# Patient Record
Sex: MALE | Employment: FULL TIME | URBAN - METROPOLITAN AREA
[De-identification: names, ages, dates, MRNs, and addresses within clinical notes are randomized per-mention and may not be internally consistent; named-entity substitution may affect disease eponyms.]

---

## 2019-09-18 ENCOUNTER — APPOINTMENT (OUTPATIENT)
Dept: GENERAL RADIOLOGY | Age: 39
DRG: 907 | End: 2019-09-18
Attending: EMERGENCY MEDICINE
Payer: COMMERCIAL

## 2019-09-18 ENCOUNTER — HOSPITAL ENCOUNTER (EMERGENCY)
Age: 39
Discharge: HOME | DRG: 907 | End: 2019-09-18
Attending: EMERGENCY MEDICINE
Payer: COMMERCIAL

## 2019-09-18 ENCOUNTER — APPOINTMENT (OUTPATIENT)
Dept: ULTRASOUND IMAGING | Age: 39
DRG: 907 | End: 2019-09-18
Attending: EMERGENCY MEDICINE
Payer: COMMERCIAL

## 2019-09-18 VITALS
BODY MASS INDEX: 30.53 KG/M2 | TEMPERATURE: 98.8 F | DIASTOLIC BLOOD PRESSURE: 77 MMHG | SYSTOLIC BLOOD PRESSURE: 135 MMHG | WEIGHT: 190 LBS | OXYGEN SATURATION: 97 % | HEIGHT: 66 IN | RESPIRATION RATE: 14 BRPM | HEART RATE: 81 BPM

## 2019-09-18 DIAGNOSIS — R10.13 ABDOMINAL PAIN, EPIGASTRIC: Primary | ICD-10-CM

## 2019-09-18 LAB
ALBUMIN SERPL-MCNC: 4 G/DL (ref 3.5–5)
ALBUMIN/GLOB SERPL: 1.1 {RATIO} (ref 1.1–2.2)
ALP SERPL-CCNC: 80 U/L (ref 45–117)
ALT SERPL-CCNC: 103 U/L (ref 12–78)
ANION GAP SERPL CALC-SCNC: 5 MMOL/L (ref 5–15)
AST SERPL-CCNC: 41 U/L (ref 15–37)
BASOPHILS # BLD: 0 K/UL (ref 0–0.1)
BASOPHILS NFR BLD: 0 % (ref 0–1)
BILIRUB SERPL-MCNC: 0.4 MG/DL (ref 0.2–1)
BUN SERPL-MCNC: 17 MG/DL (ref 6–20)
BUN/CREAT SERPL: 17 (ref 12–20)
CALCIUM SERPL-MCNC: 9.1 MG/DL (ref 8.5–10.1)
CHLORIDE SERPL-SCNC: 106 MMOL/L (ref 97–108)
CO2 SERPL-SCNC: 27 MMOL/L (ref 21–32)
CREAT SERPL-MCNC: 1.03 MG/DL (ref 0.7–1.3)
DIFFERENTIAL METHOD BLD: ABNORMAL
EOSINOPHIL # BLD: 0 K/UL (ref 0–0.4)
EOSINOPHIL NFR BLD: 0 % (ref 0–7)
ERYTHROCYTE [DISTWIDTH] IN BLOOD BY AUTOMATED COUNT: 12.5 % (ref 11.5–14.5)
GLOBULIN SER CALC-MCNC: 3.8 G/DL (ref 2–4)
GLUCOSE SERPL-MCNC: 139 MG/DL (ref 65–100)
HCT VFR BLD AUTO: 41 % (ref 36.6–50.3)
HGB BLD-MCNC: 14.3 G/DL (ref 12.1–17)
IMM GRANULOCYTES # BLD AUTO: 0.1 K/UL (ref 0–0.04)
IMM GRANULOCYTES NFR BLD AUTO: 1 % (ref 0–0.5)
LYMPHOCYTES # BLD: 1.2 K/UL (ref 0.8–3.5)
LYMPHOCYTES NFR BLD: 11 % (ref 12–49)
MCH RBC QN AUTO: 32.2 PG (ref 26–34)
MCHC RBC AUTO-ENTMCNC: 34.9 G/DL (ref 30–36.5)
MCV RBC AUTO: 92.3 FL (ref 80–99)
MONOCYTES # BLD: 0.6 K/UL (ref 0–1)
MONOCYTES NFR BLD: 5 % (ref 5–13)
NEUTS SEG # BLD: 9.1 K/UL (ref 1.8–8)
NEUTS SEG NFR BLD: 83 % (ref 32–75)
NRBC # BLD: 0 K/UL (ref 0–0.01)
NRBC BLD-RTO: 0 PER 100 WBC
PLATELET # BLD AUTO: 227 K/UL (ref 150–400)
PMV BLD AUTO: 9.3 FL (ref 8.9–12.9)
POTASSIUM SERPL-SCNC: 3.6 MMOL/L (ref 3.5–5.1)
PROT SERPL-MCNC: 7.8 G/DL (ref 6.4–8.2)
RBC # BLD AUTO: 4.44 M/UL (ref 4.1–5.7)
SODIUM SERPL-SCNC: 138 MMOL/L (ref 136–145)
WBC # BLD AUTO: 10.9 K/UL (ref 4.1–11.1)

## 2019-09-18 PROCEDURE — 74011250636 HC RX REV CODE- 250/636

## 2019-09-18 PROCEDURE — 36415 COLL VENOUS BLD VENIPUNCTURE: CPT

## 2019-09-18 PROCEDURE — 71045 X-RAY EXAM CHEST 1 VIEW: CPT

## 2019-09-18 PROCEDURE — 76705 ECHO EXAM OF ABDOMEN: CPT

## 2019-09-18 PROCEDURE — 74011250637 HC RX REV CODE- 250/637: Performed by: EMERGENCY MEDICINE

## 2019-09-18 PROCEDURE — 85025 COMPLETE CBC W/AUTO DIFF WBC: CPT

## 2019-09-18 PROCEDURE — 74011250636 HC RX REV CODE- 250/636: Performed by: EMERGENCY MEDICINE

## 2019-09-18 PROCEDURE — 99283 EMERGENCY DEPT VISIT LOW MDM: CPT

## 2019-09-18 PROCEDURE — 80053 COMPREHEN METABOLIC PANEL: CPT

## 2019-09-18 RX ORDER — OXYCODONE AND ACETAMINOPHEN 5; 325 MG/1; MG/1
1 TABLET ORAL
Qty: 9 TAB | Refills: 0 | Status: SHIPPED | OUTPATIENT
Start: 2019-09-18 | End: 2019-09-26

## 2019-09-18 RX ORDER — ONDANSETRON 4 MG/1
8 TABLET, ORALLY DISINTEGRATING ORAL
Status: COMPLETED | OUTPATIENT
Start: 2019-09-18 | End: 2019-09-18

## 2019-09-18 RX ORDER — LOSARTAN POTASSIUM 50 MG/1
50 TABLET ORAL DAILY
COMMUNITY
End: 2019-09-26

## 2019-09-18 RX ORDER — ONDANSETRON 8 MG/1
8 TABLET, ORALLY DISINTEGRATING ORAL
Qty: 15 TAB | Refills: 0 | Status: SHIPPED | OUTPATIENT
Start: 2019-09-18 | End: 2019-09-26

## 2019-09-18 RX ORDER — ACETAMINOPHEN 500 MG
500 TABLET ORAL
COMMUNITY

## 2019-09-18 RX ORDER — OXYCODONE AND ACETAMINOPHEN 5; 325 MG/1; MG/1
1 TABLET ORAL ONCE
Status: COMPLETED | OUTPATIENT
Start: 2019-09-18 | End: 2019-09-18

## 2019-09-18 RX ADMIN — ONDANSETRON 8 MG: 4 TABLET, ORALLY DISINTEGRATING ORAL at 21:51

## 2019-09-18 RX ADMIN — OXYCODONE HYDROCHLORIDE AND ACETAMINOPHEN 1 TABLET: 5; 325 TABLET ORAL at 21:51

## 2019-09-18 RX ADMIN — SODIUM CHLORIDE 1000 ML: 900 INJECTION, SOLUTION INTRAVENOUS at 21:50

## 2019-09-18 NOTE — LETTER
[address] 
[facility] EMERGENCY DEPT 
[address] [facility] [place] [address] [phone] Work/School Note Date: 9/18/2019 To Whom It May concern: Fermin Vivas was seen and treated today in the emergency room by the following provider(s): 
Attending Provider: Amy Romero MD. Fermin Vivas may return to work on 9/20/19. Sincerely, Kelsy Pickens MD

## 2019-09-19 NOTE — ED PROVIDER NOTES
Pt presents with 8/10 pain after a liver biopsy by Dr. Brittaney Joseph this morning. Denies fevers, chest pain, shortness of breath. Reports nausea, chills, and lightheadedness - had a small amount of vomiting as well. Symptoms are worse when he stands and improved with rest.  Pain has been gradually worsening. The history is provided by the patient. No past medical history on file. No past surgical history on file. No family history on file. Social History     Socioeconomic History    Marital status: Not on file     Spouse name: Not on file    Number of children: Not on file    Years of education: Not on file    Highest education level: Not on file   Occupational History    Not on file   Social Needs    Financial resource strain: Not on file    Food insecurity:     Worry: Not on file     Inability: Not on file    Transportation needs:     Medical: Not on file     Non-medical: Not on file   Tobacco Use    Smoking status: Not on file   Substance and Sexual Activity    Alcohol use: Not on file    Drug use: Not on file    Sexual activity: Not on file   Lifestyle    Physical activity:     Days per week: Not on file     Minutes per session: Not on file    Stress: Not on file   Relationships    Social connections:     Talks on phone: Not on file     Gets together: Not on file     Attends Sabianist service: Not on file     Active member of club or organization: Not on file     Attends meetings of clubs or organizations: Not on file     Relationship status: Not on file    Intimate partner violence:     Fear of current or ex partner: Not on file     Emotionally abused: Not on file     Physically abused: Not on file     Forced sexual activity: Not on file   Other Topics Concern    Not on file   Social History Narrative    Not on file         ALLERGIES: Patient has no allergy information on record. Review of Systems   Constitutional: Positive for chills. Negative for fever.    Respiratory: Negative for shortness of breath. Cardiovascular: Negative for chest pain. Gastrointestinal: Positive for abdominal pain, nausea and vomiting. Neurological: Positive for dizziness. Vitals:    09/18/19 2044   BP: 135/77   Pulse: 81   Resp: 14   Temp: 98.8 °F (37.1 °C)   SpO2: 97%   Weight: 86.2 kg (190 lb)   Height: 5' 6\" (1.676 m)            Physical Exam   Constitutional: He appears well-developed and well-nourished. No distress. HENT:   Head: Normocephalic and atraumatic. Eyes: Pupils are equal, round, and reactive to light. Conjunctivae are normal.   Neck: Normal range of motion. Cardiovascular: Normal rate and regular rhythm. Pulmonary/Chest: Effort normal and breath sounds normal.   Abdominal: Soft. He exhibits no distension. There is tenderness in the epigastric area. There is no rigidity, no rebound and no guarding. Musculoskeletal: Normal range of motion. Neurological: He is alert. Skin: Skin is dry. Capillary refill takes less than 2 seconds. Nursing note and vitals reviewed. MDM  Number of Diagnoses or Management Options  Abdominal pain, epigastric:   Diagnosis management comments: The patient is resting comfortably and feels better, is alert and in no distress. The repeat examination is unremarkable and benign; in particular, there is no discomfort at McBurney's point. The history, exam, diagnostic testing, and current condition do not suggest acute appendicitis, bowel obstruction, incarcerated hernia, acute cholecystitis, bowel perforation, major gastrointestinal bleeding, severe diverticulitis, sepsis, or other significant pathology to warrant further testing, continued ED treatment, admission, or surgical evaluation at this point. The vital signs have been stable and are within normal limits at this time. The patient does not have uncontrollable pain, intractable vomiting, or other significant symptoms. The patient's condition is stable and appropriate for discharge. The patient will pursue further outpatient evaluation with the primary care physician or other designated or consulting physician as indicated in the discharge instructions.            Procedures

## 2019-09-19 NOTE — ED TRIAGE NOTES
Pt here for increasing pain 8/10 after liver biopsy this a.m Tylenol at 1500. Reports nausea, vomiting, and dizziness.   Pt advised by surgeon to come to ED for eval.

## 2019-09-19 NOTE — PROGRESS NOTES
[facility]: MED RECONCILIATION    Information obtained from: patient, no Rx query on file    Significant PMH/Disease States: No past medical history on file. Chief Complaint for this Admission:   Chief Complaint   Patient presents with    Post OP Complication     Allergies: Patient has no known allergies. Prior to Admission Medications:     Medication Documentation Review Audit       Reviewed by LILI PenalozaD (Pharmacist) on 09/18/19 at 2239      Medication Sig Documenting Provider Last Dose Status Taking?   acetaminophen (TYLENOL) 500 mg tablet Take 500 mg by mouth every six (6) hours as needed for Pain. Provider, Historical 9/18/2019 1500 Active Yes   losartan (COZAAR) 50 mg tablet Take 50 mg by mouth daily. Provider, Historical 9/18/2019 afternoon Active Yes                  Thank you for the consult,  Daryl Sher

## 2019-09-19 NOTE — DISCHARGE INSTRUCTIONS

## 2019-09-19 NOTE — ED NOTES
Patient given discharge instructions per provider, two RX given. Patient verbalized understanding. Ambulatory from ED to home with wife.

## 2019-09-20 ENCOUNTER — APPOINTMENT (OUTPATIENT)
Dept: CT IMAGING | Age: 39
DRG: 907 | End: 2019-09-20
Attending: PHYSICIAN ASSISTANT
Payer: COMMERCIAL

## 2019-09-20 ENCOUNTER — HOSPITAL ENCOUNTER (INPATIENT)
Age: 39
LOS: 6 days | Discharge: HOME | DRG: 907 | End: 2019-09-26
Attending: EMERGENCY MEDICINE | Admitting: SURGERY
Payer: COMMERCIAL

## 2019-09-20 ENCOUNTER — APPOINTMENT (OUTPATIENT)
Dept: INTERVENTIONAL RADIOLOGY/VASCULAR | Age: 39
DRG: 907 | End: 2019-09-20
Attending: PHYSICIAN ASSISTANT
Payer: COMMERCIAL

## 2019-09-20 DIAGNOSIS — K76.89 PERIHEPATIC HEMATOMA: ICD-10-CM

## 2019-09-20 DIAGNOSIS — R00.0 TACHYCARDIA: ICD-10-CM

## 2019-09-20 DIAGNOSIS — R79.89 ELEVATED LFTS: ICD-10-CM

## 2019-09-20 DIAGNOSIS — K66.1 HEMOPERITONEUM: Primary | ICD-10-CM

## 2019-09-20 PROBLEM — I10 HTN (HYPERTENSION): Status: ACTIVE | Noted: 2019-09-20

## 2019-09-20 PROBLEM — E83.119 HEMOCHROMATOSIS: Status: ACTIVE | Noted: 2019-09-20

## 2019-09-20 PROBLEM — K91.840 HEMORRHAGE FOLLOWING LIVER BIOPSY: Status: ACTIVE | Noted: 2019-09-20

## 2019-09-20 LAB
ALBUMIN SERPL-MCNC: 3.6 G/DL (ref 3.5–5)
ALBUMIN/GLOB SERPL: 1 {RATIO} (ref 1.1–2.2)
ALP SERPL-CCNC: 72 U/L (ref 45–117)
ALT SERPL-CCNC: 898 U/L (ref 12–78)
ANION GAP SERPL CALC-SCNC: 6 MMOL/L (ref 5–15)
AST SERPL-CCNC: 560 U/L (ref 15–37)
BASOPHILS # BLD: 0 K/UL (ref 0–0.1)
BASOPHILS NFR BLD: 0 % (ref 0–1)
BILIRUB SERPL-MCNC: 0.7 MG/DL (ref 0.2–1)
BUN SERPL-MCNC: 17 MG/DL (ref 6–20)
BUN/CREAT SERPL: 18 (ref 12–20)
CALCIUM SERPL-MCNC: 8.5 MG/DL (ref 8.5–10.1)
CHLORIDE SERPL-SCNC: 103 MMOL/L (ref 97–108)
CO2 SERPL-SCNC: 28 MMOL/L (ref 21–32)
COMMENT, HOLDF: NORMAL
CREAT SERPL-MCNC: 0.94 MG/DL (ref 0.7–1.3)
DIFFERENTIAL METHOD BLD: ABNORMAL
EOSINOPHIL # BLD: 0 K/UL (ref 0–0.4)
EOSINOPHIL NFR BLD: 0 % (ref 0–7)
ERYTHROCYTE [DISTWIDTH] IN BLOOD BY AUTOMATED COUNT: 12.6 % (ref 11.5–14.5)
GLOBULIN SER CALC-MCNC: 3.5 G/DL (ref 2–4)
GLUCOSE SERPL-MCNC: 131 MG/DL (ref 65–100)
HCT VFR BLD AUTO: 21.8 % (ref 36.6–50.3)
HCT VFR BLD AUTO: 28.5 % (ref 36.6–50.3)
HGB BLD-MCNC: 7.3 G/DL (ref 12.1–17)
HGB BLD-MCNC: 9.8 G/DL (ref 12.1–17)
IMM GRANULOCYTES # BLD AUTO: 0.1 K/UL (ref 0–0.04)
IMM GRANULOCYTES NFR BLD AUTO: 1 % (ref 0–0.5)
INR PPP: 1 (ref 0.9–1.1)
LACTATE BLD-SCNC: 1.54 MMOL/L (ref 0.4–2)
LACTATE BLD-SCNC: 2.02 MMOL/L (ref 0.4–2)
LIPASE SERPL-CCNC: 58 U/L (ref 73–393)
LYMPHOCYTES # BLD: 1.3 K/UL (ref 0.8–3.5)
LYMPHOCYTES NFR BLD: 14 % (ref 12–49)
MCH RBC QN AUTO: 32.2 PG (ref 26–34)
MCHC RBC AUTO-ENTMCNC: 34.4 G/DL (ref 30–36.5)
MCV RBC AUTO: 93.8 FL (ref 80–99)
MONOCYTES # BLD: 0.7 K/UL (ref 0–1)
MONOCYTES NFR BLD: 8 % (ref 5–13)
NEUTS SEG # BLD: 7.3 K/UL (ref 1.8–8)
NEUTS SEG NFR BLD: 78 % (ref 32–75)
NRBC # BLD: 0 K/UL (ref 0–0.01)
NRBC BLD-RTO: 0 PER 100 WBC
PLATELET # BLD AUTO: 179 K/UL (ref 150–400)
PMV BLD AUTO: 9.6 FL (ref 8.9–12.9)
POTASSIUM SERPL-SCNC: 3.7 MMOL/L (ref 3.5–5.1)
PROT SERPL-MCNC: 7.1 G/DL (ref 6.4–8.2)
PROTHROMBIN TIME: 10.3 SEC (ref 9–11.1)
RBC # BLD AUTO: 3.04 M/UL (ref 4.1–5.7)
SAMPLES BEING HELD,HOLD: NORMAL
SODIUM SERPL-SCNC: 137 MMOL/L (ref 136–145)
WBC # BLD AUTO: 9.4 K/UL (ref 4.1–11.1)

## 2019-09-20 PROCEDURE — 83605 ASSAY OF LACTIC ACID: CPT

## 2019-09-20 PROCEDURE — 77030019698 HC SYR ANGI MDLON MRTM -A

## 2019-09-20 PROCEDURE — 36415 COLL VENOUS BLD VENIPUNCTURE: CPT

## 2019-09-20 PROCEDURE — 77030029065 HC DRSG HEMO QCLOT ZMED -B

## 2019-09-20 PROCEDURE — 37244 VASC EMBOLIZE/OCCLUDE BLEED: CPT

## 2019-09-20 PROCEDURE — 04L33DZ OCCLUSION OF HEPATIC ARTERY WITH INTRALUMINAL DEVICE, PERCUTANEOUS APPROACH: ICD-10-PCS | Performed by: RADIOLOGY

## 2019-09-20 PROCEDURE — 74011636320 HC RX REV CODE- 636/320: Performed by: RADIOLOGY

## 2019-09-20 PROCEDURE — 86900 BLOOD TYPING SEROLOGIC ABO: CPT

## 2019-09-20 PROCEDURE — 83690 ASSAY OF LIPASE: CPT

## 2019-09-20 PROCEDURE — 96374 THER/PROPH/DIAG INJ IV PUSH: CPT

## 2019-09-20 PROCEDURE — 65610000006 HC RM INTENSIVE CARE

## 2019-09-20 PROCEDURE — 74177 CT ABD & PELVIS W/CONTRAST: CPT

## 2019-09-20 PROCEDURE — 80053 COMPREHEN METABOLIC PANEL: CPT

## 2019-09-20 PROCEDURE — 85025 COMPLETE CBC W/AUTO DIFF WBC: CPT

## 2019-09-20 PROCEDURE — C1769 GUIDE WIRE: HCPCS

## 2019-09-20 PROCEDURE — 77030004530 HC CATH ANGI DX IMGR BSC -A

## 2019-09-20 PROCEDURE — C1760 CLOSURE DEV, VASC: HCPCS

## 2019-09-20 PROCEDURE — 85610 PROTHROMBIN TIME: CPT

## 2019-09-20 PROCEDURE — 74011250636 HC RX REV CODE- 250/636

## 2019-09-20 PROCEDURE — 99285 EMERGENCY DEPT VISIT HI MDM: CPT

## 2019-09-20 PROCEDURE — 74011250636 HC RX REV CODE- 250/636: Performed by: PHYSICIAN ASSISTANT

## 2019-09-20 PROCEDURE — 74011250636 HC RX REV CODE- 250/636: Performed by: RADIOLOGY

## 2019-09-20 PROCEDURE — 74011000272 HC RX REV CODE- 272: Performed by: RADIOLOGY

## 2019-09-20 PROCEDURE — C1894 INTRO/SHEATH, NON-LASER: HCPCS

## 2019-09-20 PROCEDURE — 86923 COMPATIBILITY TEST ELECTRIC: CPT

## 2019-09-20 PROCEDURE — C1887 CATHETER, GUIDING: HCPCS

## 2019-09-20 PROCEDURE — 99152 MOD SED SAME PHYS/QHP 5/>YRS: CPT

## 2019-09-20 PROCEDURE — 99153 MOD SED SAME PHYS/QHP EA: CPT

## 2019-09-20 PROCEDURE — 85018 HEMOGLOBIN: CPT

## 2019-09-20 PROCEDURE — 74011250636 HC RX REV CODE- 250/636: Performed by: EMERGENCY MEDICINE

## 2019-09-20 PROCEDURE — 74011000250 HC RX REV CODE- 250: Performed by: RADIOLOGY

## 2019-09-20 PROCEDURE — 76937 US GUIDE VASCULAR ACCESS: CPT

## 2019-09-20 PROCEDURE — 96375 TX/PRO/DX INJ NEW DRUG ADDON: CPT

## 2019-09-20 PROCEDURE — 74011250637 HC RX REV CODE- 250/637: Performed by: PHYSICIAN ASSISTANT

## 2019-09-20 RX ORDER — ACETAMINOPHEN 325 MG/1
650 TABLET ORAL
Status: DISCONTINUED | OUTPATIENT
Start: 2019-09-20 | End: 2019-09-23

## 2019-09-20 RX ORDER — ONDANSETRON 2 MG/ML
4 INJECTION INTRAMUSCULAR; INTRAVENOUS
Status: DISCONTINUED | OUTPATIENT
Start: 2019-09-20 | End: 2019-09-26 | Stop reason: HOSPADM

## 2019-09-20 RX ORDER — CEFAZOLIN SODIUM/WATER 2 G/20 ML
2 SYRINGE (ML) INTRAVENOUS ONCE
Status: DISCONTINUED | OUTPATIENT
Start: 2019-09-20 | End: 2019-09-20

## 2019-09-20 RX ORDER — HYDROCODONE BITARTRATE AND ACETAMINOPHEN 10; 325 MG/1; MG/1
1 TABLET ORAL
Status: DISCONTINUED | OUTPATIENT
Start: 2019-09-20 | End: 2019-09-23

## 2019-09-20 RX ORDER — HYDROMORPHONE HYDROCHLORIDE 1 MG/ML
0.5 INJECTION, SOLUTION INTRAMUSCULAR; INTRAVENOUS; SUBCUTANEOUS ONCE
Status: COMPLETED | OUTPATIENT
Start: 2019-09-20 | End: 2019-09-20

## 2019-09-20 RX ORDER — HYDRALAZINE HYDROCHLORIDE 20 MG/ML
20 INJECTION INTRAMUSCULAR; INTRAVENOUS
Status: DISCONTINUED | OUTPATIENT
Start: 2019-09-20 | End: 2019-09-26 | Stop reason: HOSPADM

## 2019-09-20 RX ORDER — LIDOCAINE HYDROCHLORIDE 10 MG/ML
10-30 INJECTION INFILTRATION; PERINEURAL
Status: DISCONTINUED | OUTPATIENT
Start: 2019-09-20 | End: 2019-09-20 | Stop reason: HOSPADM

## 2019-09-20 RX ORDER — NALOXONE HYDROCHLORIDE 0.4 MG/ML
0.4 INJECTION, SOLUTION INTRAMUSCULAR; INTRAVENOUS; SUBCUTANEOUS AS NEEDED
Status: DISCONTINUED | OUTPATIENT
Start: 2019-09-20 | End: 2019-09-26 | Stop reason: HOSPADM

## 2019-09-20 RX ORDER — KETOROLAC TROMETHAMINE 30 MG/ML
30 INJECTION, SOLUTION INTRAMUSCULAR; INTRAVENOUS
Status: COMPLETED | OUTPATIENT
Start: 2019-09-20 | End: 2019-09-20

## 2019-09-20 RX ORDER — FENTANYL CITRATE 50 UG/ML
25-50 INJECTION, SOLUTION INTRAMUSCULAR; INTRAVENOUS
Status: DISCONTINUED | OUTPATIENT
Start: 2019-09-20 | End: 2019-09-20 | Stop reason: HOSPADM

## 2019-09-20 RX ORDER — MIDAZOLAM HYDROCHLORIDE 1 MG/ML
.5-1 INJECTION, SOLUTION INTRAMUSCULAR; INTRAVENOUS
Status: DISCONTINUED | OUTPATIENT
Start: 2019-09-20 | End: 2019-09-20 | Stop reason: HOSPADM

## 2019-09-20 RX ORDER — SODIUM CHLORIDE 9 MG/ML
125 INJECTION, SOLUTION INTRAVENOUS CONTINUOUS
Status: DISCONTINUED | OUTPATIENT
Start: 2019-09-20 | End: 2019-09-22

## 2019-09-20 RX ORDER — DOCUSATE SODIUM 100 MG/1
100 CAPSULE, LIQUID FILLED ORAL 2 TIMES DAILY
Status: DISCONTINUED | OUTPATIENT
Start: 2019-09-20 | End: 2019-09-22

## 2019-09-20 RX ORDER — HYDROCODONE BITARTRATE AND ACETAMINOPHEN 5; 325 MG/1; MG/1
1 TABLET ORAL
Status: DISCONTINUED | OUTPATIENT
Start: 2019-09-20 | End: 2019-09-23

## 2019-09-20 RX ORDER — SODIUM CHLORIDE 0.9 % (FLUSH) 0.9 %
5-40 SYRINGE (ML) INJECTION EVERY 8 HOURS
Status: DISCONTINUED | OUTPATIENT
Start: 2019-09-20 | End: 2019-09-26 | Stop reason: HOSPADM

## 2019-09-20 RX ORDER — MORPHINE SULFATE 4 MG/ML
4 INJECTION INTRAVENOUS ONCE
Status: COMPLETED | OUTPATIENT
Start: 2019-09-20 | End: 2019-09-20

## 2019-09-20 RX ORDER — DIPHENHYDRAMINE HYDROCHLORIDE 50 MG/ML
12.5 INJECTION, SOLUTION INTRAMUSCULAR; INTRAVENOUS
Status: DISCONTINUED | OUTPATIENT
Start: 2019-09-20 | End: 2019-09-26 | Stop reason: HOSPADM

## 2019-09-20 RX ORDER — CEFAZOLIN SODIUM 1 G/3ML
2 INJECTION, POWDER, FOR SOLUTION INTRAMUSCULAR; INTRAVENOUS ONCE
Status: DISCONTINUED | OUTPATIENT
Start: 2019-09-20 | End: 2019-09-20

## 2019-09-20 RX ORDER — HYDROMORPHONE HYDROCHLORIDE 2 MG/ML
0.5 INJECTION, SOLUTION INTRAMUSCULAR; INTRAVENOUS; SUBCUTANEOUS
Status: DISCONTINUED | OUTPATIENT
Start: 2019-09-20 | End: 2019-09-26 | Stop reason: HOSPADM

## 2019-09-20 RX ORDER — SODIUM CHLORIDE 0.9 % (FLUSH) 0.9 %
5-40 SYRINGE (ML) INJECTION AS NEEDED
Status: DISCONTINUED | OUTPATIENT
Start: 2019-09-20 | End: 2019-09-26 | Stop reason: HOSPADM

## 2019-09-20 RX ADMIN — IOPAMIDOL 100 ML: 755 INJECTION, SOLUTION INTRAVENOUS at 12:33

## 2019-09-20 RX ADMIN — LIDOCAINE HYDROCHLORIDE 15 ML: 10 INJECTION, SOLUTION INFILTRATION; PERINEURAL at 16:15

## 2019-09-20 RX ADMIN — FENTANYL CITRATE 25 MCG: 50 INJECTION, SOLUTION INTRAMUSCULAR; INTRAVENOUS at 17:12

## 2019-09-20 RX ADMIN — IOPAMIDOL 100 ML: 755 INJECTION, SOLUTION INTRAVENOUS at 17:20

## 2019-09-20 RX ADMIN — HYDROMORPHONE HYDROCHLORIDE 0.5 MG: 2 INJECTION INTRAMUSCULAR; INTRAVENOUS; SUBCUTANEOUS at 22:19

## 2019-09-20 RX ADMIN — MIDAZOLAM HYDROCHLORIDE 1 MG: 1 INJECTION, SOLUTION INTRAMUSCULAR; INTRAVENOUS at 16:13

## 2019-09-20 RX ADMIN — FENTANYL CITRATE 50 MCG: 50 INJECTION, SOLUTION INTRAMUSCULAR; INTRAVENOUS at 16:04

## 2019-09-20 RX ADMIN — FENTANYL CITRATE 25 MCG: 50 INJECTION, SOLUTION INTRAMUSCULAR; INTRAVENOUS at 16:52

## 2019-09-20 RX ADMIN — CEFAZOLIN 2 G: 1 INJECTION, POWDER, FOR SOLUTION INTRAMUSCULAR; INTRAVENOUS at 16:04

## 2019-09-20 RX ADMIN — Medication 10 ML: at 18:06

## 2019-09-20 RX ADMIN — FENTANYL CITRATE 25 MCG: 50 INJECTION, SOLUTION INTRAMUSCULAR; INTRAVENOUS at 17:01

## 2019-09-20 RX ADMIN — HYDROCODONE BITARTRATE AND ACETAMINOPHEN 1 TABLET: 10; 325 TABLET ORAL at 19:53

## 2019-09-20 RX ADMIN — KETOROLAC TROMETHAMINE 30 MG: 30 INJECTION, SOLUTION INTRAMUSCULAR at 17:12

## 2019-09-20 RX ADMIN — SODIUM CHLORIDE 125 ML/HR: 900 INJECTION, SOLUTION INTRAVENOUS at 18:10

## 2019-09-20 RX ADMIN — FENTANYL CITRATE 25 MCG: 50 INJECTION, SOLUTION INTRAMUSCULAR; INTRAVENOUS at 17:03

## 2019-09-20 RX ADMIN — GELATIN ABSORBABLE SPONGE SIZE 100 1 EACH: MISC at 16:49

## 2019-09-20 RX ADMIN — HYDROMORPHONE HYDROCHLORIDE 0.5 MG: 2 INJECTION INTRAMUSCULAR; INTRAVENOUS; SUBCUTANEOUS at 18:06

## 2019-09-20 RX ADMIN — SODIUM CHLORIDE 1000 ML: 900 INJECTION, SOLUTION INTRAVENOUS at 12:09

## 2019-09-20 RX ADMIN — FENTANYL CITRATE 25 MCG: 50 INJECTION, SOLUTION INTRAMUSCULAR; INTRAVENOUS at 17:16

## 2019-09-20 RX ADMIN — MORPHINE SULFATE 4 MG: 4 INJECTION, SOLUTION INTRAMUSCULAR; INTRAVENOUS at 10:56

## 2019-09-20 RX ADMIN — ONDANSETRON 4 MG: 2 INJECTION INTRAMUSCULAR; INTRAVENOUS at 18:06

## 2019-09-20 RX ADMIN — MIDAZOLAM HYDROCHLORIDE 1 MG: 1 INJECTION, SOLUTION INTRAMUSCULAR; INTRAVENOUS at 16:52

## 2019-09-20 RX ADMIN — HYDROMORPHONE HYDROCHLORIDE 0.5 MG: 1 INJECTION, SOLUTION INTRAMUSCULAR; INTRAVENOUS; SUBCUTANEOUS at 15:23

## 2019-09-20 NOTE — ED PROVIDER NOTES
54-year-old patient with history of hypertension presents to the emergency department with abdominal pain and abdominal bloatingsince liver biopsy on Wednesday which was done at [facility]. + N/V since talking percocet Wed night. He has not passed any gas or stool since Tuesday evening. Denies fever or chills. Reports that he was having liver biopsy for liver problem due to high iron. GI- Dr. Madi Christopher. PMH- HTN, hemochromatosis             Past Medical History:   Diagnosis Date    Hypertension        History reviewed. No pertinent surgical history. History reviewed. No pertinent family history.     Social History     Socioeconomic History    Marital status:      Spouse name: Not on file    Number of children: Not on file    Years of education: Not on file    Highest education level: Not on file   Occupational History    Not on file   Social Needs    Financial resource strain: Not on file    Food insecurity:     Worry: Not on file     Inability: Not on file    Transportation needs:     Medical: Not on file     Non-medical: Not on file   Tobacco Use    Smoking status: Never Smoker    Smokeless tobacco: Never Used   Substance and Sexual Activity    Alcohol use: Not Currently     Frequency: Never    Drug use: Not on file    Sexual activity: Not on file   Lifestyle    Physical activity:     Days per week: Not on file     Minutes per session: Not on file    Stress: Not on file   Relationships    Social connections:     Talks on phone: Not on file     Gets together: Not on file     Attends Cheondoism service: Not on file     Active member of club or organization: Not on file     Attends meetings of clubs or organizations: Not on file     Relationship status: Not on file    Intimate partner violence:     Fear of current or ex partner: Not on file     Emotionally abused: Not on file     Physically abused: Not on file     Forced sexual activity: Not on file   Other Topics Concern    Not on file Social History Narrative    Not on file         ALLERGIES: Patient has no known allergies. Review of Systems   Constitutional: Negative for appetite change, chills, fatigue and fever. HENT: Negative for ear pain and sinus pain. Eyes: Negative for pain. Respiratory: Negative for cough. Cardiovascular: Positive for chest pain. With vomiting   Gastrointestinal: Positive for abdominal distention, abdominal pain, constipation, nausea and vomiting. Negative for blood in stool and diarrhea. Genitourinary: Negative for flank pain. Skin: Negative for rash. Vitals:    09/20/19 1002 09/20/19 1130 09/20/19 1200   BP: (!) 132/92 139/90 (!) 144/97   Pulse: (!) 112 85 81   Resp: 18 (!) 0 25   Temp: 98 °F (36.7 °C)     SpO2: 99% 97% 96%   Weight: 86.2 kg (190 lb)     Height: 5' 6\" (1.676 m)              Physical Exam   Constitutional: He is oriented to person, place, and time. He appears well-developed and well-nourished. Appears uncomfortable   HENT:   Head: Normocephalic and atraumatic. Eyes: Conjunctivae and EOM are normal.   Cardiovascular: Normal rate, regular rhythm and normal heart sounds. Pulmonary/Chest: Effort normal and breath sounds normal.   Abdominal: Soft. He exhibits distension. He exhibits no mass. There is tenderness. There is guarding. There is no rebound. No hernia. Musculoskeletal: He exhibits no edema. Neurological: He is alert and oriented to person, place, and time. Skin: Skin is warm and dry. Psychiatric: He has a normal mood and affect. MDM     25-year-old male presents the emergency department with abdominal pain and bloating status post liver biopsy 2 days ago. Will order labs and CT scan. Differential diagnoses include; bowel obstruction, liver laceration, perforated bowel. 12:12 PM  Evaluated patient. Abdominal pain is a little better. Fluids running. Patient still waiting to go to CT. Discussed lab results. 2:12 PM Radiology called. CT scan shows large perihepatic hematoma with possible active extravasation. Discussed with Dr. Néstor Echevarria. Will consult general surgery. 2:25 PM    Talked with Dr. Mian Schneider (Gen Surg) recommends consulting IR for treatment. Discovered the liver bx was done at [address] [place] [address]. Will transfer to [address] [place] [address].       2:40 PM  Dr. Stan Correa talked with [facility] to facilitate transfer. 3:30 PM  Dr. Mian Schneider presented to ED and examined pt. Recommends pt be admitted here. Will admit to his service with IR consult as opposed to transfer for IR consult with the team that performed liver bx originally on Wednesday. Will cancel transfer to [address] [place] [address]. Procedures          LABS COMPLETED AND REVIEWED:  Recent Results (from the past 12 hour(s))   SAMPLES BEING HELD    Collection Time: 09/20/19 10:15 AM   Result Value Ref Range    SAMPLES BEING HELD 1PST,1SST,1LAV,1BL,1BLDCS     COMMENT        Add-on orders for these samples will be processed based on acceptable specimen integrity and analyte stability, which may vary by analyte. METABOLIC PANEL, COMPREHENSIVE    Collection Time: 09/20/19 10:15 AM   Result Value Ref Range    Sodium 137 136 - 145 mmol/L    Potassium 3.7 3.5 - 5.1 mmol/L    Chloride 103 97 - 108 mmol/L    CO2 28 21 - 32 mmol/L    Anion gap 6 5 - 15 mmol/L    Glucose 131 (H) 65 - 100 mg/dL    BUN 17 6 - 20 MG/DL    Creatinine 0.94 0.70 - 1.30 MG/DL    BUN/Creatinine ratio 18 12 - 20      GFR est AA >60 >60 ml/min/1.73m2    GFR est non-AA >60 >60 ml/min/1.73m2    Calcium 8.5 8.5 - 10.1 MG/DL    Bilirubin, total 0.7 0.2 - 1.0 MG/DL    ALT (SGPT) 898 (H) 12 - 78 U/L    AST (SGOT) 560 (H) 15 - 37 U/L    Alk.  phosphatase 72 45 - 117 U/L    Protein, total 7.1 6.4 - 8.2 g/dL    Albumin 3.6 3.5 - 5.0 g/dL    Globulin 3.5 2.0 - 4.0 g/dL    A-G Ratio 1.0 (L) 1.1 - 2.2     CBC WITH AUTOMATED DIFF    Collection Time: 09/20/19 10:15 AM   Result Value Ref Range    WBC 9.4 4.1 - 11.1 K/uL    RBC 3.04 (L) 4.10 - 5.70 M/uL HGB 9.8 (L) 12.1 - 17.0 g/dL    HCT 28.5 (L) 36.6 - 50.3 %    MCV 93.8 80.0 - 99.0 FL    MCH 32.2 26.0 - 34.0 PG    MCHC 34.4 30.0 - 36.5 g/dL    RDW 12.6 11.5 - 14.5 %    PLATELET 465 925 - 001 K/uL    MPV 9.6 8.9 - 12.9 FL    NRBC 0.0 0  WBC    ABSOLUTE NRBC 0.00 0.00 - 0.01 K/uL    NEUTROPHILS 78 (H) 32 - 75 %    LYMPHOCYTES 14 12 - 49 %    MONOCYTES 8 5 - 13 %    EOSINOPHILS 0 0 - 7 %    BASOPHILS 0 0 - 1 %    IMMATURE GRANULOCYTES 1 (H) 0.0 - 0.5 %    ABS. NEUTROPHILS 7.3 1.8 - 8.0 K/UL    ABS. LYMPHOCYTES 1.3 0.8 - 3.5 K/UL    ABS. MONOCYTES 0.7 0.0 - 1.0 K/UL    ABS. EOSINOPHILS 0.0 0.0 - 0.4 K/UL    ABS. BASOPHILS 0.0 0.0 - 0.1 K/UL    ABS. IMM. GRANS. 0.1 (H) 0.00 - 0.04 K/UL    DF AUTOMATED     LIPASE    Collection Time: 09/20/19 10:15 AM   Result Value Ref Range    Lipase 58 (L) 73 - 393 U/L   PROTHROMBIN TIME + INR    Collection Time: 09/20/19 10:15 AM   Result Value Ref Range    INR 1.0 0.9 - 1.1      Prothrombin time 10.3 9.0 - 11.1 sec   POC LACTIC ACID    Collection Time: 09/20/19 10:22 AM   Result Value Ref Range    Lactic Acid (POC) 2.02 (HH) 0.40 - 2.00 mmol/L   POC LACTIC ACID    Collection Time: 09/20/19  1:47 PM   Result Value Ref Range    Lactic Acid (POC) 1.54 0.40 - 2.00 mmol/L       IMAGING COMPLETED AND REVIEWED:  The following have been ordered and reviewed:    Ct Abd Pelv W Cont    Result Date: 9/20/2019  EXAM: CT ABD PELV W CONT INDICATION: Abd pain, fever, post-op, no recent CT status post liver biopsy COMPARISON: None CONTRAST: 100 mL of Isovue-370. TECHNIQUE: Following the uneventful intravenous administration of contrast, thin axial images were obtained through the abdomen and pelvis. Coronal and sagittal reconstructions were generated. Oral contrast was not administered. CT dose reduction was achieved through use of a standardized protocol tailored for this examination and automatic exposure control for dose modulation.  FINDINGS: LUNG BASES: Mild dependent atelectasis INCIDENTALLY IMAGED HEART AND MEDIASTINUM: Unremarkable. LIVER: There is a large perihepatic hematoma. The collection measures approximately 25 cm transverse and 8 cm AP. Multiple foci of possible active contrast extravasation are noted. The liver is displaced inferiorly. GALLBLADDER: Unremarkable. SPLEEN: No mass. PANCREAS: No mass or ductal dilatation. ADRENALS: Unremarkable. KIDNEYS: No mass, calculus, or hydronephrosis. Simple cyst in left kidney. STOMACH: Unremarkable. SMALL BOWEL: No dilatation or wall thickening. COLON: No dilatation or wall thickening. APPENDIX: Unremarkable. PERITONEUM: Hemoperitoneum extending into the pelvis. RETROPERITONEUM: No lymphadenopathy or aortic aneurysm. REPRODUCTIVE ORGANS: Grossly unremarkable URINARY BLADDER: Unremarkable BONES: No destructive bone lesion. ADDITIONAL COMMENTS: N/A     IMPRESSION: Large perihepatic hematoma with possible active extravasation of contrast material. Hemoperitoneum. Findings were discussed with the emergency department on 9/20/2019 at 2:12 PM      MEDICATIONS GIVEN:  Medications   0.9% sodium chloride infusion (has no administration in time range)   ceFAZolin (ANCEF) injection 2 g (has no administration in time range)   fentaNYL citrate (PF) injection 25-50 mcg (has no administration in time range)   lidocaine (XYLOCAINE) 10 mg/mL (1 %) injection 10-30 mL (has no administration in time range)   midazolam (VERSED) injection 0.5-10 mg (has no administration in time range)   iopamidol (ISOVUE-370) 76 % injection 100-200 mL (has no administration in time range)   morphine injection 4 mg (4 mg IntraVENous Given 9/20/19 1056)   sodium chloride 0.9 % bolus infusion 1,000 mL (1,000 mL IntraVENous New Bag 9/20/19 1209)   iopamidol (ISOVUE-370) 76 % injection 100 mL (100 mL IntraVENous Given 9/20/19 1233)   HYDROmorphone (DILAUDID) syringe 0.5 mg (0.5 mg IntraVENous Given 9/20/19 1523)         CLINICAL IMPRESSION:  1. Hemoperitoneum    2.  Elevated LFTs 3. Tachycardia

## 2019-09-20 NOTE — CONSULTS
Surgery Consult    Subjective:      Raghavendra King is a 35 y.o. male w/ history of HTN and hemochromatosis who presented to the ED on date of admission with c/o abdominal pain. He had undergone outpatient liver biopsy on 9/18 and developed abdominal pain following the procedure. He was seen in the ED that evening and had an US which was unremarkable. He continued to have abdominal pain that worsened today. In ED today, labs showed a decrease in hgb from 14 to 9 and CT shows large subcapsular hepatic hematoma with active extravasation. Patient is not on any blood thinners. He last are this morning, he has had some clear liquids today. He denies SOB or light-headedness. He does have CP with deep inspiration. Past Medical History:   Diagnosis Date    Hypertension      History reviewed. No pertinent surgical history. History reviewed. No pertinent family history.   Social History     Socioeconomic History    Marital status:      Spouse name: Not on file    Number of children: Not on file    Years of education: Not on file    Highest education level: Not on file   Tobacco Use    Smoking status: Never Smoker    Smokeless tobacco: Never Used   Substance and Sexual Activity    Alcohol use: Not Currently     Frequency: Never      Current Facility-Administered Medications   Medication Dose Route Frequency    0.9% sodium chloride infusion  150 mL/hr IntraVENous CONTINUOUS    ceFAZolin (ANCEF) injection 2 g  2 g IntraVENous ONCE    fentaNYL citrate (PF) injection 25-50 mcg  25-50 mcg IntraVENous Multiple    lidocaine (XYLOCAINE) 10 mg/mL (1 %) injection 10-30 mL  10-30 mL SubCUTAneous Multiple    midazolam (VERSED) injection 0.5-10 mg  0.5-10 mg IntraVENous Multiple    iopamidol (ISOVUE-370) 76 % injection 100-200 mL  100-200 mL Other RAD ONCE    hydrALAZINE (APRESOLINE) 20 mg/mL injection 20 mg  20 mg IntraVENous Q6H PRN     Current Outpatient Medications   Medication Sig    losartan (COZAAR) 50 mg tablet Take 50 mg by mouth daily.  acetaminophen (TYLENOL) 500 mg tablet Take 500 mg by mouth every six (6) hours as needed for Pain.  oxyCODONE-acetaminophen (PERCOCET) 5-325 mg per tablet Take 1 Tab by mouth every eight (8) hours as needed for Pain for up to 3 days. Max Daily Amount: 3 Tabs.  ondansetron (ZOFRAN ODT) 8 mg disintegrating tablet Take 1 Tab by mouth every eight (8) hours as needed for Nausea for up to 15 doses. No Known Allergies    Review of Systems:REVIEW OF SYSTEMS:     []     Unable to obtain  ROS due to  []    mental status change  []    sedated   []    intubated   []    Total of 12 systems reviewed as follows:    Constitutional: neg for fevers, chills, weight loss, malaise  Eyes: negative for blurry vision  Ears, nose, mouth, throat, and face: negative for sore throat  Respiratory: negative for SOB  Cardiovascular: negative for CP  Gastrointestinal: + abdominal pain negative for nausea, vomiting, diarrhea, constipation, melena, hematochezia  Genitourinary: negative for dysuria  Integument/breast: neg for skin rash  Hematologic/lymphatic: neg for bruising  Musculoskeletal: negative for muscle aches  Neurological: no dizziness or h/a    Objective:        Patient Vitals for the past 8 hrs:   BP Temp Pulse Resp SpO2 Height Weight   19 1200 (!) 144/97 -- 81 25 96 % -- --   19 1130 139/90 -- 85 (!) 0 97 % -- --   19 1002 (!) 132/92 98 °F (36.7 °C) (!) 112 18 99 % 5' 6\" (1.676 m) 86.2 kg (190 lb)       Temp (24hrs), Av °F (36.7 °C), Min:98 °F (36.7 °C), Max:98 °F (36.7 °C)      Physical Exam:  General:  Alert, cooperative, no distress, appears stated age. Eyes:  Conjunctivae/corneas clear. Nose: Nares normal. Septum midline   Mouth/Throat: Lips, mucosa, and tongue normal.    Neck: Supple, symmetrical, trachea midline   Lungs:   Clear to auscultation bilaterally.    Heart:  Tachycardic    Abdomen:   Soft, tender, distended, no rebound, no guarding, normal bowel sounds   Extremities: Extremities normal, atraumatic, no cyanosis or edema. Skin: Skin color, texture, turgor normal. No rashes or lesions   Neuro: Alert, oriented, speech clear     Labs:   Recent Labs     09/20/19  1015   WBC 9.4   HGB 9.8*   HCT 28.5*        Recent Labs     09/20/19  1015      K 3.7      CO2 28   *   BUN 17   CREA 0.94   CA 8.5   ALB 3.6   TBILI 0.7   SGOT 560*   *     Recent Labs     09/20/19  1015   INR 1.0         Assessment and Plan:     Liver hemorrhage following liver biopsy  Acute blood loss anemia    Patient hemodynamically stable. D/w IR and they will proceed with angio embolization. NPO, trend hgb, type and cross for 2 units to be available if needed. Hospitalist consult for medical management.          Signed By: TRACI Diaz     September 20, 2019

## 2019-09-20 NOTE — ED TRIAGE NOTES
Pt states he had a liver biopsy on Wednesday. Pt was seen here for not feeling well that night. Pt here today with bloating, vomiting, and unable to pass any gas or stool.

## 2019-09-20 NOTE — ED NOTES
TRANSFER - OUT REPORT:    Verbal report given to Osiris Thakkar(name) on Dionna Bowden  being transferred to [facility] ER (unit) for routine progression of care  Report consisted of patients Situation, Background, Assessment and   Recommendations(SBAR). Information from the following report(s) SBAR, Kardex, [facility] and Recent Results was reviewed with the receiving nurse. Lines:   Peripheral IV 09/20/19 Left Antecubital (Active)   Site Assessment Clean, dry, & intact 9/20/2019 10:12 AM   Phlebitis Assessment 0 9/20/2019 10:12 AM   Infiltration Assessment 0 9/20/2019 10:12 AM   Dressing Status Clean, dry, & intact 9/20/2019 10:12 AM   Dressing Type Tape;Transparent 9/20/2019 10:12 AM   Hub Color/Line Status Pink;Patent; Flushed 9/20/2019 10:12 AM   Action Taken Blood drawn 9/20/2019 10:12 AM        Opportunity for questions and clarification was provided.       Patient transported with:   Monitor

## 2019-09-20 NOTE — PROGRESS NOTES
TRANSFER - OUT REPORT:    Verbal report given to [facility], RN on [address] being transferred to ICU 2(unit) for routine progression of care       Report consisted of patient's Situation, Background, Assessment and   Recommendations(SBAR). Information from the following report(s) Procedure Summary was reviewed with the receiving nurse. Opportunity for questions and clarification was provided.       Patient transported with:   Registered Nurse  Tech

## 2019-09-20 NOTE — Clinical Note
Pressure held to arterial site per protocol. Dressed with quik clot and tegaderm. Site soft with no hematoma.

## 2019-09-20 NOTE — PROGRESS NOTES
65- Patient transferred to ICU after embolization of vessel in the liver. 1815- Patient was dry heaving, zofran given. Hydromorphone given afterwards for pain. Pt now resting on his side with right leg straight.

## 2019-09-20 NOTE — PROCEDURES
Interventional Radiology  Procedure Note        9/20/2019 5:29 PM    Patient: Luis Armando Mckinnon     Informed consent obtained    Diagnosis: Hepatic hematoma/bleeding post biopsy     Procedure(s): Hepatic arteriogram performed demonstrating no focal pseudoaneurysm. Several scattered areas of hypervascularity and questionable extravasation in the left hepatic lobe. Given the large size of the left hepatic hematoma, gelfoam slurry embolization of the left hepatic artery was performed. He will likely have significant post embolization syndrome. Pain control with Dilaudid IV or PCA and Toradol. Zofran prn for nausea. Continue IVF hydration. Cipro/Flagyl for 4 days recommended as well to reduce the risk of infection.       Specimens removed:  None     Complications: None    Primary Physician: Margaux Savage MD    Recomendations: N/A    Discharge Disposition: Floor     Full dictated report to follow    Signed By: Margaux Savage MD

## 2019-09-20 NOTE — H&P
Interventional and Vascular Radiology History and Physical    Patient: Carlos Mckinnon 35 y.o. male       Chief Complaint: Post OP Complication; Vomiting; and Gas      History of Present Illness: left hepatic hematoma     History:    Past Medical History:   Diagnosis Date    Hypertension      History reviewed. No pertinent family history.   Social History     Socioeconomic History    Marital status:      Spouse name: Not on file    Number of children: Not on file    Years of education: Not on file    Highest education level: Not on file   Occupational History    Not on file   Social Needs    Financial resource strain: Not on file    Food insecurity:     Worry: Not on file     Inability: Not on file    Transportation needs:     Medical: Not on file     Non-medical: Not on file   Tobacco Use    Smoking status: Never Smoker    Smokeless tobacco: Never Used   Substance and Sexual Activity    Alcohol use: Not Currently     Frequency: Never    Drug use: Not on file    Sexual activity: Not on file   Lifestyle    Physical activity:     Days per week: Not on file     Minutes per session: Not on file    Stress: Not on file   Relationships    Social connections:     Talks on phone: Not on file     Gets together: Not on file     Attends Sabianist service: Not on file     Active member of club or organization: Not on file     Attends meetings of clubs or organizations: Not on file     Relationship status: Not on file    Intimate partner violence:     Fear of current or ex partner: Not on file     Emotionally abused: Not on file     Physically abused: Not on file     Forced sexual activity: Not on file   Other Topics Concern    Not on file   Social History Narrative    Not on file       Allergies: No Known Allergies    Current Medications:  Current Facility-Administered Medications   Medication Dose Route Frequency    0.9% sodium chloride infusion  150 mL/hr IntraVENous CONTINUOUS    fentaNYL citrate (PF) injection 25-50 mcg  25-50 mcg IntraVENous Multiple    lidocaine (XYLOCAINE) 10 mg/mL (1 %) injection 10-30 mL  10-30 mL SubCUTAneous Multiple    midazolam (VERSED) injection 0.5-10 mg  0.5-10 mg IntraVENous Multiple    iopamidol (ISOVUE-370) 76 % injection 100-200 mL  100-200 mL Other RAD ONCE    hydrALAZINE (APRESOLINE) 20 mg/mL injection 20 mg  20 mg IntraVENous Q6H PRN     Current Outpatient Medications   Medication Sig    losartan (COZAAR) 50 mg tablet Take 50 mg by mouth daily.  acetaminophen (TYLENOL) 500 mg tablet Take 500 mg by mouth every six (6) hours as needed for Pain.  oxyCODONE-acetaminophen (PERCOCET) 5-325 mg per tablet Take 1 Tab by mouth every eight (8) hours as needed for Pain for up to 3 days. Max Daily Amount: 3 Tabs.  ondansetron (ZOFRAN ODT) 8 mg disintegrating tablet Take 1 Tab by mouth every eight (8) hours as needed for Nausea for up to 15 doses. Physical Exam:  Blood pressure (!) 144/97, pulse 81, temperature 98 °F (36.7 °C), resp. rate 25, height 5' 6\" (1.676 m), weight 86.2 kg (190 lb), SpO2 96 %. LUNG: clear to auscultation bilaterally, HEART: regular rate and rhythm, S1, S2 normal, no murmur, click, rub or gallop      Alerts:    Hospital Problems  Date Reviewed: 9/20/2019          Codes Class Noted POA    * (Principal) Perihepatic hematoma ICD-10-CM: K76.89  ICD-9-CM: 573.8  9/20/2019 Yes        HTN (hypertension) ICD-10-CM: I10  ICD-9-CM: 401.9  9/20/2019 Yes        Hemochromatosis ICD-10-CM: E83.119  ICD-9-CM: 275.03  9/20/2019 Yes              Laboratory:      Recent Labs     09/20/19  1015   HGB 9.8*   HCT 28.5*   WBC 9.4      INR 1.0   BUN 17   CREA 0.94   K 3.7         Plan of Care/Planned Procedure:  Risks, benefits, and alternatives reviewed with patient and he agrees to proceed with the procedure. Conscious sedation will be performed with IV fentanyl and versed.  Plan is for left hepatic lobe embolization       Tucker Bergman MD

## 2019-09-21 ENCOUNTER — APPOINTMENT (OUTPATIENT)
Dept: GENERAL RADIOLOGY | Age: 39
DRG: 907 | End: 2019-09-21
Attending: FAMILY MEDICINE
Payer: COMMERCIAL

## 2019-09-21 LAB
ALBUMIN SERPL-MCNC: 3.2 G/DL (ref 3.5–5)
ALBUMIN/GLOB SERPL: 1 {RATIO} (ref 1.1–2.2)
ALP SERPL-CCNC: 68 U/L (ref 45–117)
ALT SERPL-CCNC: 1450 U/L (ref 12–78)
ANION GAP SERPL CALC-SCNC: 8 MMOL/L (ref 5–15)
AST SERPL-CCNC: 826 U/L (ref 15–37)
BACTERIA SPEC CULT: NORMAL
BACTERIA SPEC CULT: NORMAL
BILIRUB DIRECT SERPL-MCNC: 0.3 MG/DL (ref 0–0.2)
BILIRUB SERPL-MCNC: 0.8 MG/DL (ref 0.2–1)
BUN SERPL-MCNC: 17 MG/DL (ref 6–20)
BUN/CREAT SERPL: 18 (ref 12–20)
CALCIUM SERPL-MCNC: 7.7 MG/DL (ref 8.5–10.1)
CHLORIDE SERPL-SCNC: 106 MMOL/L (ref 97–108)
CO2 SERPL-SCNC: 26 MMOL/L (ref 21–32)
CREAT SERPL-MCNC: 0.92 MG/DL (ref 0.7–1.3)
ERYTHROCYTE [DISTWIDTH] IN BLOOD BY AUTOMATED COUNT: 12.9 % (ref 11.5–14.5)
GLOBULIN SER CALC-MCNC: 3.1 G/DL (ref 2–4)
GLUCOSE SERPL-MCNC: 145 MG/DL (ref 65–100)
HCT VFR BLD AUTO: 21.2 % (ref 36.6–50.3)
HCT VFR BLD AUTO: 21.4 % (ref 36.6–50.3)
HCT VFR BLD AUTO: 22.6 % (ref 36.6–50.3)
HGB BLD-MCNC: 7.2 G/DL (ref 12.1–17)
HGB BLD-MCNC: 7.3 G/DL (ref 12.1–17)
HGB BLD-MCNC: 7.7 G/DL (ref 12.1–17)
MCH RBC QN AUTO: 32.3 PG (ref 26–34)
MCHC RBC AUTO-ENTMCNC: 34.1 G/DL (ref 30–36.5)
MCV RBC AUTO: 94.7 FL (ref 80–99)
NRBC # BLD: 0 K/UL (ref 0–0.01)
NRBC BLD-RTO: 0 PER 100 WBC
PLATELET # BLD AUTO: 144 K/UL (ref 150–400)
PMV BLD AUTO: 9.4 FL (ref 8.9–12.9)
POTASSIUM SERPL-SCNC: 3.8 MMOL/L (ref 3.5–5.1)
PROT SERPL-MCNC: 6.3 G/DL (ref 6.4–8.2)
RBC # BLD AUTO: 2.26 M/UL (ref 4.1–5.7)
SERVICE CMNT-IMP: NORMAL
SODIUM SERPL-SCNC: 140 MMOL/L (ref 136–145)
WBC # BLD AUTO: 11.7 K/UL (ref 4.1–11.1)

## 2019-09-21 PROCEDURE — 74011250637 HC RX REV CODE- 250/637: Performed by: PHYSICIAN ASSISTANT

## 2019-09-21 PROCEDURE — 80048 BASIC METABOLIC PNL TOTAL CA: CPT

## 2019-09-21 PROCEDURE — 85018 HEMOGLOBIN: CPT

## 2019-09-21 PROCEDURE — 85027 COMPLETE CBC AUTOMATED: CPT

## 2019-09-21 PROCEDURE — P9016 RBC LEUKOCYTES REDUCED: HCPCS

## 2019-09-21 PROCEDURE — 80076 HEPATIC FUNCTION PANEL: CPT

## 2019-09-21 PROCEDURE — 74011250636 HC RX REV CODE- 250/636: Performed by: SURGERY

## 2019-09-21 PROCEDURE — 74011250636 HC RX REV CODE- 250/636: Performed by: PHYSICIAN ASSISTANT

## 2019-09-21 PROCEDURE — 36430 TRANSFUSION BLD/BLD COMPNT: CPT

## 2019-09-21 PROCEDURE — 65610000006 HC RM INTENSIVE CARE

## 2019-09-21 PROCEDURE — 74018 RADEX ABDOMEN 1 VIEW: CPT

## 2019-09-21 PROCEDURE — 30233N1 TRANSFUSION OF NONAUTOLOGOUS RED BLOOD CELLS INTO PERIPHERAL VEIN, PERCUTANEOUS APPROACH: ICD-10-PCS | Performed by: SURGERY

## 2019-09-21 PROCEDURE — 74011250637 HC RX REV CODE- 250/637: Performed by: FAMILY MEDICINE

## 2019-09-21 PROCEDURE — 36415 COLL VENOUS BLD VENIPUNCTURE: CPT

## 2019-09-21 RX ORDER — SODIUM CHLORIDE 9 MG/ML
250 INJECTION, SOLUTION INTRAVENOUS AS NEEDED
Status: DISCONTINUED | OUTPATIENT
Start: 2019-09-21 | End: 2019-09-26 | Stop reason: HOSPADM

## 2019-09-21 RX ORDER — FACIAL-BODY WIPES
10 EACH TOPICAL ONCE
Status: COMPLETED | OUTPATIENT
Start: 2019-09-21 | End: 2019-09-21

## 2019-09-21 RX ORDER — LEVOFLOXACIN 5 MG/ML
500 INJECTION, SOLUTION INTRAVENOUS EVERY 24 HOURS
Status: COMPLETED | OUTPATIENT
Start: 2019-09-21 | End: 2019-09-24

## 2019-09-21 RX ORDER — METRONIDAZOLE 500 MG/100ML
500 INJECTION, SOLUTION INTRAVENOUS EVERY 8 HOURS
Status: COMPLETED | OUTPATIENT
Start: 2019-09-21 | End: 2019-09-24

## 2019-09-21 RX ADMIN — HYDROMORPHONE HYDROCHLORIDE 0.5 MG: 2 INJECTION INTRAMUSCULAR; INTRAVENOUS; SUBCUTANEOUS at 12:57

## 2019-09-21 RX ADMIN — HYDROCODONE BITARTRATE AND ACETAMINOPHEN 1 TABLET: 5; 325 TABLET ORAL at 05:20

## 2019-09-21 RX ADMIN — DOCUSATE SODIUM 100 MG: 100 CAPSULE, LIQUID FILLED ORAL at 17:35

## 2019-09-21 RX ADMIN — METRONIDAZOLE 500 MG: 500 INJECTION, SOLUTION INTRAVENOUS at 10:57

## 2019-09-21 RX ADMIN — LEVOFLOXACIN 500 MG: 5 INJECTION, SOLUTION INTRAVENOUS at 10:14

## 2019-09-21 RX ADMIN — Medication 10 ML: at 00:18

## 2019-09-21 RX ADMIN — ONDANSETRON 4 MG: 2 INJECTION INTRAMUSCULAR; INTRAVENOUS at 10:11

## 2019-09-21 RX ADMIN — HYDROMORPHONE HYDROCHLORIDE 0.5 MG: 2 INJECTION INTRAMUSCULAR; INTRAVENOUS; SUBCUTANEOUS at 07:54

## 2019-09-21 RX ADMIN — Medication 10 ML: at 12:57

## 2019-09-21 RX ADMIN — Medication 10 ML: at 05:21

## 2019-09-21 RX ADMIN — SODIUM CHLORIDE 125 ML/HR: 900 INJECTION, SOLUTION INTRAVENOUS at 05:21

## 2019-09-21 RX ADMIN — BISACODYL 10 MG: 10 SUPPOSITORY RECTAL at 12:56

## 2019-09-21 RX ADMIN — DOCUSATE SODIUM 100 MG: 100 CAPSULE, LIQUID FILLED ORAL at 08:51

## 2019-09-21 RX ADMIN — HYDROCODONE BITARTRATE AND ACETAMINOPHEN 1 TABLET: 10; 325 TABLET ORAL at 10:11

## 2019-09-21 RX ADMIN — HYDROMORPHONE HYDROCHLORIDE 0.5 MG: 2 INJECTION INTRAMUSCULAR; INTRAVENOUS; SUBCUTANEOUS at 03:12

## 2019-09-21 RX ADMIN — HYDROCODONE BITARTRATE AND ACETAMINOPHEN 1 TABLET: 10; 325 TABLET ORAL at 00:18

## 2019-09-21 RX ADMIN — HYDROCODONE BITARTRATE AND ACETAMINOPHEN 1 TABLET: 5; 325 TABLET ORAL at 20:12

## 2019-09-21 NOTE — PROGRESS NOTES
Problem: Falls - Risk of  Goal: *Absence of Falls  Description  Document Timmy Coleman Fall Risk and appropriate interventions in the flowsheet. Outcome: Progressing Towards Goal  Note:   Fall Risk Interventions:            Medication Interventions: Bed/chair exit alarm, Evaluate medications/consider consulting pharmacy                   Problem: Patient Education: Go to Patient Education Activity  Goal: Patient/Family Education  Outcome: Progressing Towards Goal     Problem: Pressure Injury - Risk of  Goal: *Prevention of pressure injury  Description  Document Maxi Scale and appropriate interventions in the flowsheet. Outcome: Progressing Towards Goal  Note:   Pressure Injury Interventions:             Activity Interventions: Pressure redistribution bed/mattress(bed type), Increase time out of bed         Nutrition Interventions: Document food/fluid/supplement intake, Discuss nutritional consult with provider, Offer support with meals,snacks and hydration    Friction and Shear Interventions: HOB 30 degrees or less                Problem: Patient Education: Go to Patient Education Activity  Goal: Patient/Family Education  Outcome: Progressing Towards Goal

## 2019-09-21 NOTE — PROGRESS NOTES
[facility]: MED RECONCILIATION    Comments/Recommendations:   Patient alert and oriented at time of interview. Patient confirmed name and date of birth. Preferred pharmacy is [facility] at [address] [place] [address]. Patient said that he did not take any of his medications today. Information obtained from: patient    Prior to Admission Medications   Prescriptions Last Dose Informant Patient Reported? Taking?   acetaminophen (TYLENOL) 500 mg tablet  Self Yes Yes   Sig: Take 500 mg by mouth every six (6) hours as needed for Pain.   losartan (COZAAR) 50 mg tablet 9/19/2019 at Unknown time Self Yes Yes   Sig: Take 50 mg by mouth daily. ondansetron (ZOFRAN ODT) 8 mg disintegrating tablet  Self No Yes   Sig: Take 1 Tab by mouth every eight (8) hours as needed for Nausea for up to 15 doses. oxyCODONE-acetaminophen (PERCOCET) 5-325 mg per tablet  Self No Yes   Sig: Take 1 Tab by mouth every eight (8) hours as needed for Pain for up to 3 days. Max Daily Amount: 3 Tabs.         Otoniel Sung, Pharmacist

## 2019-09-21 NOTE — PROGRESS NOTES
General Surgery Progress Note      S: Underwent embolization of left hepatic artery yesterday. H&H trending down. Patient with tachycardia and lightheadedness when upright. Having abdominal pain. Patient Vitals for the past 24 hrs:   Temp Pulse Resp BP SpO2   09/21/19 0830 97.7 °F (36.5 °C) (!) 133 28 -- 96 %   09/21/19 0715 -- (!) 105 18 133/85 96 %   09/21/19 0702 -- (!) 106 -- -- --   09/21/19 0700 -- (!) 108 17 131/80 95 %   09/21/19 0600 -- (!) 122 25 (!) 148/99 94 %   09/21/19 0500 -- (!) 107 17 (!) 151/93 93 %   09/21/19 0400 99.1 °F (37.3 °C) (!) 110 18 149/87 92 %   09/21/19 0300 -- (!) 118 21 136/63 95 %   09/21/19 0200 -- 92 19 144/76 94 %   09/21/19 0100 -- 95 24 130/66 95 %   09/21/19 0015 98.9 °F (37.2 °C) 96 19 147/80 95 %   09/20/19 2346 -- 99 -- -- --   09/20/19 2300 -- (!) 108 19 133/73 95 %   09/20/19 2200 -- (!) 111 23 (!) 135/99 94 %   09/20/19 2100 -- 88 19 146/76 95 %   09/20/19 2000 99.3 °F (37.4 °C) (!) 104 20 103/51 97 %   09/20/19 1900 -- 93 17 (!) 150/94 95 %   09/20/19 1830 -- 91 16 129/77 95 %   09/20/19 1815 -- 88 17 130/76 93 %   09/20/19 1800 -- 95 (!) 31 (!) 141/96 95 %   09/20/19 1745 -- 80 23 131/73 96 %   09/20/19 1736 98.8 °F (37.1 °C) 81 24 125/89 96 %   09/20/19 1200 -- 81 25 (!) 144/97 96 %   09/20/19 1130 -- 85 (!) 0 139/90 97 %   09/20/19 1002 98 °F (36.7 °C) (!) 112 18 (!) 132/92 99 %         Date 09/20/19 0700 - 09/21/19 0659 09/21/19 0700 - 09/22/19 0659   Shift 3555-3080 9743-4283 24 Hour Total 8077-9025 3877-9794 24 Hour Total   INTAKE   I.V.(mL/kg/hr) 145(0.1) 1479.2(1.4) 1624.2(0.8)        Volume (sodium chloride 0.9 % bolus infusion 1,000 mL) 125  125        Volume (0.9% sodium chloride infusion)  1479.2 1479.2        Volume (ceFAZolin (ANCEF) 2 g/20 mL in sterile water IV syringe) 0  0        Volume (ceFAZolin (ANCEF) 2 g in sterile water (preservative free) 20 mL IV syringe) 20  20      Shift Total(mL/kg) 145(1.7) 1479.2(17.2) 1624. 2(18.8)      OUTPUT Urine(mL/kg/hr)  1000(1) 1000(0.5) 150  150     Urine Voided  1000 1000 150  150   Shift Total(mL/kg)  1000(11.6) 1000(11.6) 150(1.7)  150(1.7)    479.2 624.2 -150  -150   Weight (kg) 86.2 86.2 86.2 86.2 86.2 86.2           Physical Exam:      General: A&Ox3, uncomfortable  Resp:CTAB  CV: Tachycardic regular rhythm  Abdomen: soft, upper abdominal tenderness, moderate distention, no peritonitis  Extremity: warm, no edema    Lab Results   Component Value Date/Time    WBC 11.7 (H) 09/21/2019 01:30 AM    HGB 7.2 (L) 09/21/2019 05:55 AM    HCT 21.2 (L) 09/21/2019 05:55 AM    PLATELET 851 (L) 91/41/2924 01:30 AM    MCV 94.7 09/21/2019 01:30 AM       Lab Results   Component Value Date/Time    Sodium 140 09/21/2019 01:30 AM    Potassium 3.8 09/21/2019 01:30 AM    Chloride 106 09/21/2019 01:30 AM    CO2 26 09/21/2019 01:30 AM    Anion gap 8 09/21/2019 01:30 AM    Glucose 145 (H) 09/21/2019 01:30 AM    BUN 17 09/21/2019 01:30 AM    Creatinine 0.92 09/21/2019 01:30 AM    BUN/Creatinine ratio 18 09/21/2019 01:30 AM    GFR est AA >60 09/21/2019 01:30 AM    GFR est non-AA >60 09/21/2019 01:30 AM    Calcium 7.7 (L) 09/21/2019 01:30 AM        Lab Results   Component Value Date/Time    INR 1.0 09/20/2019 10:15 AM    Prothrombin time 10.3 09/20/2019 10:15 AM           A/P:  Liver hematoma with hemorrhage after percutaneous liver biopsy s/p IR embolization of the left hepatic artery 9/20/19. Norco and Dilaudid PRN pain. No Toradol   NPO with ice chips and sips  Prophylactic Levaquin and Flagyl per IR x 4 days ordered  Acute blood loss anemia. Transfuse 2 units PRBC. Trend H&H q6h  If he has evidence of persistent bleeding will obtain CTA abdomen  SCDs. Hold DVT chemoprophylaxis   Continue to monitor in ICU.      Alfredo Ferguson MD

## 2019-09-21 NOTE — PROGRESS NOTES
Spiritual Care Assessment/Progress Note  [address] [place] [address] Dr      NAME: Fermin Vivas      MRN: [id number]  AGE: 35 y.o. SEX: male  Christian Affiliation: No preference   Language: English     9/21/2019     Total Time (in minutes): 5     Spiritual Assessment begun in [facility] through conversation with:         []Patient        [] Family    [] Friend(s)        Reason for Consult: Initial/Spiritual assessment, patient floor     Spiritual beliefs: (Please include comment if needed)     [] Identifies with a claudia tradition:         [] Supported by a claudia community:            [] Claims no spiritual orientation:           [] Seeking spiritual identity:                [] Adheres to an individual form of spirituality:           [x] Not able to assess:                           Identified resources for coping:      [] Prayer                               [] Music                  [] Guided Imagery     [] Family/friends                 [] Pet visits     [] Devotional reading                         [x] Unknown     [] Other:                                              Interventions offered during this visit: (See comments for more details)                Plan of Care:     [] Support spiritual and/or cultural needs    [] Support AMD and/or advance care planning process      [] Support grieving process   [] Coordinate Rites and/or Rituals    [] Coordination with community clergy   [] No spiritual needs identified at this time   [] Detailed Plan of Care below (See Comments)  [] Make referral to Music Therapy  [] Make referral to Pet Therapy     [] Make referral to Addiction services  [] Make referral to [facility]  [] Make referral to Spiritual Care Partner  [] No future visits requested        [x] Follow up visits as needed     Comments:  visit for initial spiritual assessment. Staff with patient providing care. Will continue to follow up as needed and upon request as able.     Visited by Kaylee Chua MDiv, [facility], [facility] paging service: [phone])

## 2019-09-21 NOTE — PROGRESS NOTES
Bedside shift change report given to JESSIKA Dhaliwal RN (oncoming nurse) by Cl Rucker RN (offgoing nurse). Report included the following information SBAR, Kardex, Procedure Summary, MAR, Accordion, Recent Results, Med Rec Status and Cardiac Rhythm ST.   1953 Patient resting in bed c/o abdomen and back pain 10/10 on pain scale. See [facility]  2219 Patient c/o pain 10/10 on pain scale abdomen and back. 0018 Patient c/o pain 8/10 on pain scale back and abdomen. See [facility]  8036 Patient c/o abdomen and back pain 8/10 on pain scale. See [facility]  4097 Patient c/o pain back and abdomen 4/10 on pain scale. See MAR.   3521 Patient up to the chair with min asst.  0600 patient back to bed. Labs sent to the lab and family is at the bedside. Call bell is within reach. 0720 Bedside shift change report given to Jess Barroso RN (oncoming nurse) by Maryellen Queevdo RN (offgoing nurse).  Report included the following information SBAR, Kardex, Intake/Output, MAR, Accordion, Recent Results, Med Rec Status and Cardiac Rhythm ST.

## 2019-09-21 NOTE — PROGRESS NOTES
Bedside and Verbal shift change report given to [facility] RN (oncoming nurse) by Zachary De La Garza (offgoing nurse). Report included the following information SBAR, Kardex, Recent Results, Med Rec Status, Cardiac Rhythm ST and Alarm Parameters . Primary Nurse Jacqueline Don RN and Valeria Luna RN performed a dual skin assessment on this patient No impairment noted  Maxi score is 20    0730- Patient asked for pain medication. 46- Patient ambulated, with one assist. Pt very short of breath. Pt complaining of abdomen pain. Pt stated that he can't stool or pass gas. Belly is very distended. Bowel sounds present. MD surgery called r/t ST that was not resolved with rest and increased work of breathing. MD ordered 2 units of PRBC's.    0830- 1 unit of PRBC's given. 5945- KUB ordered. 1015- Pain medication given. 1300- Pain medication given. Second unit of blood given. Suppository given. 1539-MD surgery called related to patient having difficulty passing gas. Pt has walked the unit three times today and has not been able to have a bowel movement or pass gas. Today marks the fourth day of not being able to have a BM. MD stated that sometimes \"an ileus can develop and that it would take time to pass gas. \"

## 2019-09-21 NOTE — PROGRESS NOTES
Family Practice Consult and Daily Progress Note: 9/21/2019  Dory Cassette Pratibha Mercado DO    Assessment/Plan:   Liver hemorrhage following liver biopsy, perihepatic hematoma - s/p gelfoam slurry embolization of the left hepatic artery 9/20/19 by IR    Acute blood loss anemia    HTN - hold BP meds for now    Hemochromatosis - monitor       Problem List:  Problem List as of 9/21/2019 Date Reviewed: 9/20/2019          Codes Class Noted - Resolved    * (Principal) Perihepatic hematoma ICD-10-CM: K76.89  ICD-9-CM: 573.8  9/20/2019 - Present        HTN (hypertension) ICD-10-CM: I10  ICD-9-CM: 401.9  9/20/2019 - Present        Hemochromatosis ICD-10-CM: E83.119  ICD-9-CM: 275.03  9/20/2019 - Present        Hemorrhage following liver biopsy ICD-10-CM: K91.840  ICD-9-CM: 998.11  9/20/2019 - Present            Subjective: We are asked to see this 35 y.o. male for his hypertension. He is a pt of Dr Devika Whitman in our offices,  w/ history of HTN and hemochromatosis who presented to the ER on date of admission with c/o abdominal pain. He had undergone outpatient liver biopsy on 9/18 and developed abdominal pain following the procedure, was seen in the ER with negative findings on US 9/18 and released. On the day of admission labs revealed a decrease in hgb from 14 to 9 and CT shows large subcapsular hepatic hematoma with active extravasation. Currently he reports he reports \"just feel bad all over\" with no specific complaint. He is tachy and Hb is 7.2. For now will obviously hold BP meds. Also given his Hb has gone from 14ish to 7ish over 2 days, would consider transfusion of PRC. Will cont to follow. Past Medical History:   Diagnosis Date    Hypertension      Past Surgical History:   Procedure Laterality Date    IR [facility]order[facility]  9/20/2019     History reviewed. No pertinent family history.   Social History     Socioeconomic History    Marital status:      Spouse name: Not on file    Number of children: Not on file    Years of education: Not on file    Highest education level: Not on file   Occupational History    Not on file   Social Needs    Financial resource strain: Not on file    Food insecurity:     Worry: Not on file     Inability: Not on file    Transportation needs:     Medical: Not on file     Non-medical: Not on file   Tobacco Use    Smoking status: Never Smoker    Smokeless tobacco: Never Used   Substance and Sexual Activity    Alcohol use: Not Currently     Frequency: Never    Drug use: Not on file    Sexual activity: Not on file   Lifestyle    Physical activity:     Days per week: Not on file     Minutes per session: Not on file    Stress: Not on file   Relationships    Social connections:     Talks on phone: Not on file     Gets together: Not on file     Attends Zoroastrianism service: Not on file     Active member of club or organization: Not on file     Attends meetings of clubs or organizations: Not on file     Relationship status: Not on file    Intimate partner violence:     Fear of current or ex partner: Not on file     Emotionally abused: Not on file     Physically abused: Not on file     Forced sexual activity: Not on file   Other Topics Concern    Not on file   Social History Narrative    Not on file     No Known Allergies    Review of Systems:   A comprehensive review of systems was negative except for that written in the HPI. Objective:   Physical Exam:     Visit Vitals  /85   Pulse (!) 105   Temp 99.1 °F (37.3 °C)   Resp 18   Ht 5' 6\" (1.676 m)   Wt 86.2 kg (190 lb)   SpO2 96%   BMI 30.67 kg/m²      O2 Device: Room air  Temp (24hrs), Av.8 °F (37.1 °C), Min:98 °F (36.7 °C), Max:99.3 °F (37.4 °C)    701 - 1900  In: -   Out: 150 [Urine:150]   [birth year]01 -  07  In: 1624.2 [I.V.:1624.2]  Out: 1000 [Urine:1000]  General:  Alert, cooperative, no distress, appears stated age.    Head:  Normocephalic, without obvious abnormality, atraumatic. Eyes:  Conjunctivae/corneas clear. PERRL, EOMs intact. Nose: Nares normal. Septum midline. Mucosa normal. No drainage or sinus tenderness. Throat: Lips, mucosa, and tongue moist..   Neck: Supple, symmetrical, trachea midline, no adenopathy, thyroid: no enlargement/tenderness/nodules, no carotid bruit and no JVD. Back:   Symmetric, no curvature. ROM normal. No CVA tenderness. Lungs:   Clear to auscultation bilaterally. Chest wall:  No tenderness or deformity. Heart:  Rapid, regular rate and rhythm, no murmur, click, rub or gallop. Abdomen:   generalized tenderness and mild rebound. Few BS at this moment. Extremities: no cyanosis or edema. No calf tenderness or cords. Pulses: 2+ and symmetric all extremities. Skin: turgor normal. No rashes   Neurologic: CNII-XII intact. Alert and oriented X 3. Fine motor of hands and fingers normal.   equal.  No cogwheeling or rigidity. Gait not tested at this time. Sensation grossly normal to touch. Gross motor of extremities normal.       Data Review:       Recent Days:  Recent Labs     09/21/19  0555 09/21/19  0130 09/20/19  1820 09/20/19  1015 09/18/19 2054   WBC  --  11.7*  --  9.4 10.9   HGB 7.2* 7.3* 7.3* 9.8* 14.3   HCT 21.2* 21.4* 21.8* 28.5* 41.0   PLT  --  144*  --  179 227     Recent Labs     09/21/19  0130 09/20/19  1015 09/18/19 2054    137 138   K 3.8 3.7 3.6    103 106   CO2 26 28 27   * 131* 139*   BUN 17 17 17   CREA 0.92 0.94 1.03   CA 7.7* 8.5 9.1   ALB  --  3.6 4.0   TBILI  --  0.7 0.4   SGOT  --  560* 41*   ALT  --  898* 103*   INR  --  1.0  --      No results for input(s): PH, PCO2, PO2, HCO3, FIO2 in the last 72 hours.     24 Hour Results:  Recent Results (from the past 24 hour(s))   SAMPLES BEING HELD    Collection Time: 09/20/19 10:15 AM   Result Value Ref Range    SAMPLES BEING HELD 1PST,1SST,1LAV,1BL,1BLDCS     COMMENT        Add-on orders for these samples will be processed based on acceptable specimen integrity and analyte stability, which may vary by analyte. METABOLIC PANEL, COMPREHENSIVE    Collection Time: 09/20/19 10:15 AM   Result Value Ref Range    Sodium 137 136 - 145 mmol/L    Potassium 3.7 3.5 - 5.1 mmol/L    Chloride 103 97 - 108 mmol/L    CO2 28 21 - 32 mmol/L    Anion gap 6 5 - 15 mmol/L    Glucose 131 (H) 65 - 100 mg/dL    BUN 17 6 - 20 MG/DL    Creatinine 0.94 0.70 - 1.30 MG/DL    BUN/Creatinine ratio 18 12 - 20      GFR est AA >60 >60 ml/min/1.73m2    GFR est non-AA >60 >60 ml/min/1.73m2    Calcium 8.5 8.5 - 10.1 MG/DL    Bilirubin, total 0.7 0.2 - 1.0 MG/DL    ALT (SGPT) 898 (H) 12 - 78 U/L    AST (SGOT) 560 (H) 15 - 37 U/L    Alk. phosphatase 72 45 - 117 U/L    Protein, total 7.1 6.4 - 8.2 g/dL    Albumin 3.6 3.5 - 5.0 g/dL    Globulin 3.5 2.0 - 4.0 g/dL    A-G Ratio 1.0 (L) 1.1 - 2.2     CBC WITH AUTOMATED DIFF    Collection Time: 09/20/19 10:15 AM   Result Value Ref Range    WBC 9.4 4.1 - 11.1 K/uL    RBC 3.04 (L) 4.10 - 5.70 M/uL    HGB 9.8 (L) 12.1 - 17.0 g/dL    HCT 28.5 (L) 36.6 - 50.3 %    MCV 93.8 80.0 - 99.0 FL    MCH 32.2 26.0 - 34.0 PG    MCHC 34.4 30.0 - 36.5 g/dL    RDW 12.6 11.5 - 14.5 %    PLATELET 263 343 - 880 K/uL    MPV 9.6 8.9 - 12.9 FL    NRBC 0.0 0  WBC    ABSOLUTE NRBC 0.00 0.00 - 0.01 K/uL    NEUTROPHILS 78 (H) 32 - 75 %    LYMPHOCYTES 14 12 - 49 %    MONOCYTES 8 5 - 13 %    EOSINOPHILS 0 0 - 7 %    BASOPHILS 0 0 - 1 %    IMMATURE GRANULOCYTES 1 (H) 0.0 - 0.5 %    ABS. NEUTROPHILS 7.3 1.8 - 8.0 K/UL    ABS. LYMPHOCYTES 1.3 0.8 - 3.5 K/UL    ABS. MONOCYTES 0.7 0.0 - 1.0 K/UL    ABS. EOSINOPHILS 0.0 0.0 - 0.4 K/UL    ABS. BASOPHILS 0.0 0.0 - 0.1 K/UL    ABS. IMM.  GRANS. 0.1 (H) 0.00 - 0.04 K/UL    DF AUTOMATED     LIPASE    Collection Time: 09/20/19 10:15 AM   Result Value Ref Range    Lipase 58 (L) 73 - 393 U/L   PROTHROMBIN TIME + INR    Collection Time: 09/20/19 10:15 AM   Result Value Ref Range    INR 1.0 0.9 - 1.1      Prothrombin time 10.3 9.0 - 11.1 sec   POC LACTIC ACID    Collection Time: 09/20/19 10:22 AM   Result Value Ref Range    Lactic Acid (POC) 2.02 (HH) 0.40 - 2.00 mmol/L   POC LACTIC ACID    Collection Time: 09/20/19  1:47 PM   Result Value Ref Range    Lactic Acid (POC) 1.54 0.40 - 2.00 mmol/L   TYPE + CROSSMATCH    Collection Time: 09/20/19  6:20 PM   Result Value Ref Range    Crossmatch Expiration 09/23/2019     ABO/Rh(D) O POSITIVE     Antibody screen NEG     Unit number [id number]     Blood component type [facility]     Unit division 00     Status of unit ALLOCATED     Crossmatch result Compatible     Unit number [id number]     Blood component type [facility]     Unit division 00     Status of unit ISSUED     Crossmatch result Compatible    HGB & HCT    Collection Time: 09/20/19  6:20 PM   Result Value Ref Range    HGB 7.3 (L) 12.1 - 17.0 g/dL    HCT 21.8 (L) 36.6 - 50.3 %   CBC W/O DIFF    Collection Time: 09/21/19  1:30 AM   Result Value Ref Range    WBC 11.7 (H) 4.1 - 11.1 K/uL    RBC 2.26 (L) 4.10 - 5.70 M/uL    HGB 7.3 (L) 12.1 - 17.0 g/dL    HCT 21.4 (L) 36.6 - 50.3 %    MCV 94.7 80.0 - 99.0 FL    MCH 32.3 26.0 - 34.0 PG    MCHC 34.1 30.0 - 36.5 g/dL    RDW 12.9 11.5 - 14.5 %    PLATELET 237 (L) 151 - 400 K/uL    MPV 9.4 8.9 - 12.9 FL    NRBC 0.0 0  WBC    ABSOLUTE NRBC 0.00 0.00 - 2.79 K/uL   METABOLIC PANEL, BASIC    Collection Time: 09/21/19  1:30 AM   Result Value Ref Range    Sodium 140 136 - 145 mmol/L    Potassium 3.8 3.5 - 5.1 mmol/L    Chloride 106 97 - 108 mmol/L    CO2 26 21 - 32 mmol/L    Anion gap 8 5 - 15 mmol/L    Glucose 145 (H) 65 - 100 mg/dL    BUN 17 6 - 20 MG/DL    Creatinine 0.92 0.70 - 1.30 MG/DL    BUN/Creatinine ratio 18 12 - 20      GFR est AA >60 >60 ml/min/1.73m2    GFR est non-AA >60 >60 ml/min/1.73m2    Calcium 7.7 (L) 8.5 - 10.1 MG/DL   HGB & HCT    Collection Time: 09/21/19  5:55 AM   Result Value Ref Range    HGB 7.2 (L) 12.1 - 17.0 g/dL    HCT 21.2 (L) 36.6 - 50.3 %       Medications reviewed  Current Facility-Administered Medications   Medication Dose Route Frequency    0.9% sodium chloride infusion 250 mL  250 mL IntraVENous PRN    0.9% sodium chloride infusion  125 mL/hr IntraVENous CONTINUOUS    hydrALAZINE (APRESOLINE) 20 mg/mL injection 20 mg  20 mg IntraVENous Q6H PRN    sodium chloride (NS) flush 5-40 mL  5-40 mL IntraVENous Q8H    sodium chloride (NS) flush 5-40 mL  5-40 mL IntraVENous PRN    acetaminophen (TYLENOL) tablet 650 mg  650 mg Oral Q6H PRN    HYDROmorphone (PF) (DILAUDID) injection 0.5 mg  0.5 mg IntraVENous Q4H PRN    naloxone (NARCAN) injection 0.4 mg  0.4 mg IntraVENous PRN    ondansetron (ZOFRAN) injection 4 mg  4 mg IntraVENous Q4H PRN    HYDROcodone-acetaminophen (NORCO) 5-325 mg per tablet 1 Tab  1 Tab Oral Q4H PRN    HYDROcodone-acetaminophen (NORCO)  mg tablet 1 Tab  1 Tab Oral Q4H PRN    diphenhydrAMINE (BENADRYL) injection 12.5 mg  12.5 mg IntraVENous Q6H PRN    docusate sodium (COLACE) capsule 100 mg  100 mg Oral BID     Care Plan discussed with: Patient and Nurse  Total time spent with patient: 30 minutes.   Rufino Kaufman MD

## 2019-09-22 LAB
ALBUMIN SERPL-MCNC: 3 G/DL (ref 3.5–5)
ALBUMIN SERPL-MCNC: 3.1 G/DL (ref 3.5–5)
ALBUMIN/GLOB SERPL: 1 {RATIO} (ref 1.1–2.2)
ALBUMIN/GLOB SERPL: 1 {RATIO} (ref 1.1–2.2)
ALP SERPL-CCNC: 218 U/L (ref 45–117)
ALP SERPL-CCNC: 224 U/L (ref 45–117)
ALT SERPL-CCNC: >3500 U/L (ref 12–78)
ALT SERPL-CCNC: >3500 U/L (ref 12–78)
ANION GAP SERPL CALC-SCNC: 5 MMOL/L (ref 5–15)
APTT PPP: 24.1 SEC (ref 22.1–32)
AST SERPL-CCNC: >2000 U/L (ref 15–37)
AST SERPL-CCNC: >2000 U/L (ref 15–37)
BILIRUB DIRECT SERPL-MCNC: 0.7 MG/DL (ref 0–0.2)
BILIRUB SERPL-MCNC: 1.6 MG/DL (ref 0.2–1)
BILIRUB SERPL-MCNC: 1.6 MG/DL (ref 0.2–1)
BUN SERPL-MCNC: 13 MG/DL (ref 6–20)
BUN/CREAT SERPL: 15 (ref 12–20)
CALCIUM SERPL-MCNC: 7.6 MG/DL (ref 8.5–10.1)
CHLORIDE SERPL-SCNC: 104 MMOL/L (ref 97–108)
CO2 SERPL-SCNC: 29 MMOL/L (ref 21–32)
COMMENT, HOLDF: NORMAL
CREAT SERPL-MCNC: 0.88 MG/DL (ref 0.7–1.3)
ERYTHROCYTE [DISTWIDTH] IN BLOOD BY AUTOMATED COUNT: 15.8 % (ref 11.5–14.5)
FIBRINOGEN PPP-MCNC: 333 MG/DL (ref 200–475)
GLOBULIN SER CALC-MCNC: 3.1 G/DL (ref 2–4)
GLOBULIN SER CALC-MCNC: 3.2 G/DL (ref 2–4)
GLUCOSE SERPL-MCNC: 121 MG/DL (ref 65–100)
HCT VFR BLD AUTO: 20.3 % (ref 36.6–50.3)
HCT VFR BLD AUTO: 21.5 % (ref 36.6–50.3)
HCT VFR BLD AUTO: 23 % (ref 36.6–50.3)
HGB BLD-MCNC: 7 G/DL (ref 12.1–17)
HGB BLD-MCNC: 7.3 G/DL (ref 12.1–17)
HGB BLD-MCNC: 7.8 G/DL (ref 12.1–17)
INR PPP: 1.2 (ref 0.9–1.1)
MAGNESIUM SERPL-MCNC: 2 MG/DL (ref 1.6–2.4)
MCH RBC QN AUTO: 31 PG (ref 26–34)
MCHC RBC AUTO-ENTMCNC: 33.9 G/DL (ref 30–36.5)
MCV RBC AUTO: 91.3 FL (ref 80–99)
NRBC # BLD: 0.04 K/UL (ref 0–0.01)
NRBC BLD-RTO: 0.5 PER 100 WBC
PLATELET # BLD AUTO: 109 K/UL (ref 150–400)
PMV BLD AUTO: 9.5 FL (ref 8.9–12.9)
POTASSIUM SERPL-SCNC: 3.6 MMOL/L (ref 3.5–5.1)
PROT SERPL-MCNC: 6.1 G/DL (ref 6.4–8.2)
PROT SERPL-MCNC: 6.3 G/DL (ref 6.4–8.2)
PROTHROMBIN TIME: 11.8 SEC (ref 9–11.1)
RBC # BLD AUTO: 2.52 M/UL (ref 4.1–5.7)
SAMPLES BEING HELD,HOLD: NORMAL
SODIUM SERPL-SCNC: 138 MMOL/L (ref 136–145)
THERAPEUTIC RANGE,PTTT: ABNORMAL SECS (ref 58–77)
WBC # BLD AUTO: 8.4 K/UL (ref 4.1–11.1)

## 2019-09-22 PROCEDURE — 65610000006 HC RM INTENSIVE CARE

## 2019-09-22 PROCEDURE — 74011250637 HC RX REV CODE- 250/637: Performed by: PHYSICIAN ASSISTANT

## 2019-09-22 PROCEDURE — 36430 TRANSFUSION BLD/BLD COMPNT: CPT

## 2019-09-22 PROCEDURE — 77030019940 HC BLNKT HYPOTHRM STRY -B

## 2019-09-22 PROCEDURE — 85018 HEMOGLOBIN: CPT

## 2019-09-22 PROCEDURE — 74011250637 HC RX REV CODE- 250/637: Performed by: SURGERY

## 2019-09-22 PROCEDURE — P9016 RBC LEUKOCYTES REDUCED: HCPCS

## 2019-09-22 PROCEDURE — 80076 HEPATIC FUNCTION PANEL: CPT

## 2019-09-22 PROCEDURE — P9035 PLATELET PHERES LEUKOREDUCED: HCPCS

## 2019-09-22 PROCEDURE — 80053 COMPREHEN METABOLIC PANEL: CPT

## 2019-09-22 PROCEDURE — 36415 COLL VENOUS BLD VENIPUNCTURE: CPT

## 2019-09-22 PROCEDURE — 74011250636 HC RX REV CODE- 250/636: Performed by: PHYSICIAN ASSISTANT

## 2019-09-22 PROCEDURE — 85610 PROTHROMBIN TIME: CPT

## 2019-09-22 PROCEDURE — 85027 COMPLETE CBC AUTOMATED: CPT

## 2019-09-22 PROCEDURE — 74011250636 HC RX REV CODE- 250/636: Performed by: SURGERY

## 2019-09-22 PROCEDURE — 83735 ASSAY OF MAGNESIUM: CPT

## 2019-09-22 RX ORDER — SODIUM CHLORIDE 9 MG/ML
250 INJECTION, SOLUTION INTRAVENOUS AS NEEDED
Status: DISCONTINUED | OUTPATIENT
Start: 2019-09-22 | End: 2019-09-26 | Stop reason: HOSPADM

## 2019-09-22 RX ORDER — FACIAL-BODY WIPES
10 EACH TOPICAL DAILY PRN
Status: DISCONTINUED | OUTPATIENT
Start: 2019-09-22 | End: 2019-09-26 | Stop reason: HOSPADM

## 2019-09-22 RX ORDER — POLYETHYLENE GLYCOL 3350 17 G/17G
17 POWDER, FOR SOLUTION ORAL ONCE
Status: COMPLETED | OUTPATIENT
Start: 2019-09-22 | End: 2019-09-22

## 2019-09-22 RX ORDER — DEXTROSE, SODIUM CHLORIDE, AND POTASSIUM CHLORIDE 5; .9; .15 G/100ML; G/100ML; G/100ML
75 INJECTION INTRAVENOUS CONTINUOUS
Status: DISCONTINUED | OUTPATIENT
Start: 2019-09-22 | End: 2019-09-24

## 2019-09-22 RX ORDER — AMOXICILLIN 250 MG
1 CAPSULE ORAL DAILY
Status: DISCONTINUED | OUTPATIENT
Start: 2019-09-22 | End: 2019-09-26 | Stop reason: HOSPADM

## 2019-09-22 RX ADMIN — HYDROCODONE BITARTRATE AND ACETAMINOPHEN 1 TABLET: 5; 325 TABLET ORAL at 00:29

## 2019-09-22 RX ADMIN — Medication 10 ML: at 06:01

## 2019-09-22 RX ADMIN — DOCUSATE SODIUM 100 MG: 100 CAPSULE, LIQUID FILLED ORAL at 08:33

## 2019-09-22 RX ADMIN — ACETAMINOPHEN 650 MG: 325 TABLET ORAL at 21:01

## 2019-09-22 RX ADMIN — SODIUM CHLORIDE 125 ML/HR: 900 INJECTION, SOLUTION INTRAVENOUS at 07:35

## 2019-09-22 RX ADMIN — ACETAMINOPHEN 650 MG: 325 TABLET ORAL at 15:01

## 2019-09-22 RX ADMIN — Medication 10 ML: at 00:01

## 2019-09-22 RX ADMIN — LEVOFLOXACIN 500 MG: 5 INJECTION, SOLUTION INTRAVENOUS at 08:34

## 2019-09-22 RX ADMIN — METRONIDAZOLE 500 MG: 500 INJECTION, SOLUTION INTRAVENOUS at 00:00

## 2019-09-22 RX ADMIN — SENNOSIDES,DOCUSATE SODIUM 1 TABLET: 8.6; 5 TABLET, FILM COATED ORAL at 11:22

## 2019-09-22 RX ADMIN — METRONIDAZOLE 500 MG: 500 INJECTION, SOLUTION INTRAVENOUS at 23:56

## 2019-09-22 RX ADMIN — POLYETHYLENE GLYCOL 3350 17 G: 17 POWDER, FOR SOLUTION ORAL at 02:55

## 2019-09-22 RX ADMIN — DEXTROSE MONOHYDRATE, SODIUM CHLORIDE, AND POTASSIUM CHLORIDE 125 ML/HR: 50; 9; 1.49 INJECTION, SOLUTION INTRAVENOUS at 13:40

## 2019-09-22 RX ADMIN — Medication 10 ML: at 23:51

## 2019-09-22 RX ADMIN — METRONIDAZOLE 500 MG: 500 INJECTION, SOLUTION INTRAVENOUS at 06:01

## 2019-09-22 RX ADMIN — METRONIDAZOLE 500 MG: 500 INJECTION, SOLUTION INTRAVENOUS at 15:39

## 2019-09-22 NOTE — PROGRESS NOTES
General Surgery Progress Note      S: Was transfused 2 units PRBC. Coagulation screen OK. Platelet count trending down. H&H did not augment as expected, is stable. Patient with tachycardia but lightheadedness has resolved. Had some calf burning earlier, not currently.       Patient Vitals for the past 24 hrs:   Temp Pulse Resp BP SpO2   09/22/19 0702 -- (!) 108 -- -- --   09/22/19 0700 99.8 °F (37.7 °C) -- -- -- --   09/22/19 0603 -- (!) 118 19 124/83 95 %   09/22/19 0500 99.3 °F (37.4 °C) (!) 108 22 132/86 97 %   09/22/19 0400 98.8 °F (37.1 °C) (!) 108 20 116/75 98 %   09/22/19 0300 -- (!) 121 28 (!) 133/93 97 %   09/22/19 0200 -- (!) 125 25 114/77 93 %   09/22/19 0112 99.1 °F (37.3 °C) (!) 113 24 116/73 92 %   09/21/19 2220 -- 84 -- -- --   09/21/19 2000 98.9 °F (37.2 °C) (!) 122 (!) 34 139/89 95 %   09/21/19 1900 -- (!) 113 (!) 31 128/68 97 %   09/21/19 1800 -- (!) 111 28 146/85 95 %   09/21/19 1700 -- (!) 111 19 156/89 91 %   09/21/19 1624 -- (!) 126 20 (!) 150/91 93 %   09/21/19 1515 -- (!) 104 17 143/86 96 %   09/21/19 1506 -- (!) 102 -- -- --   09/21/19 1500 -- (!) 104 18 144/90 97 %   09/21/19 1445 -- (!) 107 22 (!) 147/96 98 %   09/21/19 1430 99.3 °F (37.4 °C) (!) 108 20 (!) 140/94 98 %   09/21/19 1415 -- -- -- (!) 136/92 --   09/21/19 1400 -- (!) 106 18 134/85 97 %   09/21/19 1346 -- (!) 124 20 (!) 129/91 97 %   09/21/19 1330 99.1 °F (37.3 °C) (!) 113 17 112/82 98 %   09/21/19 1315 -- (!) 111 16 131/83 97 %   09/21/19 1300 98.9 °F (37.2 °C) (!) 125 23 127/85 95 %   09/21/19 1245 98.9 °F (37.2 °C) (!) 109 19 143/83 93 %   09/21/19 1230 98.8 °F (37.1 °C) (!) 113 20 127/82 94 %   09/21/19 1130 99 °F (37.2 °C) (!) 105 20 131/69 95 %   09/21/19 1115 -- (!) 106 21 139/70 95 %   09/21/19 1100 99.4 °F (37.4 °C) (!) 109 26 129/71 95 %   09/21/19 1045 -- (!) 105 21 137/78 95 %   09/21/19 1030 99 °F (37.2 °C) (!) 101 20 (!) 163/92 94 %   09/21/19 1015 -- (!) 115 20 146/81 95 %         Date 09/21/19 0700 - 09/22/19 0774 09/22/19 0700 - 09/23/19 0659   Shift 0700-1859 1900-0659 24 Hour Total 0159-2675 0504-2512 24 Hour Total   INTAKE   I.V.(mL/kg/hr) 500(0.5) 1225(1.2) 1725(0.8)        Volume (0.9% sodium chloride infusion) 500 1125 1625        Volume (levoFLOXacin (LEVAQUIN) 500 mg in D5W IVPB) 0  0        Volume (metroNIDAZOLE (FLAGYL) IVPB premix 500 mg) 0 100 100      Blood 656.3  656. 3        Volume (TRANSFUSE PACKED RBC'S) 300  300        Volume (TRANSFUSE PACKED RBC'S) 356. 3  356. 3      Shift Total(mL/kg) 1156. 3(13.4) 6168(79.2) 2381. 3(27.6)      OUTPUT   Urine(mL/kg/hr) 925(0.9) 225(0.2) 1150(0.6) 200  200     Urine Voided  200  200   Shift Total(mL/kg) 925(10.7) 225(2.6) 1150(13.3) 200(2.3)  200(2.3)   .3 1000 1231.3 -200  -200   Weight (kg) 86.2 86.2 86.2 86.2 86.2 86.2           Physical Exam:      General: A&Ox3, NAD  Resp:CTAB  CV: Tachycardic regular rhythm  Abdomen: soft, upper abdominal tenderness, moderate distention, no peritonitis  Extremity: warm, no edema    Lab Results   Component Value Date/Time    WBC 8.4 09/22/2019 12:04 AM    HGB 7.3 (L) 09/22/2019 06:00 AM    HCT 21.5 (L) 09/22/2019 06:00 AM    PLATELET 343 (L) 87/56/6104 12:04 AM    MCV 91.3 09/22/2019 12:04 AM       Lab Results   Component Value Date/Time    Sodium 138 09/22/2019 12:04 AM    Potassium 3.6 09/22/2019 12:04 AM    Chloride 104 09/22/2019 12:04 AM    CO2 29 09/22/2019 12:04 AM    Anion gap 5 09/22/2019 12:04 AM    Glucose 121 (H) 09/22/2019 12:04 AM    BUN 13 09/22/2019 12:04 AM    Creatinine 0.88 09/22/2019 12:04 AM    BUN/Creatinine ratio 15 09/22/2019 12:04 AM    GFR est AA >60 09/22/2019 12:04 AM    GFR est non-AA >60 09/22/2019 12:04 AM    Calcium 7.6 (L) 09/22/2019 12:04 AM        Lab Results   Component Value Date/Time    INR 1.2 (H) 09/22/2019 12:04 AM    INR 1.0 09/20/2019 10:15 AM    Prothrombin time 11.8 (H) 09/22/2019 12:04 AM    Prothrombin time 10.3 09/20/2019 10:15 AM           A/P:  Liver hematoma with hemorrhage after percutaneous liver biopsy s/p IR embolization of the left hepatic artery 9/20/19. Norco and Dilaudid PRN pain. No Toradol   Clear liquid diet. Bowel regimen ordered. Prophylactic Levaquin and Flagyl per IR x 4 days ordered  Transaminases elevated after embolization, Continue to trend. Acute blood loss anemia. Was transfused 2 units PRBC. Trend H&H q6h, Coagulation screen ok, Trend coags every other day. Thrombocytopenia, will transfuse 1 unit platelets, and continue to monitor. If he has evidence of persistent bleeding will obtain CTA abdomen  SCDs. Hold DVT chemoprophylaxis   Continue to monitor in ICU.      Akira Muniz MD

## 2019-09-22 NOTE — PROGRESS NOTES
Family Practice Consult and Daily Progress Note: 9/22/2019  Shahnaz Anderson, DO    Assessment/Plan:   Liver hemorrhage following liver biopsy, perihepatic hematoma - s/p gelfoam slurry embolization of the left hepatic artery 9/20/19 by IR    Acute blood loss anemia    HTN - hold BP meds for now    Hemochromatosis - monitor       Problem List:  Problem List as of 9/22/2019 Date Reviewed: 9/20/2019          Codes Class Noted - Resolved    * (Principal) Perihepatic hematoma ICD-10-CM: K76.89  ICD-9-CM: 573.8  9/20/2019 - Present        HTN (hypertension) ICD-10-CM: I10  ICD-9-CM: 401.9  9/20/2019 - Present        Hemochromatosis ICD-10-CM: E83.119  ICD-9-CM: 275.03  9/20/2019 - Present        Hemorrhage following liver biopsy ICD-10-CM: K91.840  ICD-9-CM: 998.11  9/20/2019 - Present            Subjective: We are asked to see this 35 y.o. male for his hypertension. He is a pt of Dr Denisa Ferris in our offices,  w/ history of HTN and hemochromatosis who presented to the ER on date of admission with c/o abdominal pain. He had undergone outpatient liver biopsy on 9/18 and developed abdominal pain following the procedure, was seen in the ER with negative findings on US 9/18 and released. On the day of admission labs revealed a decrease in hgb from 14 to 9 and CT shows large subcapsular hepatic hematoma with active extravasation. Currently he reports he reports \"just feel bad all over\" with no specific complaint. He is tachy and Hb is 7.2. For now will obviously hold BP meds. Also given his Hb has gone from 14ish to 7ish over 2 days, would consider transfusion of PRC. Will cont to follow. 9/22:  Last night had worsening ab pain but this morning feels better with much less ab pain. Passed gas this AM.  Had 1 unit PRC yesterday and Hb was up to 7.8 afterwards. Still tachy - BP holding up ok though. Hb down to 7.3 this AM - may need more blood. LFTs markedly elevated - watch closely.   GI consulted. Past Medical History:   Diagnosis Date    Hypertension      Past Surgical History:   Procedure Laterality Date    IR [facility] W SI  9/20/2019     History reviewed. No pertinent family history. Social History     Socioeconomic History    Marital status:      Spouse name: Not on file    Number of children: Not on file    Years of education: Not on file    Highest education level: Not on file   Occupational History    Not on file   Social Needs    Financial resource strain: Not on file    Food insecurity:     Worry: Not on file     Inability: Not on file    Transportation needs:     Medical: Not on file     Non-medical: Not on file   Tobacco Use    Smoking status: Never Smoker    Smokeless tobacco: Never Used   Substance and Sexual Activity    Alcohol use: Not Currently     Frequency: Never    Drug use: Not on file    Sexual activity: Not on file   Lifestyle    Physical activity:     Days per week: Not on file     Minutes per session: Not on file    Stress: Not on file   Relationships    Social connections:     Talks on phone: Not on file     Gets together: Not on file     Attends Yarsanism service: Not on file     Active member of club or organization: Not on file     Attends meetings of clubs or organizations: Not on file     Relationship status: Not on file    Intimate partner violence:     Fear of current or ex partner: Not on file     Emotionally abused: Not on file     Physically abused: Not on file     Forced sexual activity: Not on file   Other Topics Concern    Not on file   Social History Narrative    Not on file     No Known Allergies    Review of Systems:   A comprehensive review of systems was negative except for that written in the HPI.     Objective:   Physical Exam:     Visit Vitals  /83   Pulse (!) 108   Temp 99.3 °F (37.4 °C)   Resp 19   Ht 5' 6\" (1.676 m)   Wt 86.2 kg (190 lb)   SpO2 95%   BMI 30.67 kg/m²      O2 Device: Room air  Temp (24hrs), Av.9 °F (37.2 °C), Min:97.7 °F (36.5 °C), Max:99.4 °F (37.4 °C)    No intake/output data recorded.  1901 -  0700  In: 3756.3 [I.V.:3100]  Out: 2150 [Urine:2150]  General:  Alert, cooperative, no distress, appears stated age. Head:  Normocephalic, without obvious abnormality, atraumatic. Eyes:  Conjunctivae/corneas clear. PERRL, EOMs intact. Nose: Nares normal. Septum midline. Mucosa normal. No drainage or sinus tenderness. Throat: Lips, mucosa, and tongue moist..   Neck: Supple, symmetrical, trachea midline, no adenopathy, thyroid: no enlargement/tenderness/nodules, no carotid bruit and no JVD. Back:   Symmetric, no curvature. ROM normal. No CVA tenderness. Lungs:   Clear to auscultation bilaterally. Chest wall:  No tenderness or deformity. Heart:  Rapid, regular rate and rhythm, no murmur, click, rub or gallop. Abdomen:   less tenderness this AM without rebound/guarding. BS present    Extremities: no cyanosis or edema. No calf tenderness or cords. Pulses: 2+ and symmetric all extremities. Skin: turgor normal. No rashes   Neurologic: CNII-XII intact. Alert and oriented X 3. Fine motor of hands and fingers normal.   equal.  No cogwheeling or rigidity. Gait not tested at this time. Sensation grossly normal to touch. Gross motor of extremities normal.       Data Review:       Recent Days:  Recent Labs     19  0600 19  0004 19  1735  19  0130  19  1015   WBC  --  8.4  --   --  11.7*  --  9.4   HGB 7.3* 7.8* 7.7*   < > 7.3*   < > 9.8*   HCT 21.5* 23.0* 22.6*   < > 21.4*   < > 28.5*   PLT  --  109*  --   --  144*  --  179    < > = values in this interval not displayed.      Recent Labs     19  0004 19  0130 19  1015    140 137   K 3.6 3.8 3.7    106 103   CO2 29 26 28   * 145* 131*   BUN 13 17 17   CREA 0.88 0.92 0.94   CA 7.6* 7.7* 8.5   MG 2.0  --   --    ALB 3.0* 3.2* 3.6   TBILI 1.6* 0.8 0.7 SGOT >2,000* 826* 560*   ALT >3,500* 1,450* 898*   INR 1.2*  --  1.0     No results for input(s): PH, PCO2, PO2, HCO3, FIO2 in the last 72 hours. 24 Hour Results:  Recent Results (from the past 24 hour(s))   HGB & HCT    Collection Time: 09/21/19  5:35 PM   Result Value Ref Range    HGB 7.7 (L) 12.1 - 17.0 g/dL    HCT 22.6 (L) 36.6 - 50.3 %   COAGULATION SCREEN    Collection Time: 09/22/19 12:04 AM   Result Value Ref Range    INR 1.2 (H) 0.9 - 1.1      Prothrombin time 11.8 (H) 9.0 - 11.1 sec    aPTT 24.1 22.1 - 32.0 sec    aPTT, therapeutic range     58.0 - 77.0 SECS    Fibrinogen 333 200 - 475 mg/dL   CBC W/O DIFF    Collection Time: 09/22/19 12:04 AM   Result Value Ref Range    WBC 8.4 4.1 - 11.1 K/uL    RBC 2.52 (L) 4.10 - 5.70 M/uL    HGB 7.8 (L) 12.1 - 17.0 g/dL    HCT 23.0 (L) 36.6 - 50.3 %    MCV 91.3 80.0 - 99.0 FL    MCH 31.0 26.0 - 34.0 PG    MCHC 33.9 30.0 - 36.5 g/dL    RDW 15.8 (H) 11.5 - 14.5 %    PLATELET 689 (L) 030 - 400 K/uL    MPV 9.5 8.9 - 12.9 FL    NRBC 0.5 (H) 0  WBC    ABSOLUTE NRBC 0.04 (H) 0.00 - 0.01 K/uL   MAGNESIUM    Collection Time: 09/22/19 12:04 AM   Result Value Ref Range    Magnesium 2.0 1.6 - 2.4 mg/dL   METABOLIC PANEL, COMPREHENSIVE    Collection Time: 09/22/19 12:04 AM   Result Value Ref Range    Sodium 138 136 - 145 mmol/L    Potassium 3.6 3.5 - 5.1 mmol/L    Chloride 104 97 - 108 mmol/L    CO2 29 21 - 32 mmol/L    Anion gap 5 5 - 15 mmol/L    Glucose 121 (H) 65 - 100 mg/dL    BUN 13 6 - 20 MG/DL    Creatinine 0.88 0.70 - 1.30 MG/DL    BUN/Creatinine ratio 15 12 - 20      GFR est AA >60 >60 ml/min/1.73m2    GFR est non-AA >60 >60 ml/min/1.73m2    Calcium 7.6 (L) 8.5 - 10.1 MG/DL    Bilirubin, total 1.6 (H) 0.2 - 1.0 MG/DL    ALT (SGPT) >3,500 (H) 12 - 78 U/L    AST (SGOT) >2,000 (H) 15 - 37 U/L    Alk.  phosphatase 224 (H) 45 - 117 U/L    Protein, total 6.1 (L) 6.4 - 8.2 g/dL    Albumin 3.0 (L) 3.5 - 5.0 g/dL    Globulin 3.1 2.0 - 4.0 g/dL    A-G Ratio 1.0 (L) 1.1 - 2.2     SAMPLES BEING HELD    Collection Time: 09/22/19 12:04 AM   Result Value Ref Range    SAMPLES BEING HELD 1LAV     COMMENT        Add-on orders for these samples will be processed based on acceptable specimen integrity and analyte stability, which may vary by analyte. HGB & HCT    Collection Time: 09/22/19  6:00 AM   Result Value Ref Range    HGB 7.3 (L) 12.1 - 17.0 g/dL    HCT 21.5 (L) 36.6 - 50.3 %       Medications reviewed  Current Facility-Administered Medications   Medication Dose Route Frequency    0.9% sodium chloride infusion 250 mL  250 mL IntraVENous PRN    levoFLOXacin (LEVAQUIN) 500 mg in D5W IVPB  500 mg IntraVENous Q24H    metroNIDAZOLE (FLAGYL) IVPB premix 500 mg  500 mg IntraVENous Q8H    0.9% sodium chloride infusion  125 mL/hr IntraVENous CONTINUOUS    hydrALAZINE (APRESOLINE) 20 mg/mL injection 20 mg  20 mg IntraVENous Q6H PRN    sodium chloride (NS) flush 5-40 mL  5-40 mL IntraVENous Q8H    sodium chloride (NS) flush 5-40 mL  5-40 mL IntraVENous PRN    acetaminophen (TYLENOL) tablet 650 mg  650 mg Oral Q6H PRN    HYDROmorphone (PF) (DILAUDID) injection 0.5 mg  0.5 mg IntraVENous Q4H PRN    naloxone (NARCAN) injection 0.4 mg  0.4 mg IntraVENous PRN    ondansetron (ZOFRAN) injection 4 mg  4 mg IntraVENous Q4H PRN    HYDROcodone-acetaminophen (NORCO) 5-325 mg per tablet 1 Tab  1 Tab Oral Q4H PRN    HYDROcodone-acetaminophen (NORCO)  mg tablet 1 Tab  1 Tab Oral Q4H PRN    diphenhydrAMINE (BENADRYL) injection 12.5 mg  12.5 mg IntraVENous Q6H PRN    docusate sodium (COLACE) capsule 100 mg  100 mg Oral BID     Care Plan discussed with: Patient and Nurse  Total time spent with patient: 30 minutes.   Donal Schwarz MD

## 2019-09-22 NOTE — PROGRESS NOTES
BEDSIDE REPORT - ASSUME CARE    0700: Bedside shift change report received by Jordan Ledezma RN. Report given with SBAR, Kardex, Intake/Output and Recent Results. Opportunity for questions and clarification was provided. Assumed care of patient. Safety instructions given and acknowledged by pt. Primary Nurse Analy Fan RN and , RN performed a dual skin assessment on this patient No impairment noted  [id number]: Called gastro consult. Lab will add on hepatic function panel per Para Duty, Lab.  1100: SS enema 1000 cc. Produced gas. Ambulating in hallways. 1215: Labs to dept. Ice packs for fever. 1330: Plts. Given as ordered. Dr. Jaime Crain paged re: lab results and pt. Fever. 1430: Cooling blanket applied. 1500: Orders rec'd for blood transfusion. Advised that tylenol may be given once after liver enzymes results discussed for fever. Found pt. Out of bed.  1653: Dr. Jaime Capron called re: fever of 101.6 with cooling blanket and tylenol. Okay to give PRBC with fever. Pt. Instructed to stay on cooling blanket and to keep BP cuff in place for fever reduction and transfusion vital signs. 1730: Transfusion started. Family instructed to leave pt. In bed, stop applying blankets. BEDSIDE REPORT TO ONCOMING RN    1900: Bedside shift change report given to RN (oncoming nurse) by Jordan Ledezma RN (offgoing nurse). Report given with SBAR, Kardex, Intake/Output and Recent Results. Opportunity for questions and clarification was provided.

## 2019-09-22 NOTE — PROGRESS NOTES
1930 Bedside shift change report given to JESSIKA Larkin RN (oncoming nurse) by Uriel Villanueva RN (offgoing nurse). Report included the following information SBAR, Kardex, Procedure Summary, Intake/Output, MAR, Accordion, Recent Results, Med Rec Status and Cardiac Rhythm ST.      2012 Patient c/o abdomen pain 4/10 on the pain scale. See [facility]  2030 Patient ambulating in the katz. 7156 Patient c/o abdomen pain . See MAR  0200 Patient up ambulating in the katz with family  0500 MD call, Patient c/o abd pain and bili lower extremities feel warm with burring sensation. 0600 Patent up ambulating I the katz. 0715 Bedside shift change report given to JEFFREY Rizzo RN (oncoming nurse) by Nancy Larkin RN (offgoing nurse). Report included the following information SBAR, Kardex, ED Summary, Intake/Output, Accordion, Recent Results and Med Rec Status.

## 2019-09-22 NOTE — CONSULTS
Tresa Moore. Brynn Martin  [phone] office   Gastroenterology Consultation Note      Admit Date: 9/20/2019  Consult Date: 9/22/2019   I greatly appreciate your asking me to see Carlos Caro, thank you very much for the opportunity to participate in his care. Narrative Assessment and Plan   34 y/o  male with significant elevation of transaminases after developing liver hematoma with hemorrhage after percutaneous liver biopsy s/p IR embolization of the left hepatic artery 9/20/19. Elevation of transaminases is expected after the left hepatic artery is embolized. There is no specific treatment for this other than supportive care and time. Would continue to trend daily LFT's. Larger issue is obtaining cessation of bleeding. Would continue to trend H&H and transfuse prn to maintain appropriate HGB, general surgery is following. CTA abdomen planned if evidence of bleeding persists. Will have Dr. Felix Regan or Dr. Navarro Meredith follow-up tomorrow. Subjective:     Chief Complaint: Elevated LFT's    History of Present Illness: Mr. Phi Childs is a 34 y/o HM who was being evaluated by Dr. Ancelmo Hung of [facility] for abnormal LFT's (ALT 79). He underwent a percutaneous liver biopsy Wednesday at [address] [place] [address] and was subsequently discharged to home. He was feeling terrible with abdominal pain, nausea, and therefore presented to [facility] where he was found to have a subcapsular hematoma with active extravasation. IR was consulted and he underwent embolization of the L hepatic artery on 9/20. LFT's now markedly elevated and GI asked to see. Patient complains of abdominal discomfort and distention. His nausea is better and he is tolerating clears. No BM's, urinated x2 today which is dark.       PCP:  Myranda Graff DO    Past Medical History:   Diagnosis Date    Hypertension         Past Surgical History:   Procedure Laterality Date    IR [facility] W SI  9/20/2019       Social History     Tobacco Use  Smoking status: Never Smoker    Smokeless tobacco: Never Used   Substance Use Topics    Alcohol use: Not Currently     Frequency: Never        History reviewed. No pertinent family history. No Known Allergies         Home Medications:  Prior to Admission Medications   Prescriptions Last Dose Informant Patient Reported? Taking?   acetaminophen (TYLENOL) 500 mg tablet  Self Yes Yes   Sig: Take 500 mg by mouth every six (6) hours as needed for Pain.   losartan (COZAAR) 50 mg tablet 9/19/2019 at Unknown time Self Yes Yes   Sig: Take 50 mg by mouth daily. ondansetron (ZOFRAN ODT) 8 mg disintegrating tablet  Self No Yes   Sig: Take 1 Tab by mouth every eight (8) hours as needed for Nausea for up to 15 doses. oxyCODONE-acetaminophen (PERCOCET) 5-325 mg per tablet  Self No Yes   Sig: Take 1 Tab by mouth every eight (8) hours as needed for Pain for up to 3 days. Max Daily Amount: 3 Tabs.       Facility-Administered Medications: None       Hospital Medications:  Current Facility-Administered Medications   Medication Dose Route Frequency    senna-docusate (PERICOLACE) 8.6-50 mg per tablet 1 Tab  1 Tab Oral DAILY    bisacodyl (DULCOLAX) suppository 10 mg  10 mg Rectal DAILY PRN    dextrose 5% - 0.9% NaCl with KCl 20 mEq/L infusion  125 mL/hr IntraVENous CONTINUOUS    0.9% sodium chloride infusion 250 mL  250 mL IntraVENous PRN    0.9% sodium chloride infusion 250 mL  250 mL IntraVENous PRN    levoFLOXacin (LEVAQUIN) 500 mg in D5W IVPB  500 mg IntraVENous Q24H    metroNIDAZOLE (FLAGYL) IVPB premix 500 mg  500 mg IntraVENous Q8H    hydrALAZINE (APRESOLINE) 20 mg/mL injection 20 mg  20 mg IntraVENous Q6H PRN    sodium chloride (NS) flush 5-40 mL  5-40 mL IntraVENous Q8H    sodium chloride (NS) flush 5-40 mL  5-40 mL IntraVENous PRN    acetaminophen (TYLENOL) tablet 650 mg  650 mg Oral Q6H PRN    HYDROmorphone (PF) (DILAUDID) injection 0.5 mg  0.5 mg IntraVENous Q4H PRN    naloxone (NARCAN) injection 0.4 mg  0.4 mg IntraVENous PRN    ondansetron (ZOFRAN) injection 4 mg  4 mg IntraVENous Q4H PRN    HYDROcodone-acetaminophen (NORCO) 5-325 mg per tablet 1 Tab  1 Tab Oral Q4H PRN    HYDROcodone-acetaminophen (NORCO)  mg tablet 1 Tab  1 Tab Oral Q4H PRN    diphenhydrAMINE (BENADRYL) injection 12.5 mg  12.5 mg IntraVENous Q6H PRN       Review of Systems: Admission ROS by Irma Mccabe MD from 9/20/2019 were reviewed with the patient and changes (other than per HPI) include: none      Objective:     Physical Exam:  Visit Vitals  /81   Pulse (!) 119   Temp (!) 101.4 °F (38.6 °C)   Resp 30   Ht 5' 6\" (1.676 m)   Wt 86.2 kg (190 lb)   SpO2 95%   BMI 30.67 kg/m²     SpO2 Readings from Last 6 Encounters:   09/22/19 95%   09/18/19 97%            Intake/Output Summary (Last 24 hours) at 9/22/2019 1426  Last data filed at 9/22/2019 0800  Gross per 24 hour   Intake 1581.3 ml   Output 550 ml   Net 1031.3 ml      General: no distress, comfortable  Cardiovascular: tachy, well perfused, no edema  Respiratory:  No abnormal audible breath sounds, normal respiratory effort, no throacic deformity  GI:  Abdomen distended, non-tender  Neurological:  Motor and sensory function intact in upper extremeties  Psychiatric:  Normal affect, memory intact, appears to have insight into current illness    Laboratory:    Recent Results (from the past 24 hour(s))   HGB & HCT    Collection Time: 09/21/19  5:35 PM   Result Value Ref Range    HGB 7.7 (L) 12.1 - 17.0 g/dL    HCT 22.6 (L) 36.6 - 50.3 %   COAGULATION SCREEN    Collection Time: 09/22/19 12:04 AM   Result Value Ref Range    INR 1.2 (H) 0.9 - 1.1      Prothrombin time 11.8 (H) 9.0 - 11.1 sec    aPTT 24.1 22.1 - 32.0 sec    aPTT, therapeutic range     58.0 - 77.0 SECS    Fibrinogen 333 200 - 475 mg/dL   CBC W/O DIFF    Collection Time: 09/22/19 12:04 AM   Result Value Ref Range    WBC 8.4 4.1 - 11.1 K/uL    RBC 2.52 (L) 4.10 - 5.70 M/uL    HGB 7.8 (L) 12.1 - 17.0 g/dL    HCT 23.0 (L) 36.6 - 50.3 %    MCV 91.3 80.0 - 99.0 FL    MCH 31.0 26.0 - 34.0 PG    MCHC 33.9 30.0 - 36.5 g/dL    RDW 15.8 (H) 11.5 - 14.5 %    PLATELET 608 (L) 493 - 400 K/uL    MPV 9.5 8.9 - 12.9 FL    NRBC 0.5 (H) 0  WBC    ABSOLUTE NRBC 0.04 (H) 0.00 - 0.01 K/uL   MAGNESIUM    Collection Time: 09/22/19 12:04 AM   Result Value Ref Range    Magnesium 2.0 1.6 - 2.4 mg/dL   METABOLIC PANEL, COMPREHENSIVE    Collection Time: 09/22/19 12:04 AM   Result Value Ref Range    Sodium 138 136 - 145 mmol/L    Potassium 3.6 3.5 - 5.1 mmol/L    Chloride 104 97 - 108 mmol/L    CO2 29 21 - 32 mmol/L    Anion gap 5 5 - 15 mmol/L    Glucose 121 (H) 65 - 100 mg/dL    BUN 13 6 - 20 MG/DL    Creatinine 0.88 0.70 - 1.30 MG/DL    BUN/Creatinine ratio 15 12 - 20      GFR est AA >60 >60 ml/min/1.73m2    GFR est non-AA >60 >60 ml/min/1.73m2    Calcium 7.6 (L) 8.5 - 10.1 MG/DL    Bilirubin, total 1.6 (H) 0.2 - 1.0 MG/DL    ALT (SGPT) >3,500 (H) 12 - 78 U/L    AST (SGOT) >2,000 (H) 15 - 37 U/L    Alk. phosphatase 224 (H) 45 - 117 U/L    Protein, total 6.1 (L) 6.4 - 8.2 g/dL    Albumin 3.0 (L) 3.5 - 5.0 g/dL    Globulin 3.1 2.0 - 4.0 g/dL    A-G Ratio 1.0 (L) 1.1 - 2.2     SAMPLES BEING HELD    Collection Time: 09/22/19 12:04 AM   Result Value Ref Range    SAMPLES BEING HELD 1LAV     COMMENT        Add-on orders for these samples will be processed based on acceptable specimen integrity and analyte stability, which may vary by analyte. HEPATIC FUNCTION PANEL    Collection Time: 09/22/19 12:04 AM   Result Value Ref Range    Protein, total 6.3 (L) 6.4 - 8.2 g/dL    Albumin 3.1 (L) 3.5 - 5.0 g/dL    Globulin 3.2 2.0 - 4.0 g/dL    A-G Ratio 1.0 (L) 1.1 - 2.2      Bilirubin, total 1.6 (H) 0.2 - 1.0 MG/DL    Bilirubin, direct 0.7 (H) 0.0 - 0.2 MG/DL    Alk.  phosphatase 218 (H) 45 - 117 U/L    AST (SGOT) >2,000 (H) 15 - 37 U/L    ALT (SGPT) >3,500 (H) 12 - 78 U/L   HGB & HCT    Collection Time: 09/22/19  6:00 AM   Result Value Ref Range    HGB 7.3 (L) 12.1 - 17.0 g/dL    HCT 21.5 (L) 36.6 - 50.3 %   PLATELETS, ALLOCATE    Collection Time: 09/22/19 10:45 AM   Result Value Ref Range    Unit number [id number]     Blood component type PLTPH LR 3     Unit division 00     Status of unit ISSUED    HGB & HCT    Collection Time: 09/22/19 12:18 PM   Result Value Ref Range    HGB 7.0 (L) 12.1 - 17.0 g/dL    HCT 20.3 (L) 36.6 - 50.3 %         Assessment/Plan:     Principal Problem:    Perihepatic hematoma (9/20/2019)    Active Problems:    HTN (hypertension) (9/20/2019)      Hemochromatosis (9/20/2019)      Hemorrhage following liver biopsy (9/20/2019)         See above narrative for full detail.

## 2019-09-22 NOTE — PROGRESS NOTES
1900 Bedside shift change report given to JOAQUINA Manning RN (oncoming nurse) by Wilmar Manning RN (offgoing nurse). Report included the following information SBAR, Kardex, Procedure Summary, Intake/Output, MAR, Accordion, Recent Results, Med Rec Status and Cardiac Rhythm ST. Primary Nurse Antonette Ferrara, AMANDEEP and Dell Ann RN performed a dual skin assessment on this patient No impairment noted  Maxi score is 20  2105 Patient's temp 102 oral after start of 2nd unit of blood. MD Henriquez called and notified. Orders to give tylenol and continue infusion. Cooling blanket also turned on.  0730 Bedside shift change report given to Jessica Escamilla RN (oncoming nurse) by Wilmar Manning RN (offgoing nurse).  Report included the following information SBAR, Kardex, Procedure Summary, MAR, Accordion, Recent Results, Med Rec Status and Cardiac Rhythm NSR,ST.

## 2019-09-23 PROBLEM — K72.00 ACUTE LIVER FAILURE WITHOUT HEPATIC COMA: Status: ACTIVE | Noted: 2019-09-23

## 2019-09-23 LAB
ALBUMIN SERPL-MCNC: 2.3 G/DL (ref 3.5–5)
ALBUMIN/GLOB SERPL: 0.9 {RATIO} (ref 1.1–2.2)
ALP SERPL-CCNC: 200 U/L (ref 45–117)
ALT SERPL-CCNC: 2568 U/L (ref 12–78)
ANION GAP SERPL CALC-SCNC: 5 MMOL/L (ref 5–15)
ANION GAP SERPL CALC-SCNC: 7 MMOL/L (ref 5–15)
APTT PPP: 22.2 SEC (ref 22.1–32)
AST SERPL-CCNC: 1208 U/L (ref 15–37)
BASOPHILS # BLD: 0 K/UL (ref 0–0.1)
BASOPHILS NFR BLD: 0 % (ref 0–1)
BILIRUB SERPL-MCNC: 1.4 MG/DL (ref 0.2–1)
BLD PROD TYP BPU: NORMAL
BPU ID: NORMAL
BUN SERPL-MCNC: 9 MG/DL (ref 6–20)
BUN SERPL-MCNC: 9 MG/DL (ref 6–20)
BUN/CREAT SERPL: 13 (ref 12–20)
BUN/CREAT SERPL: 16 (ref 12–20)
CALCIUM SERPL-MCNC: 6.2 MG/DL (ref 8.5–10.1)
CALCIUM SERPL-MCNC: 7.7 MG/DL (ref 8.5–10.1)
CHLORIDE SERPL-SCNC: 106 MMOL/L (ref 97–108)
CHLORIDE SERPL-SCNC: 111 MMOL/L (ref 97–108)
CO2 SERPL-SCNC: 23 MMOL/L (ref 21–32)
CO2 SERPL-SCNC: 26 MMOL/L (ref 21–32)
CREAT SERPL-MCNC: 0.55 MG/DL (ref 0.7–1.3)
CREAT SERPL-MCNC: 0.67 MG/DL (ref 0.7–1.3)
DIFFERENTIAL METHOD BLD: ABNORMAL
EOSINOPHIL # BLD: 0 K/UL (ref 0–0.4)
EOSINOPHIL NFR BLD: 0 % (ref 0–7)
ERYTHROCYTE [DISTWIDTH] IN BLOOD BY AUTOMATED COUNT: 15 % (ref 11.5–14.5)
FIBRINOGEN PPP-MCNC: 374 MG/DL (ref 200–475)
GLOBULIN SER CALC-MCNC: 2.5 G/DL (ref 2–4)
GLUCOSE SERPL-MCNC: 115 MG/DL (ref 65–100)
GLUCOSE SERPL-MCNC: 98 MG/DL (ref 65–100)
HCT VFR BLD AUTO: 18.7 % (ref 36.6–50.3)
HCT VFR BLD AUTO: 23.6 % (ref 36.6–50.3)
HCT VFR BLD AUTO: 24.2 % (ref 36.6–50.3)
HGB BLD-MCNC: 6.5 G/DL (ref 12.1–17)
HGB BLD-MCNC: 8.2 G/DL (ref 12.1–17)
HGB BLD-MCNC: 8.5 G/DL (ref 12.1–17)
HGB BLD-MCNC: 8.5 G/DL (ref 12.1–17)
IMM GRANULOCYTES # BLD AUTO: 0 K/UL
IMM GRANULOCYTES NFR BLD AUTO: 0 %
INR PPP: 1.2 (ref 0.9–1.1)
LYMPHOCYTES # BLD: 1.8 K/UL (ref 0.8–3.5)
LYMPHOCYTES NFR BLD: 21 % (ref 12–49)
MAGNESIUM SERPL-MCNC: 1.9 MG/DL (ref 1.6–2.4)
MCH RBC QN AUTO: 31.1 PG (ref 26–34)
MCHC RBC AUTO-ENTMCNC: 34.8 G/DL (ref 30–36.5)
MCV RBC AUTO: 89.5 FL (ref 80–99)
MONOCYTES # BLD: 0.8 K/UL (ref 0–1)
MONOCYTES NFR BLD: 9 % (ref 5–13)
NEUTS BAND NFR BLD MANUAL: 1 % (ref 0–6)
NEUTS SEG # BLD: 5.8 K/UL (ref 1.8–8)
NEUTS SEG NFR BLD: 69 % (ref 32–75)
NRBC # BLD: 0.07 K/UL (ref 0–0.01)
NRBC BLD-RTO: 0.8 PER 100 WBC
PHOSPHATE SERPL-MCNC: 0.8 MG/DL (ref 2.6–4.7)
PLATELET # BLD AUTO: 113 K/UL (ref 150–400)
PMV BLD AUTO: 9.3 FL (ref 8.9–12.9)
POTASSIUM SERPL-SCNC: 2.8 MMOL/L (ref 3.5–5.1)
POTASSIUM SERPL-SCNC: 3.6 MMOL/L (ref 3.5–5.1)
PROT SERPL-MCNC: 4.8 G/DL (ref 6.4–8.2)
PROTHROMBIN TIME: 12.4 SEC (ref 9–11.1)
RBC # BLD AUTO: 2.09 M/UL (ref 4.1–5.7)
RBC MORPH BLD: ABNORMAL
SODIUM SERPL-SCNC: 137 MMOL/L (ref 136–145)
SODIUM SERPL-SCNC: 141 MMOL/L (ref 136–145)
STATUS OF UNIT,%ST: NORMAL
THERAPEUTIC RANGE,PTTT: ABNORMAL SECS (ref 58–77)
UNIT DIVISION, %UDIV: 0
WBC # BLD AUTO: 8.4 K/UL (ref 4.1–11.1)

## 2019-09-23 PROCEDURE — 74011250636 HC RX REV CODE- 250/636: Performed by: FAMILY MEDICINE

## 2019-09-23 PROCEDURE — 74011250636 HC RX REV CODE- 250/636: Performed by: INTERNAL MEDICINE

## 2019-09-23 PROCEDURE — 83735 ASSAY OF MAGNESIUM: CPT

## 2019-09-23 PROCEDURE — 36415 COLL VENOUS BLD VENIPUNCTURE: CPT

## 2019-09-23 PROCEDURE — 65610000006 HC RM INTENSIVE CARE

## 2019-09-23 PROCEDURE — 85025 COMPLETE CBC W/AUTO DIFF WBC: CPT

## 2019-09-23 PROCEDURE — 85610 PROTHROMBIN TIME: CPT

## 2019-09-23 PROCEDURE — 85018 HEMOGLOBIN: CPT

## 2019-09-23 PROCEDURE — 74011000258 HC RX REV CODE- 258: Performed by: INTERNAL MEDICINE

## 2019-09-23 PROCEDURE — 74011250636 HC RX REV CODE- 250/636: Performed by: PHYSICIAN ASSISTANT

## 2019-09-23 PROCEDURE — 80053 COMPREHEN METABOLIC PANEL: CPT

## 2019-09-23 PROCEDURE — 84100 ASSAY OF PHOSPHORUS: CPT

## 2019-09-23 PROCEDURE — 74011250637 HC RX REV CODE- 250/637: Performed by: SURGERY

## 2019-09-23 PROCEDURE — 74011250636 HC RX REV CODE- 250/636: Performed by: SURGERY

## 2019-09-23 PROCEDURE — 74011000250 HC RX REV CODE- 250: Performed by: INTERNAL MEDICINE

## 2019-09-23 PROCEDURE — 74011250637 HC RX REV CODE- 250/637: Performed by: INTERNAL MEDICINE

## 2019-09-23 RX ORDER — OXYCODONE HYDROCHLORIDE 5 MG/1
5 TABLET ORAL
Status: DISCONTINUED | OUTPATIENT
Start: 2019-09-23 | End: 2019-09-26 | Stop reason: HOSPADM

## 2019-09-23 RX ORDER — POTASSIUM CHLORIDE 7.45 MG/ML
10 INJECTION INTRAVENOUS
Status: COMPLETED | OUTPATIENT
Start: 2019-09-23 | End: 2019-09-23

## 2019-09-23 RX ORDER — POTASSIUM CHLORIDE 750 MG/1
40 TABLET, FILM COATED, EXTENDED RELEASE ORAL
Status: COMPLETED | OUTPATIENT
Start: 2019-09-23 | End: 2019-09-23

## 2019-09-23 RX ADMIN — CALCIUM GLUCONATE 1 G: 94 INJECTION, SOLUTION INTRAVENOUS at 12:38

## 2019-09-23 RX ADMIN — POTASSIUM CHLORIDE 10 MEQ: 7.46 INJECTION, SOLUTION INTRAVENOUS at 09:22

## 2019-09-23 RX ADMIN — METRONIDAZOLE 500 MG: 500 INJECTION, SOLUTION INTRAVENOUS at 06:28

## 2019-09-23 RX ADMIN — POTASSIUM CHLORIDE 40 MEQ: 750 TABLET, EXTENDED RELEASE ORAL at 12:38

## 2019-09-23 RX ADMIN — METRONIDAZOLE 500 MG: 500 INJECTION, SOLUTION INTRAVENOUS at 22:41

## 2019-09-23 RX ADMIN — POTASSIUM CHLORIDE 10 MEQ: 7.46 INJECTION, SOLUTION INTRAVENOUS at 08:06

## 2019-09-23 RX ADMIN — SENNOSIDES,DOCUSATE SODIUM 1 TABLET: 8.6; 5 TABLET, FILM COATED ORAL at 09:24

## 2019-09-23 RX ADMIN — LEVOFLOXACIN 500 MG: 5 INJECTION, SOLUTION INTRAVENOUS at 09:21

## 2019-09-23 RX ADMIN — Medication 10 ML: at 05:44

## 2019-09-23 RX ADMIN — Medication 10 ML: at 14:53

## 2019-09-23 RX ADMIN — POTASSIUM CHLORIDE 10 MEQ: 7.46 INJECTION, SOLUTION INTRAVENOUS at 06:28

## 2019-09-23 RX ADMIN — SODIUM CHLORIDE: 900 INJECTION, SOLUTION INTRAVENOUS at 17:47

## 2019-09-23 RX ADMIN — METRONIDAZOLE 500 MG: 500 INJECTION, SOLUTION INTRAVENOUS at 16:41

## 2019-09-23 RX ADMIN — Medication 10 ML: at 22:41

## 2019-09-23 RX ADMIN — DEXTROSE MONOHYDRATE, SODIUM CHLORIDE, AND POTASSIUM CHLORIDE 75 ML/HR: 50; 9; 1.49 INJECTION, SOLUTION INTRAVENOUS at 16:10

## 2019-09-23 RX ADMIN — POTASSIUM CHLORIDE 10 MEQ: 7.46 INJECTION, SOLUTION INTRAVENOUS at 10:24

## 2019-09-23 NOTE — PROGRESS NOTES
9/23/2019  1:00 PM  Reason for Admission:   hemorrhage following Liver Biopsy    Pt is a 35 y.o. male w/ history of HTN and hemochromatosis who presented to the ED on date of admission with c/o abdominal pain. He had undergone outpatient liver biopsy on 9/18 and developed abdominal pain following the procedure. PMH significant for HTN                 RRAT Score:       6  Low Risk/Green              Plan for utilizing home health:    No history                      Current Advanced Directive/Advance Care Plan:   Pt does not have ACP, wife Carlos Monique (C) [phone]                          Transition of Care Plan:          1. Hospital admission for medical  management,        2. CM to follow  3. ACP planning, pt declined to complete on this admission      4. D/C when stable to home w/ family assistance and PCP f/u  5  Wife will transport    Care Management Interventions  PCP Verified by CM: Yes(Romaine Trujillo DO, no NN; last visit \"about a month ago\")  Palliative Care Criteria Met (RRAT>21 & CHF Dx)?: No  Mode of Transport at Discharge: Self(wife)  Physical Therapy Consult: No  Occupational Therapy Consult: No  Speech Therapy Consult: No  Current Support Network: Lives with Spouse, Own Home, Family Lives Nearby(pt lives w/ wife and Dtr in pvt residence, reports to be ambulatory, independent ADLs and drives)  Confirm Follow Up Transport: Self  Discharge Location  Discharge Placement: Home with outpatient services    CM met w/ pt and his wife Neo Bose to begin d/c planning, charted demographics verified, pt lives w/ wife and 18 mo Dtr in 2 story home w/  12 entry steps and 13 interior steps to bed/bath.   PTA pt reports to be ambulatory, independent ADLs and drives, wife also drives and is able to assist pt at home  PCP: Melquiades Aguilera Do, no NN  DME;none  Rx: pt has coverage through [employer] fills at [facility] in [address] [place] [address]

## 2019-09-23 NOTE — PROGRESS NOTES
Bedside and Verbal shift change report given to Zhang Caldwell RN (oncoming nurse) by Kylee Macdonald RN (offgoing nurse). Report included the following information SBAR, Kardex, Procedure Summary, Intake/Output, MAR, Accordion, Recent Results, Med Rec Status and Cardiac Rhythm sinus tachy. Primary Nurse Boy Munoz, AMANDEEP and Beatrice Gregory RN performed a dual skin assessment on this patient No impairment noted  Maxi score is 21    8:00 AM - Patient assessed, able to get up and toilet. Dr. Jagruti Knight to bedside. We discussed calcium being bound to albumin and corrected calcium. His calcium does not need to be repleted this morning. 9:00 AM - Hemoglobin rechecked, it is 8.5. Able to eat some breakfast.    10:00 AM - Receiving last run of IV K+, refusing GI lite breakfast.    11:00 AM - Patient ambulating unit with brother after performing satisfactory egress test.    12:00 PM  - Patient assessed, vital signs stable, insulin coverage given. 2:00 PM - Patient ambulating unit with wife. 4:00 PM - Patient assessed, temp of 100.5, hgb 8.2, per Dr. Lyssa Dominguez we can discontinue every 6 hour checks and do daily. Bedside and Verbal shift change report given to Toño Vasquez RN (oncoming nurse) by Zhang Caldwell RN (offgoing nurse). Report included the following information SBAR, Kardex, Procedure Summary, Intake/Output, MAR, Accordion, Recent Results, Med Rec Status and Cardiac Rhythm sinus tachy.

## 2019-09-23 NOTE — PROGRESS NOTES
General Surgery Daily Progress Note    Patient: Neftali Haile MRN: [id number]  SSN: [id number]    YOB: 1980  Age: 35 y.o. Sex: male      Admit Date: 9/20/2019    Subjective:   Pain better, tolerating liquids. + BM. Denies nausea.  Hgb drop again yesterday s/p PRBC x 2  Current Facility-Administered Medications   Medication Dose Route Frequency    potassium chloride 10 mEq in 100 ml IVPB  10 mEq IntraVENous Q1H    senna-docusate (PERICOLACE) 8.6-50 mg per tablet 1 Tab  1 Tab Oral DAILY    bisacodyl (DULCOLAX) suppository 10 mg  10 mg Rectal DAILY PRN    dextrose 5% - 0.9% NaCl with KCl 20 mEq/L infusion  75 mL/hr IntraVENous CONTINUOUS    0.9% sodium chloride infusion 250 mL  250 mL IntraVENous PRN    0.9% sodium chloride infusion 250 mL  250 mL IntraVENous PRN    0.9% sodium chloride infusion 250 mL  250 mL IntraVENous PRN    levoFLOXacin (LEVAQUIN) 500 mg in D5W IVPB  500 mg IntraVENous Q24H    metroNIDAZOLE (FLAGYL) IVPB premix 500 mg  500 mg IntraVENous Q8H    hydrALAZINE (APRESOLINE) 20 mg/mL injection 20 mg  20 mg IntraVENous Q6H PRN    sodium chloride (NS) flush 5-40 mL  5-40 mL IntraVENous Q8H    sodium chloride (NS) flush 5-40 mL  5-40 mL IntraVENous PRN    acetaminophen (TYLENOL) tablet 650 mg  650 mg Oral Q6H PRN    HYDROmorphone (PF) (DILAUDID) injection 0.5 mg  0.5 mg IntraVENous Q4H PRN    naloxone (NARCAN) injection 0.4 mg  0.4 mg IntraVENous PRN    ondansetron (ZOFRAN) injection 4 mg  4 mg IntraVENous Q4H PRN    HYDROcodone-acetaminophen (NORCO) 5-325 mg per tablet 1 Tab  1 Tab Oral Q4H PRN    HYDROcodone-acetaminophen (NORCO)  mg tablet 1 Tab  1 Tab Oral Q4H PRN    diphenhydrAMINE (BENADRYL) injection 12.5 mg  12.5 mg IntraVENous Q6H PRN        Objective:   09/23 0701 - 09/23 1900  In: 750 [I.V.:750]  Out: -   09/21 1901 - 09/23 0700  In: 5347.1 [I.V.:4727.1]  Out: 3554 [Urine:1225]  Patient Vitals for the past 8 hrs:   BP Temp Pulse Resp SpO2   09/23/19 0900 121/83 -- (!) 107 21 100 %   09/23/19 0800 120/85 99.4 °F (37.4 °C) (!) 107 22 95 %   09/23/19 0703 -- -- 99 -- --   09/23/19 0700 98/65 -- 98 21 95 %   09/23/19 0400 111/63 98.2 °F (36.8 °C) (!) 102 23 94 %   09/23/19 0330 123/76 99.3 °F (37.4 °C) 94 20 94 %   09/23/19 0215 (!) 139/95 -- (!) 105 25 98 %   09/23/19 0200 122/77 -- 97 27 97 %       Physical Exam:  General: Alert, cooperative, NAD  Lungs: Unlabored  Heart:  Tachycardic  Abdomen: Soft, mild upper abdominal tenderness, mildly distended. Extremities: Warm, moves all, no edema  Skin:  Warm and dry, no rash    Labs:   Recent Labs     09/23/19  0255 09/23/19  0154   WBC  --  8.4   HGB 8.5* 6.5*   HCT  --  18.7*   PLT  --  113*     Recent Labs     09/23/19  0154      K 2.8*   *   CO2 23   GLU 98   BUN 9   CREA 0.55*   CA 6.2*   MG 1.9   ALB 2.3*   TBILI 1.4*   SGOT 1,208*   ALT 2,568*       Assessment / Plan:   · Left hepatic hematoma following liver biopsy  · S/p left hepatic artery embolization 9/20  · Required transfusion overnight. Appropriate hgb response. Continue serial hgb. If this drops again will get CTA  · Transaminase elevation expected after hepatic embolization. Now trending down. · Pain improved  · Advance diet  · Replete K+. Corrected Ca ok per FP  · SCDs, no DVT chemoprophylaxis given question of ongoing bleeding.

## 2019-09-23 NOTE — PROGRESS NOTES
Danley Cabot, M.D.  [phone]           GI PROGRESS NOTE        NAME: Joel Mckinnon   :  1980   MRN:  [id number]       Subjective:   Abdominal is better controlled now  Got transfused 2 units overnight with appropriate response       Objective:   NAD      VITALS:   Last 24hrs VS reviewed since prior progress note. Most recent are:  Visit Vitals  /70   Pulse (!) 103   Temp 99.4 °F (37.4 °C)   Resp 25   Ht 5' 6\" (1.676 m)   Wt 86.2 kg (190 lb)   SpO2 95%   BMI 30.67 kg/m²       Intake/Output Summary (Last 24 hours) at 2019 1237  Last data filed at 2019 1100  Gross per 24 hour   Intake 5341.26 ml   Output 800 ml   Net 4541.26 ml       PHYSICAL EXAM:  General: Alert, in no acute distress    HEENT: Anicteric sclerae. Lungs:            CTA Bilaterally. Heart:  Regular  rhythm,    Abdomen: Soft, Non distended, RUQ pain -- mild.  (+)Bowel sounds, no HSM  Extremities: No c/c/e  Neurologic:  CN 2-12 gi, Alert and oriented X 3. No acute neurological distress   Psych:   Good insight. Not anxious nor agitated. Lab Data Reviewed:   Recent Labs     1920 19  0255 19  0004   WBC  --   --  8.4  --  8.4   HGB 8.5* 8.5* 6.5*   < > 7.8*   HCT 24.2*  --  18.7*   < > 23.0*   PLT  --   --  113*  --  109*    < > = values in this interval not displayed.      Recent Labs     19  01519  0004    138   K 2.8* 3.6   * 104   CO2 23 29   BUN 9 13   CREA 0.55* 0.88   GLU 98 121*   CA 6.2* 7.6*     Recent Labs     19  01519  0004   SGOT 1,208* >2,000*  >2,000*   * 218*  224*   TP 4.8* 6.3*  6.1*   ALB 2.3* 3.1*  3.0*   GLOB 2.5 3.2  3.1       ________________________________________________________________________  Patient Active Problem List   Diagnosis Code    Perihepatic hematoma K76.89    HTN (hypertension) I10    Hemochromatosis E83.119    Hemorrhage following liver biopsy K91.840    Acute liver failure without hepatic coma K72.00         Assessment and Plan:  · LFT's are improving slowly as anticipated - likely ischemic injury from hepatic artery embolization  · Pain is better - continue supportive care as planned  · Monitor labs closely and transfuse to keep Hgb > 7gms/dl  · He is a heterozygote for H63D mutation and does not have hemochromatosis.      Will continue to follow with you     Signed By: Chandler Mullins MD     9/23/2019  12:36 PM

## 2019-09-23 NOTE — PROGRESS NOTES
1900 - Bedside and Verbal shift change report given to Gaetano Durán (oncoming nurse) by Zhang Caldwell RN (offgoing nurse). Report included the following information SBAR, Kardex, ED Summary, Intake/Output, MAR, Recent Results and Cardiac Rhythm NSR. Primary Nurse Sri Hinojosa and Zhang Caldwell RN performed a dual skin assessment on this patient Impairment noted- see wound doc flow sheet. Old scars. Maxi score is 21.  2000 - Shift assessment completed. See flow sheet. Patient alert and oriented x4, on room air. D5NSw/20K infusing at 75 ml/hr. Patient resting quietly in no acute distress or discomfort at this time. 2206 - Patient walking in the hallway with wife. 2300 - Patient sitting in chair with wife at bedside. 0000 - Reassessment completed. Changes documented on flow sheet. Patient feeling nauseous. PRN Zofran administered. No other complaints at this time. Resting in bed with wife at bedside. 0200 - Patient sleeping with wife at bedside. 0400 - Reassessment completed. No changes to previous assessment. Patient alert and oriented x4, on room air. D5NSw/20K infusing at 75 ml/hr. Patient resting quietly in no acute distress or discomfort at this time. 0515 - AM labs drawn. 0700 - Bedside and Verbal shift change report given to Camilo Walters RN (oncoming nurse) by Gaetano Durán (offgoing nurse). Report included the following information SBAR, Kardex, ED Summary, Procedure Summary, Intake/Output, MAR, Recent Results and Cardiac Rhythm NSR.

## 2019-09-23 NOTE — PROGRESS NOTES
Family Practice Consult and Daily Progress Note: 9/23/2019  Radha Beltran,     Assessment/Plan:   Liver hemorrhage following liver biopsy, perihepatic hematoma - s/p gelfoam slurry embolization of the left hepatic artery 9/20/19 by IR    Acute blood loss anemia - transfuse as needed    HTN - hold BP meds for now    Hemochromatosis - monitor       Problem List:  Problem List as of 9/23/2019 Date Reviewed: 9/20/2019          Codes Class Noted - Resolved    * (Principal) Perihepatic hematoma ICD-10-CM: K76.89  ICD-9-CM: 573.8  9/20/2019 - Present        HTN (hypertension) ICD-10-CM: I10  ICD-9-CM: 401.9  9/20/2019 - Present        Hemochromatosis ICD-10-CM: E83.119  ICD-9-CM: 275.03  9/20/2019 - Present        Hemorrhage following liver biopsy ICD-10-CM: K91.840  ICD-9-CM: 998.11  9/20/2019 - Present            Subjective: We are asked to see this 35 y.o. male for his hypertension. He is a pt of Dr Conor Martinez in our offices,  w/ history of HTN and hemochromatosis who presented to the ER on date of admission with c/o abdominal pain. He had undergone outpatient liver biopsy on 9/18 and developed abdominal pain following the procedure, was seen in the ER with negative findings on US 9/18 and released. On the day of admission labs revealed a decrease in hgb from 14 to 9 and CT shows large subcapsular hepatic hematoma with active extravasation. Currently he reports he reports \"just feel bad all over\" with no specific complaint. He is tachy and Hb is 7.2. For now will obviously hold BP meds. Also given his Hb has gone from 14ish to 7ish over 2 days, would consider transfusion of PRC. Will cont to follow. 9/22:  Last night had worsening ab pain but this morning feels better with much less ab pain. Passed gas this AM.  Had 1 unit PRC yesterday and Hb was up to 7.8 afterwards. Still tachy - BP holding up ok though. Hb down to 7.3 this AM - may need more blood.   LFTs markedly elevated - watch closely. GI consulted. 9/23:  He is feeling better this AM.  Tolerating liquids po. LFTs improving. Hb 8.5 this AM - was 6.5 at 0100 and received 2 units PRCs. K+ low - replacing. Ca+ is ok when corrected for low albumin - no replacement needed but cont to monitor. Tmax last night 102. Tnow 98.2. Watching temp curve. Poss CTA of ab planned. Transfusing as needed. Past Medical History:   Diagnosis Date    Hypertension      Past Surgical History:   Procedure Laterality Date    IR Alexisivan Francia [facility] W SI  9/20/2019     History reviewed. No pertinent family history.   Social History     Socioeconomic History    Marital status:      Spouse name: Not on file    Number of children: Not on file    Years of education: Not on file    Highest education level: Not on file   Occupational History    Not on file   Social Needs    Financial resource strain: Not on file    Food insecurity:     Worry: Not on file     Inability: Not on file    Transportation needs:     Medical: Not on file     Non-medical: Not on file   Tobacco Use    Smoking status: Never Smoker    Smokeless tobacco: Never Used   Substance and Sexual Activity    Alcohol use: Not Currently     Frequency: Never    Drug use: Not on file    Sexual activity: Not on file   Lifestyle    Physical activity:     Days per week: Not on file     Minutes per session: Not on file    Stress: Not on file   Relationships    Social connections:     Talks on phone: Not on file     Gets together: Not on file     Attends Anabaptist service: Not on file     Active member of club or organization: Not on file     Attends meetings of clubs or organizations: Not on file     Relationship status: Not on file    Intimate partner violence:     Fear of current or ex partner: Not on file     Emotionally abused: Not on file     Physically abused: Not on file     Forced sexual activity: Not on file   Other Topics Concern    Not on file   Social History Narrative    Not on file     No Known Allergies    Review of Systems:   A comprehensive review of systems was negative except for that written in the HPI. Objective:   Physical Exam:     Visit Vitals  /63 (BP 1 Location: Left arm, BP Patient Position: At rest)   Pulse 99   Temp 98.2 °F (36.8 °C)   Resp 23   Ht 5' 6\" (1.676 m)   Wt 86.2 kg (190 lb)   SpO2 94%   BMI 30.67 kg/m²      O2 Device: Room air  Temp (24hrs), Av.7 °F (38.2 °C), Min:98.2 °F (36.8 °C), Max:102.7 °F (39.3 °C)    No intake/output data recorded.  1901 -  0700  In: 3472.1 [I.V.:2852.1]  Out: 5103 [Urine:1225]  General:  Alert, cooperative, no distress, appears stated age. Head:  Normocephalic, without obvious abnormality, atraumatic. Eyes:  Conjunctivae/corneas clear. PERRL, EOMs intact. Throat: Lips, mucosa, and tongue moist..   Neck: Supple, symmetrical, trachea midline, no adenopathy, thyroid: no enlargement/tenderness/nodules, no carotid bruit and no JVD. Lungs:   Clear to auscultation bilaterally. Chest wall:  No tenderness or deformity. Heart:  Rapid, regular rate and rhythm, no murmur, click, rub or gallop. Abdomen:   little tenderness this AM without rebound/guarding. BS present    Extremities: no cyanosis or edema. No calf tenderness or cords. Pulses: 2+ and symmetric all extremities. Skin: turgor normal. No rashes   Neurologic: CNII-XII intact. Alert and oriented X 3. Fine motor of hands and fingers normal.   equal.  No cogwheeling or rigidity. Gait not tested at this time. Sensation grossly normal to touch.   Gross motor of extremities normal.       Data Review:       Recent Days:  Recent Labs     19  0255 19  0154 19  1218 19  0600 19  0004  19  0130   WBC  --  8.4  --   --  8.4  --  11.7*   HGB 8.5* 6.5* 7.0* 7.3* 7.8*   < > 7.3*   HCT  --  18.7* 20.3* 21.5* 23.0*   < > 21.4*   PLT  --  113*  --   --  109*  --  144*    < > = values in this interval not displayed. Recent Labs     09/23/19  0154 09/22/19  0004 09/21/19  0130 09/20/19  1015    138 140 137   K 2.8* 3.6 3.8 3.7   * 104 106 103   CO2 23 29 26 28   GLU 98 121* 145* 131*   BUN 9 13 17 17   CREA 0.55* 0.88 0.92 0.94   CA 6.2* 7.6* 7.7* 8.5   MG 1.9 2.0  --   --    ALB 2.3* 3.1*  3.0* 3.2* 3.6   TBILI 1.4* 1.6*  1.6* 0.8 0.7   SGOT 1,208* >2,000*  >2,000* 826* 560*   ALT 2,568* >3,500*  >3,500* 1,450* 898*   INR 1.2* 1.2*  --  1.0     No results for input(s): PH, PCO2, PO2, HCO3, FIO2 in the last 72 hours. 24 Hour Results:  Recent Results (from the past 24 hour(s))   PLATELETS, ALLOCATE    Collection Time: 09/22/19 10:45 AM   Result Value Ref Range    Unit number [id number]     Blood component type PLTPH LR 3     Unit division 00     Status of unit ISSUED    HGB & HCT    Collection Time: 09/22/19 12:18 PM   Result Value Ref Range    HGB 7.0 (L) 12.1 - 17.0 g/dL    HCT 20.3 (L) 36.6 - 39.0 %   METABOLIC PANEL, COMPREHENSIVE    Collection Time: 09/23/19  1:54 AM   Result Value Ref Range    Sodium 141 136 - 145 mmol/L    Potassium 2.8 (L) 3.5 - 5.1 mmol/L    Chloride 111 (H) 97 - 108 mmol/L    CO2 23 21 - 32 mmol/L    Anion gap 7 5 - 15 mmol/L    Glucose 98 65 - 100 mg/dL    BUN 9 6 - 20 MG/DL    Creatinine 0.55 (L) 0.70 - 1.30 MG/DL    BUN/Creatinine ratio 16 12 - 20      GFR est AA >60 >60 ml/min/1.73m2    GFR est non-AA >60 >60 ml/min/1.73m2    Calcium 6.2 (LL) 8.5 - 10.1 MG/DL    Bilirubin, total 1.4 (H) 0.2 - 1.0 MG/DL    ALT (SGPT) 2,568 (H) 12 - 78 U/L    AST (SGOT) 1,208 (H) 15 - 37 U/L    Alk.  phosphatase 200 (H) 45 - 117 U/L    Protein, total 4.8 (L) 6.4 - 8.2 g/dL    Albumin 2.3 (L) 3.5 - 5.0 g/dL    Globulin 2.5 2.0 - 4.0 g/dL    A-G Ratio 0.9 (L) 1.1 - 2.2     COAGULATION SCREEN    Collection Time: 09/23/19  1:54 AM   Result Value Ref Range    INR 1.2 (H) 0.9 - 1.1      Prothrombin time 12.4 (H) 9.0 - 11.1 sec    aPTT 22.2 22.1 - 32.0 sec    aPTT, therapeutic range     58.0 - 77.0 SECS    Fibrinogen 374 200 - 475 mg/dL   CBC WITH AUTOMATED DIFF    Collection Time: 09/23/19  1:54 AM   Result Value Ref Range    WBC 8.4 4.1 - 11.1 K/uL    RBC 2.09 (L) 4.10 - 5.70 M/uL    HGB 6.5 (L) 12.1 - 17.0 g/dL    HCT 18.7 (L) 36.6 - 50.3 %    MCV 89.5 80.0 - 99.0 FL    MCH 31.1 26.0 - 34.0 PG    MCHC 34.8 30.0 - 36.5 g/dL    RDW 15.0 (H) 11.5 - 14.5 %    PLATELET 441 (L) 690 - 400 K/uL    MPV 9.3 8.9 - 12.9 FL    NRBC 0.8 (H) 0  WBC    ABSOLUTE NRBC 0.07 (H) 0.00 - 0.01 K/uL    NEUTROPHILS 69 32 - 75 %    BAND NEUTROPHILS 1 0 - 6 %    LYMPHOCYTES 21 12 - 49 %    MONOCYTES 9 5 - 13 %    EOSINOPHILS 0 0 - 7 %    BASOPHILS 0 0 - 1 %    IMMATURE GRANULOCYTES 0 %    ABS. NEUTROPHILS 5.8 1.8 - 8.0 K/UL    ABS. LYMPHOCYTES 1.8 0.8 - 3.5 K/UL    ABS. MONOCYTES 0.8 0.0 - 1.0 K/UL    ABS. EOSINOPHILS 0.0 0.0 - 0.4 K/UL    ABS. BASOPHILS 0.0 0.0 - 0.1 K/UL    ABS. IMM.  GRANS. 0.0 K/UL    DF MANUAL      RBC COMMENTS POLYCHROMASIA  1+       MAGNESIUM    Collection Time: 09/23/19  1:54 AM   Result Value Ref Range    Magnesium 1.9 1.6 - 2.4 mg/dL   HEMOGLOBIN    Collection Time: 09/23/19  2:55 AM   Result Value Ref Range    HGB 8.5 (L) 12.1 - 17.0 g/dL       Medications reviewed  Current Facility-Administered Medications   Medication Dose Route Frequency    potassium chloride 10 mEq in 100 ml IVPB  10 mEq IntraVENous Q1H    senna-docusate (PERICOLACE) 8.6-50 mg per tablet 1 Tab  1 Tab Oral DAILY    bisacodyl (DULCOLAX) suppository 10 mg  10 mg Rectal DAILY PRN    dextrose 5% - 0.9% NaCl with KCl 20 mEq/L infusion  125 mL/hr IntraVENous CONTINUOUS    0.9% sodium chloride infusion 250 mL  250 mL IntraVENous PRN    0.9% sodium chloride infusion 250 mL  250 mL IntraVENous PRN    0.9% sodium chloride infusion 250 mL  250 mL IntraVENous PRN    levoFLOXacin (LEVAQUIN) 500 mg in D5W IVPB  500 mg IntraVENous Q24H    metroNIDAZOLE (FLAGYL) IVPB premix 500 mg  500 mg IntraVENous Q8H    hydrALAZINE (APRESOLINE) 20 mg/mL injection 20 mg  20 mg IntraVENous Q6H PRN    sodium chloride (NS) flush 5-40 mL  5-40 mL IntraVENous Q8H    sodium chloride (NS) flush 5-40 mL  5-40 mL IntraVENous PRN    acetaminophen (TYLENOL) tablet 650 mg  650 mg Oral Q6H PRN    HYDROmorphone (PF) (DILAUDID) injection 0.5 mg  0.5 mg IntraVENous Q4H PRN    naloxone (NARCAN) injection 0.4 mg  0.4 mg IntraVENous PRN    ondansetron (ZOFRAN) injection 4 mg  4 mg IntraVENous Q4H PRN    HYDROcodone-acetaminophen (NORCO) 5-325 mg per tablet 1 Tab  1 Tab Oral Q4H PRN    HYDROcodone-acetaminophen (NORCO)  mg tablet 1 Tab  1 Tab Oral Q4H PRN    diphenhydrAMINE (BENADRYL) injection 12.5 mg  12.5 mg IntraVENous Q6H PRN     Care Plan discussed with: Patient and Nurse  Total time spent with patient: 30 minutes.   Marylen Ro, MD

## 2019-09-24 LAB
ABO + RH BLD: NORMAL
ALBUMIN SERPL-MCNC: 2.6 G/DL (ref 3.5–5)
ALBUMIN/GLOB SERPL: 0.9 {RATIO} (ref 1.1–2.2)
ALP SERPL-CCNC: 213 U/L (ref 45–117)
ALT SERPL-CCNC: 1833 U/L (ref 12–78)
ANION GAP SERPL CALC-SCNC: 6 MMOL/L (ref 5–15)
AST SERPL-CCNC: 433 U/L (ref 15–37)
BILIRUB SERPL-MCNC: 1.3 MG/DL (ref 0.2–1)
BLD PROD TYP BPU: NORMAL
BLOOD GROUP ANTIBODIES SERPL: NORMAL
BPU ID: NORMAL
BUN SERPL-MCNC: 9 MG/DL (ref 6–20)
BUN/CREAT SERPL: 15 (ref 12–20)
CALCIUM SERPL-MCNC: 7.5 MG/DL (ref 8.5–10.1)
CHLORIDE SERPL-SCNC: 106 MMOL/L (ref 97–108)
CO2 SERPL-SCNC: 25 MMOL/L (ref 21–32)
CREAT SERPL-MCNC: 0.62 MG/DL (ref 0.7–1.3)
CROSSMATCH RESULT,%XM: NORMAL
ERYTHROCYTE [DISTWIDTH] IN BLOOD BY AUTOMATED COUNT: 15.3 % (ref 11.5–14.5)
GLOBULIN SER CALC-MCNC: 2.9 G/DL (ref 2–4)
GLUCOSE SERPL-MCNC: 109 MG/DL (ref 65–100)
HCT VFR BLD AUTO: 23.8 % (ref 36.6–50.3)
HGB BLD-MCNC: 8.2 G/DL (ref 12.1–17)
MCH RBC QN AUTO: 31.1 PG (ref 26–34)
MCHC RBC AUTO-ENTMCNC: 34.5 G/DL (ref 30–36.5)
MCV RBC AUTO: 90.2 FL (ref 80–99)
NRBC # BLD: 0.06 K/UL (ref 0–0.01)
NRBC BLD-RTO: 0.5 PER 100 WBC
PLATELET # BLD AUTO: 164 K/UL (ref 150–400)
PMV BLD AUTO: 8.8 FL (ref 8.9–12.9)
POTASSIUM SERPL-SCNC: 3.4 MMOL/L (ref 3.5–5.1)
PROT SERPL-MCNC: 5.5 G/DL (ref 6.4–8.2)
RBC # BLD AUTO: 2.64 M/UL (ref 4.1–5.7)
SODIUM SERPL-SCNC: 137 MMOL/L (ref 136–145)
SPECIMEN EXP DATE BLD: NORMAL
STATUS OF UNIT,%ST: NORMAL
UNIT DIVISION, %UDIV: 0
WBC # BLD AUTO: 11.3 K/UL (ref 4.1–11.1)

## 2019-09-24 PROCEDURE — 74011250636 HC RX REV CODE- 250/636: Performed by: SURGERY

## 2019-09-24 PROCEDURE — 74011250636 HC RX REV CODE- 250/636: Performed by: PHYSICIAN ASSISTANT

## 2019-09-24 PROCEDURE — 77030027138 HC INCENT SPIROMETER -A

## 2019-09-24 PROCEDURE — 74011250637 HC RX REV CODE- 250/637: Performed by: SURGERY

## 2019-09-24 PROCEDURE — 85027 COMPLETE CBC AUTOMATED: CPT

## 2019-09-24 PROCEDURE — 74011250637 HC RX REV CODE- 250/637: Performed by: INTERNAL MEDICINE

## 2019-09-24 PROCEDURE — 80053 COMPREHEN METABOLIC PANEL: CPT

## 2019-09-24 PROCEDURE — 65660000000 HC RM CCU STEPDOWN

## 2019-09-24 PROCEDURE — 36415 COLL VENOUS BLD VENIPUNCTURE: CPT

## 2019-09-24 RX ADMIN — Medication 10 ML: at 21:45

## 2019-09-24 RX ADMIN — METRONIDAZOLE 500 MG: 500 INJECTION, SOLUTION INTRAVENOUS at 22:01

## 2019-09-24 RX ADMIN — METRONIDAZOLE 500 MG: 500 INJECTION, SOLUTION INTRAVENOUS at 16:11

## 2019-09-24 RX ADMIN — LEVOFLOXACIN 500 MG: 5 INJECTION, SOLUTION INTRAVENOUS at 08:06

## 2019-09-24 RX ADMIN — METRONIDAZOLE 500 MG: 500 INJECTION, SOLUTION INTRAVENOUS at 06:32

## 2019-09-24 RX ADMIN — Medication 10 ML: at 14:00

## 2019-09-24 RX ADMIN — OXYCODONE HYDROCHLORIDE 5 MG: 5 TABLET ORAL at 22:01

## 2019-09-24 RX ADMIN — SENNOSIDES,DOCUSATE SODIUM 1 TABLET: 8.6; 5 TABLET, FILM COATED ORAL at 08:05

## 2019-09-24 RX ADMIN — DEXTROSE MONOHYDRATE, SODIUM CHLORIDE, AND POTASSIUM CHLORIDE 75 ML/HR: 50; 9; 1.49 INJECTION, SOLUTION INTRAVENOUS at 06:32

## 2019-09-24 RX ADMIN — Medication 10 ML: at 06:33

## 2019-09-24 RX ADMIN — ONDANSETRON 4 MG: 2 INJECTION INTRAMUSCULAR; INTRAVENOUS at 00:08

## 2019-09-24 NOTE — PROGRESS NOTES
Family Practice Consult and Daily Progress Note: 9/24/2019  Renny Pierreivonne Neal Naz, DO    Assessment/Plan:   Liver hemorrhage following liver biopsy, perihepatic hematoma - s/p gelfoam slurry embolization of the left hepatic artery 9/20/19 by IR    Acute blood loss anemia - transfuse as needed. Hb 8.2 this am    HTN - holding BP meds for now    Hemochromatosis - monitor       Problem List:  Problem List as of 9/24/2019 Date Reviewed: 9/20/2019          Codes Class Noted - Resolved    Acute liver failure without hepatic coma ICD-10-CM: K72.00  ICD-9-CM: 570  9/23/2019 - Present        * (Principal) Perihepatic hematoma ICD-10-CM: K76.89  ICD-9-CM: 573.8  9/20/2019 - Present        HTN (hypertension) ICD-10-CM: I10  ICD-9-CM: 401.9  9/20/2019 - Present        Hemochromatosis ICD-10-CM: E83.119  ICD-9-CM: 275.03  9/20/2019 - Present        Hemorrhage following liver biopsy ICD-10-CM: K91.840  ICD-9-CM: 998.11  9/20/2019 - Present            Subjective: We are asked to see this 35 y.o. male for his hypertension. He is a pt of Dr Isela Serrano in our offices,  w/ history of HTN and hemochromatosis who presented to the ER on date of admission with c/o abdominal pain. He had undergone outpatient liver biopsy on 9/18 and developed abdominal pain following the procedure, was seen in the ER with negative findings on US 9/18 and released. On the day of admission labs revealed a decrease in hgb from 14 to 9 and CT shows large subcapsular hepatic hematoma with active extravasation. Currently he reports he reports \"just feel bad all over\" with no specific complaint. He is tachy and Hb is 7.2. For now will obviously hold BP meds. Also given his Hb has gone from 14ish to 7ish over 2 days, would consider transfusion of PRC. Will cont to follow. 9/22:  Last night had worsening ab pain but this morning feels better with much less ab pain.   Passed gas this AM.  Had 1 unit PRC yesterday and Hb was up to 7.8 afterwards. Still tachy - BP holding up ok though. Hb down to 7.3 this AM - may need more blood. LFTs markedly elevated - watch closely. GI consulted. 9/23:  He is feeling better this AM.  Tolerating liquids po. LFTs improving. Hb 8.5 this AM - was 6.5 at 0100 and received 2 units PRCs. K+ low - replacing. Ca+ is ok when corrected for low albumin - no replacement needed but cont to monitor. Tmax last night 102. Tnow 98.2. Watching temp curve. Poss CTA of ab planned. Transfusing as needed. 9/24: Feeling better. Able to tolerate liquids. Hb stable today at 8.2. LFTs trending down. Tmax 100.5. Less abd pain. Past Medical History:   Diagnosis Date    Hypertension      Past Surgical History:   Procedure Laterality Date    [facility]  [facility]     History reviewed. No pertinent family history.   Social History     Socioeconomic History    Marital status:      Spouse name: Not on file    Number of children: Not on file    Years of education: Not on file    Highest education level: Not on file   Occupational History    Not on file   Social Needs    Financial resource strain: Not on file    Food insecurity:     Worry: Not on file     Inability: Not on file    Transportation needs:     Medical: Not on file     Non-medical: Not on file   Tobacco Use    Smoking status: Never Smoker    Smokeless tobacco: Never Used   Substance and Sexual Activity    Alcohol use: Not Currently     Frequency: Never    Drug use: Not on file    Sexual activity: Not on file   Lifestyle    Physical activity:     Days per week: Not on file     Minutes per session: Not on file    Stress: Not on file   Relationships    Social connections:     Talks on phone: Not on file     Gets together: Not on file     Attends Christian service: Not on file     Active member of club or organization: Not on file     Attends meetings of clubs or organizations: Not on file     Relationship status: Not on file    Intimate partner violence:     Fear of current or ex partner: Not on file     Emotionally abused: Not on file     Physically abused: Not on file     Forced sexual activity: Not on file   Other Topics Concern    Not on file   Social History Narrative    Not on file     No Known Allergies    Review of Systems:   A comprehensive review of systems was negative except for that written in the HPI. Objective:   Physical Exam:     Visit Vitals  BP 97/56   Pulse 84   Temp 98.9 °F (37.2 °C)   Resp 19   Ht 5' 6\" (1.676 m)   Wt 190 lb (86.2 kg)   SpO2 96%   BMI 30.67 kg/m²      O2 Device: Room air  Temp (24hrs), Av.5 °F (37.5 °C), Min:98.1 °F (36.7 °C), Max:100.5 °F (38.1 °C)    No intake/output data recorded.  1901 -  0700  In: 5758.3 [I.V.:5138.3]  Out: -   General:  Alert, cooperative, no distress, appears stated age. Head:  Normocephalic, without obvious abnormality, atraumatic. Eyes:  Conjunctivae/corneas clear. PERRL, EOMs intact. Throat: Lips, mucosa, and tongue moist..   Neck: Supple, symmetrical, trachea midline, no adenopathy, thyroid: no enlargement/tenderness/nodules, no carotid bruit and no JVD. Lungs:   Clear to auscultation bilaterally. Chest wall:  No tenderness or deformity. Heart:  Rapid, regular rate and rhythm, no murmur, click, rub or gallop. Abdomen:   little tenderness this AM without rebound/guarding. BS present    Extremities: no cyanosis or edema. No calf tenderness or cords. Pulses: 2+ and symmetric all extremities. Skin: turgor normal. No rashes   Neurologic: CNII-XII intact. Alert and oriented X 3. Fine motor of hands and fingers normal.   equal.  No cogwheeling or rigidity. Gait not tested at this time. Sensation grossly normal to touch.   Gross motor of extremities normal.       Data Review:       Recent Days:  Recent Labs     19  0515 19  1617 19  0920  19  0154  19  0004   WBC 11.3*  --   --   --  8.4  -- 8. 4   HGB 8.2* 8.2* 8.5*   < > 6.5*   < > 7.8*   HCT 23.8* 23.6* 24.2*  --  18.7*   < > 23.0*     --   --   --  113*  --  109*    < > = values in this interval not displayed. Recent Labs     09/24/19  0515 09/23/19  1617 09/23/19  0154 09/22/19  0004    137 141 138   K 3.4* 3.6 2.8* 3.6    106 111* 104   CO2 25 26 23 29   * 115* 98 121*   BUN 9 9 9 13   CREA 0.62* 0.67* 0.55* 0.88   CA 7.5* 7.7* 6.2* 7.6*   MG  --   --  1.9 2.0   PHOS  --  0.8*  --   --    ALB 2.6*  --  2.3* 3.1*  3.0*   TBILI 1.3*  --  1.4* 1.6*  1.6*   SGOT 433*  --  1,208* >2,000*  >2,000*   ALT 1,833*  --  2,568* >3,500*  >3,500*   INR  --   --  1.2* 1.2*     No results for input(s): PH, PCO2, PO2, HCO3, FIO2 in the last 72 hours.     24 Hour Results:  Recent Results (from the past 24 hour(s))   HGB & HCT    Collection Time: 09/23/19  9:20 AM   Result Value Ref Range    HGB 8.5 (L) 12.1 - 17.0 g/dL    HCT 24.2 (L) 36.6 - 50.3 %   HGB & HCT    Collection Time: 09/23/19  4:17 PM   Result Value Ref Range    HGB 8.2 (L) 12.1 - 17.0 g/dL    HCT 23.6 (L) 36.6 - 40.2 %   METABOLIC PANEL, BASIC    Collection Time: 09/23/19  4:17 PM   Result Value Ref Range    Sodium 137 136 - 145 mmol/L    Potassium 3.6 3.5 - 5.1 mmol/L    Chloride 106 97 - 108 mmol/L    CO2 26 21 - 32 mmol/L    Anion gap 5 5 - 15 mmol/L    Glucose 115 (H) 65 - 100 mg/dL    BUN 9 6 - 20 MG/DL    Creatinine 0.67 (L) 0.70 - 1.30 MG/DL    BUN/Creatinine ratio 13 12 - 20      GFR est AA >60 >60 ml/min/1.73m2    GFR est non-AA >60 >60 ml/min/1.73m2    Calcium 7.7 (L) 8.5 - 10.1 MG/DL   PHOSPHORUS    Collection Time: 09/23/19  4:17 PM   Result Value Ref Range    Phosphorus 0.8 (LL) 2.6 - 4.7 MG/DL   CBC W/O DIFF    Collection Time: 09/24/19  5:15 AM   Result Value Ref Range    WBC 11.3 (H) 4.1 - 11.1 K/uL    RBC 2.64 (L) 4.10 - 5.70 M/uL    HGB 8.2 (L) 12.1 - 17.0 g/dL    HCT 23.8 (L) 36.6 - 50.3 %    MCV 90.2 80.0 - 99.0 FL    MCH 31.1 26.0 - 34.0 PG    MCHC 34.5 30.0 - 36.5 g/dL    RDW 15.3 (H) 11.5 - 14.5 %    PLATELET 177 146 - 207 K/uL    MPV 8.8 (L) 8.9 - 12.9 FL    NRBC 0.5 (H) 0  WBC    ABSOLUTE NRBC 0.06 (H) 0.00 - 6.18 K/uL   METABOLIC PANEL, COMPREHENSIVE    Collection Time: 09/24/19  5:15 AM   Result Value Ref Range    Sodium 137 136 - 145 mmol/L    Potassium 3.4 (L) 3.5 - 5.1 mmol/L    Chloride 106 97 - 108 mmol/L    CO2 25 21 - 32 mmol/L    Anion gap 6 5 - 15 mmol/L    Glucose 109 (H) 65 - 100 mg/dL    BUN 9 6 - 20 MG/DL    Creatinine 0.62 (L) 0.70 - 1.30 MG/DL    BUN/Creatinine ratio 15 12 - 20      GFR est AA >60 >60 ml/min/1.73m2    GFR est non-AA >60 >60 ml/min/1.73m2    Calcium 7.5 (L) 8.5 - 10.1 MG/DL    Bilirubin, total 1.3 (H) 0.2 - 1.0 MG/DL    ALT (SGPT) 1,833 (H) 12 - 78 U/L    AST (SGOT) 433 (H) 15 - 37 U/L    Alk.  phosphatase 213 (H) 45 - 117 U/L    Protein, total 5.5 (L) 6.4 - 8.2 g/dL    Albumin 2.6 (L) 3.5 - 5.0 g/dL    Globulin 2.9 2.0 - 4.0 g/dL    A-G Ratio 0.9 (L) 1.1 - 2.2         Medications reviewed  Current Facility-Administered Medications   Medication Dose Route Frequency    oxyCODONE IR (ROXICODONE) tablet 5 mg  5 mg Oral Q4H PRN    senna-docusate (PERICOLACE) 8.6-50 mg per tablet 1 Tab  1 Tab Oral DAILY    bisacodyl (DULCOLAX) suppository 10 mg  10 mg Rectal DAILY PRN    dextrose 5% - 0.9% NaCl with KCl 20 mEq/L infusion  75 mL/hr IntraVENous CONTINUOUS    0.9% sodium chloride infusion 250 mL  250 mL IntraVENous PRN    0.9% sodium chloride infusion 250 mL  250 mL IntraVENous PRN    0.9% sodium chloride infusion 250 mL  250 mL IntraVENous PRN    levoFLOXacin (LEVAQUIN) 500 mg in D5W IVPB  500 mg IntraVENous Q24H    metroNIDAZOLE (FLAGYL) IVPB premix 500 mg  500 mg IntraVENous Q8H    hydrALAZINE (APRESOLINE) 20 mg/mL injection 20 mg  20 mg IntraVENous Q6H PRN    sodium chloride (NS) flush 5-40 mL  5-40 mL IntraVENous Q8H    sodium chloride (NS) flush 5-40 mL  5-40 mL IntraVENous PRN    HYDROmorphone (PF) (DILAUDID) injection 0.5 mg  0.5 mg IntraVENous Q4H PRN    naloxone (NARCAN) injection 0.4 mg  0.4 mg IntraVENous PRN    ondansetron (ZOFRAN) injection 4 mg  4 mg IntraVENous Q4H PRN    diphenhydrAMINE (BENADRYL) injection 12.5 mg  12.5 mg IntraVENous Q6H PRN     Care Plan discussed with: Patient and Nurse  Total time spent with patient: 30 minutes.   Jennetta Severin, MD

## 2019-09-24 NOTE — PROGRESS NOTES
[address] NP  [phone]           GI PROGRESS NOTE        NAME: Kylah Mckinnon   :  1980   MRN:  [id number]       Subjective:   Has been able to eat more. Abdominal pain / pressure is decreasing. Objective: In NAD      VITALS:   Last 24hrs VS reviewed since prior progress note. Most recent are:  Visit Vitals  /71 (BP 1 Location: Right arm, BP Patient Position: At rest)   Pulse 94   Temp 98.6 °F (37 °C)   Resp 29   Ht 5' 6\" (1.676 m)   Wt 86.2 kg (190 lb)   SpO2 97%   BMI 30.67 kg/m²       Intake/Output Summary (Last 24 hours) at 2019 1415  Last data filed at 2019 1333  Gross per 24 hour   Intake 2544.08 ml   Output --   Net 2544.08 ml       PHYSICAL EXAM:  General: Alert, in no acute distress    HEENT: Anicteric sclerae. Lungs:            CTA Bilaterally. Heart:  Regular  rhythm,    Abdomen: Soft, moderately distended, TTP in LLQ.  (+)Bowel sounds, no HSM  Extremities: No c/c/e  Neurologic:  CN 2-12 gi, Alert and oriented X 3. No acute neurological distress   Psych:   Good insight. Not anxious nor agitated. Lab Data Reviewed:   Recent Labs     19   WBC 11.3*  --   --  8.4   HGB 8.2* 8.2*   < > 6.5*   HCT 23.8* 23.6*   < > 18.7*     --   --  113*    < > = values in this interval not displayed.      Recent Labs     19    137   K 3.4* 3.6    106   CO2 25 26   BUN 9 9   CREA 0.62* 0.67*   * 115*   PHOS  --  0.8*   CA 7.5* 7.7*     Recent Labs     19   SGOT 433* 1,208*   * 200*   TP 5.5* 4.8*   ALB 2.6* 2.3*   GLOB 2.9 2.5       ________________________________________________________________________  Patient Active Problem List   Diagnosis Code    Perihepatic hematoma K76.89    HTN (hypertension) I10    Hemochromatosis E83.119    Hemorrhage following liver biopsy K91.840    Acute liver failure without hepatic coma K72.00         Assessment and Plan:  Elevated LFTs Status Post liver biopsy  · LFT's are improving slowly as anticipated - likely ischemic injury from hepatic artery embolization  · Continue supportive care as planned  · Monitor labs closely and transfuse to keep Hgb > 7gms/dl  · His labs are negative for hemochromatosis. Ok to discharge tomorrow if LFTs continue to improve. Please have him follow up with Dr. Carolina King as outpatient. Please call with any questions or concerns.        Signed By: Israel France NP     9/24/2019  2:15 PM

## 2019-09-24 NOTE — PROGRESS NOTES
Problem: Falls - Risk of  Goal: *Absence of Falls  Description  Document Mehnaz Garrison Fall Risk and appropriate interventions in the flowsheet. Outcome: Progressing Towards Goal  Note:   Fall Risk Interventions:            Medication Interventions: Bed/chair exit alarm, Patient to call before getting OOB         History of Falls Interventions: Bed/chair exit alarm         Problem: Patient Education: Go to Patient Education Activity  Goal: Patient/Family Education  Outcome: Progressing Towards Goal     Problem: Pressure Injury - Risk of  Goal: *Prevention of pressure injury  Description  Document Maxi Scale and appropriate interventions in the flowsheet.   Outcome: Progressing Towards Goal  Note:   Pressure Injury Interventions:  Sensory Interventions: Maintain/enhance activity level         Activity Interventions: Increase time out of bed         Nutrition Interventions: Document food/fluid/supplement intake, Discuss nutritional consult with provider, Offer support with meals,snacks and hydration    Friction and Shear Interventions: Minimize layers                Problem: Patient Education: Go to Patient Education Activity  Goal: Patient/Family Education  Outcome: Progressing Towards Goal     Problem: Pain  Goal: *Control of Pain  Outcome: Progressing Towards Goal

## 2019-09-24 NOTE — PROGRESS NOTES
General Surgery Daily Progress Note    Patient: Bienvenido Martinez MRN: [id number]  SSN: [id number]    YOB: 1980  Age: 35 y.o. Sex: male      Admit Date: 9/20/2019    Subjective:   Pain controlled, tolerating diet, + BM and flatus. Hgb stable x 24 hours. Current Facility-Administered Medications   Medication Dose Route Frequency    oxyCODONE IR (ROXICODONE) tablet 5 mg  5 mg Oral Q4H PRN    senna-docusate (PERICOLACE) 8.6-50 mg per tablet 1 Tab  1 Tab Oral DAILY    bisacodyl (DULCOLAX) suppository 10 mg  10 mg Rectal DAILY PRN    0.9% sodium chloride infusion 250 mL  250 mL IntraVENous PRN    0.9% sodium chloride infusion 250 mL  250 mL IntraVENous PRN    0.9% sodium chloride infusion 250 mL  250 mL IntraVENous PRN    metroNIDAZOLE (FLAGYL) IVPB premix 500 mg  500 mg IntraVENous Q8H    hydrALAZINE (APRESOLINE) 20 mg/mL injection 20 mg  20 mg IntraVENous Q6H PRN    sodium chloride (NS) flush 5-40 mL  5-40 mL IntraVENous Q8H    sodium chloride (NS) flush 5-40 mL  5-40 mL IntraVENous PRN    HYDROmorphone (PF) (DILAUDID) injection 0.5 mg  0.5 mg IntraVENous Q4H PRN    naloxone (NARCAN) injection 0.4 mg  0.4 mg IntraVENous PRN    ondansetron (ZOFRAN) injection 4 mg  4 mg IntraVENous Q4H PRN    diphenhydrAMINE (BENADRYL) injection 12.5 mg  12.5 mg IntraVENous Q6H PRN        Objective:   09/24 0701 - 09/24 1900  In: 370 [P.O.:120;  I.V.:250]  Out: -   09/22 1901 - 09/24 0700  In: 5858.3 [I.V.:5238.3]  Out: -   Patient Vitals for the past 8 hrs:   BP Temp Pulse Resp SpO2   09/24/19 0917 106/72 -- 99 (!) 31 95 %   09/24/19 0900 (!) 93/35 -- 93 20 96 %   09/24/19 0800 110/73 98.1 °F (36.7 °C) 90 29 95 %   09/24/19 0700 121/82 -- 96 (!) 31 97 %   09/24/19 0600 97/56 -- 84 19 96 %   09/24/19 0500 104/73 -- 90 21 94 %   09/24/19 0400 94/60 98.9 °F (37.2 °C) 89 19 95 %   09/24/19 0300 102/73 -- 89 24 96 %       Physical Exam:  General: Alert, cooperative, NAD  Lungs: Unlabored  Heart:  RRR  Abdomen: Soft, mild upper abdominal tenderness, mildly distended. Extremities: Warm, moves all, no edema  Skin:  Warm and dry, no rash    Labs:   Recent Labs     09/24/19  0515   WBC 11.3*   HGB 8.2*   HCT 23.8*        Recent Labs     09/24/19  0515 09/23/19  1617 09/23/19  0154    137 141   K 3.4* 3.6 2.8*    106 111*   CO2 25 26 23   * 115* 98   BUN 9 9 9   CREA 0.62* 0.67* 0.55*   CA 7.5* 7.7* 6.2*   MG  --   --  1.9   PHOS  --  0.8*  --    ALB 2.6*  --  2.3*   TBILI 1.3*  --  1.4*   SGOT 433*  --  1,208*   ALT 1,833*  --  2,568*       Assessment / Plan:   · Left hepatic hematoma following liver biopsy  · S/p left hepatic artery embolization 9/20  · Hgb stable  · Transaminase elevation expected after hepatic embolization. Now trending down. · Pain improved  · Diet as tolerated  · Replete K+  · SCDs, no DVT chemoprophylaxis given question of ongoing bleeding. · OK to transfer to surgical floor  · Will complete ABX today  · Home tomorrow if he remains stable.

## 2019-09-24 NOTE — PROGRESS NOTES
TRANSFER - IN REPORT:    Verbal report received from [facility]. [address], RN (name) on Bienvenido Rear  being received from ICU (unit) for routine progression of care      Report consisted of patients Situation, Background, Assessment and   Recommendations(SBAR). Information from the following report(s) SBAR, Kardex, Intake/Output, MAR and Recent Results was reviewed with the receiving nurse. Opportunity for questions and clarification was provided. Assessment completed upon patients arrival to unit and care assumed.

## 2019-09-24 NOTE — PROGRESS NOTES
Shift Summary    0710:  Bedside report done. Patient resting quietly in bed. He is on room air with no signs of distress noted. Urinal and call bell at the bedside. No needs or complaints voiced at this time. 0815:  Patient assessed. He voices feeling better but says he is tired. No needs or complaints at thsi time. Dr. Armani Joseph at the bedside. OK to transfer to tele. 0917:  0900 BP low. Patient asymptomatic with his arm up over his head. Cuff readjusted and BP rechecked. See flowsheet. 1020: Patient resting quietly in bed. He would like to get up and walk when his wife comes in. Patient instructed to ring for nursing staff when he is ready to walk. 1100:  Patient unhooked from monitor and placed on tele box. He walked the unit with his wife. 1119:  Patient's wife assisting with his bath. Patient instructed to ring for nursing staff when finished so he can be hooked back up.    1200:  Patient reassessed. No changes noted. Patient up in chair with wife at the bedside. Wife assisted to call for lunch tray. No needs or complaints voiced at this time. 1344:  OK with Marley Munoz and surgery for transfer to remote tele. 1402:  Bed available on fourth floor. Charge nurse made aware. 1420:  Patient transferred up to fourth floor via wheelchair. Wife and RN with patient.

## 2019-09-24 NOTE — PROGRESS NOTES
9/24/2019  1:07 PM  EMR reviewed, pt is continuing to require medical management for Lt hepatic hematoma following liver biopsy, Hbg stable,  pt tolerating diet, possible d/c tomorrow if stable. D/C plan: home w/ family assistance, f/u w/ PCP and surgery, wife will transport.   Beatriz Heredia

## 2019-09-24 NOTE — ROUTINE PROCESS
0700 Shift change report received from Carisa mercedes, [facility]. SBAR, Kardex, Procedure Summary, Intake/Output, MAR, Accordion, Recent Results and Cardiac Rhythm SR reviewed. Primary Nurse Auther Lundborg and Carisa mercedes RN performed a dual skin assessment on this patient No impairment noted  Maxi score is 21    TRANSFER - OUT REPORT:    Verbal report given to [place](name) on [facility]  being transferred to Fourth floor remote tele(unit) for routine progression of care       Report consisted of patients Situation, Background, Assessment and   Recommendations(SBAR). Information from the following report(s) SBAR, Kardex, Procedure Summary, Intake/Output, MAR, Accordion, Recent Results and Cardiac Rhythm SR was reviewed with the receiving nurse. Lines:   Peripheral IV 09/20/19 Left Antecubital (Active)   Site Assessment Clean, dry, & intact 9/24/2019 12:00 PM   Phlebitis Assessment 0 9/24/2019 12:00 PM   Infiltration Assessment 0 9/24/2019 12:00 PM   Dressing Status Clean, dry, & intact 9/24/2019 12:00 PM   Dressing Type Transparent 9/24/2019 12:00 PM   Hub Color/Line Status Pink;Capped 9/24/2019 12:00 PM   Action Taken Open ports on tubing capped 9/24/2019 12:00 PM   Alcohol Cap Used Yes 9/24/2019 12:00 PM        Opportunity for questions and clarification was provided.       Patient transported with:   Monitor  Registered Nurse

## 2019-09-25 LAB
ALBUMIN SERPL-MCNC: 2.7 G/DL (ref 3.5–5)
ALBUMIN/GLOB SERPL: 0.9 {RATIO} (ref 1.1–2.2)
ALP SERPL-CCNC: 216 U/L (ref 45–117)
ALT SERPL-CCNC: 1415 U/L (ref 12–78)
ANION GAP SERPL CALC-SCNC: 6 MMOL/L (ref 5–15)
APPEARANCE UR: CLEAR
AST SERPL-CCNC: 228 U/L (ref 15–37)
BACTERIA URNS QL MICRO: NEGATIVE /HPF
BILIRUB SERPL-MCNC: 1.1 MG/DL (ref 0.2–1)
BILIRUB UR QL: NEGATIVE
BUN SERPL-MCNC: 9 MG/DL (ref 6–20)
BUN/CREAT SERPL: 13 (ref 12–20)
CALCIUM SERPL-MCNC: 7.6 MG/DL (ref 8.5–10.1)
CHLORIDE SERPL-SCNC: 103 MMOL/L (ref 97–108)
CO2 SERPL-SCNC: 26 MMOL/L (ref 21–32)
COLOR UR: ABNORMAL
CREAT SERPL-MCNC: 0.7 MG/DL (ref 0.7–1.3)
EPITH CASTS URNS QL MICRO: ABNORMAL /LPF
ERYTHROCYTE [DISTWIDTH] IN BLOOD BY AUTOMATED COUNT: 15.3 % (ref 11.5–14.5)
GLOBULIN SER CALC-MCNC: 3.1 G/DL (ref 2–4)
GLUCOSE SERPL-MCNC: 98 MG/DL (ref 65–100)
GLUCOSE UR STRIP.AUTO-MCNC: NEGATIVE MG/DL
HCT VFR BLD AUTO: 25.4 % (ref 36.6–50.3)
HGB BLD-MCNC: 8.7 G/DL (ref 12.1–17)
HGB UR QL STRIP: NEGATIVE
HYALINE CASTS URNS QL MICRO: ABNORMAL /LPF (ref 0–5)
KETONES UR QL STRIP.AUTO: NEGATIVE MG/DL
LEUKOCYTE ESTERASE UR QL STRIP.AUTO: ABNORMAL
MCH RBC QN AUTO: 30.9 PG (ref 26–34)
MCHC RBC AUTO-ENTMCNC: 34.3 G/DL (ref 30–36.5)
MCV RBC AUTO: 90.1 FL (ref 80–99)
NITRITE UR QL STRIP.AUTO: NEGATIVE
NRBC # BLD: 0.04 K/UL (ref 0–0.01)
NRBC BLD-RTO: 0.3 PER 100 WBC
PH UR STRIP: 6.5 [PH] (ref 5–8)
PHOSPHATE SERPL-MCNC: 2.5 MG/DL (ref 2.6–4.7)
PLATELET # BLD AUTO: 204 K/UL (ref 150–400)
PMV BLD AUTO: 8.5 FL (ref 8.9–12.9)
POTASSIUM SERPL-SCNC: 3.3 MMOL/L (ref 3.5–5.1)
PROT SERPL-MCNC: 5.8 G/DL (ref 6.4–8.2)
PROT UR STRIP-MCNC: NEGATIVE MG/DL
RBC # BLD AUTO: 2.82 M/UL (ref 4.1–5.7)
RBC #/AREA URNS HPF: ABNORMAL /HPF (ref 0–5)
SODIUM SERPL-SCNC: 135 MMOL/L (ref 136–145)
SP GR UR REFRACTOMETRY: 1.02 (ref 1–1.03)
UROBILINOGEN UR QL STRIP.AUTO: 4 EU/DL (ref 0.2–1)
WBC # BLD AUTO: 11.8 K/UL (ref 4.1–11.1)
WBC URNS QL MICRO: ABNORMAL /HPF (ref 0–4)

## 2019-09-25 PROCEDURE — 74011250637 HC RX REV CODE- 250/637: Performed by: INTERNAL MEDICINE

## 2019-09-25 PROCEDURE — 80053 COMPREHEN METABOLIC PANEL: CPT

## 2019-09-25 PROCEDURE — 87086 URINE CULTURE/COLONY COUNT: CPT

## 2019-09-25 PROCEDURE — 74011250636 HC RX REV CODE- 250/636: Performed by: SURGERY

## 2019-09-25 PROCEDURE — 85027 COMPLETE CBC AUTOMATED: CPT

## 2019-09-25 PROCEDURE — 36415 COLL VENOUS BLD VENIPUNCTURE: CPT

## 2019-09-25 PROCEDURE — 74011250637 HC RX REV CODE- 250/637: Performed by: PHYSICIAN ASSISTANT

## 2019-09-25 PROCEDURE — 84100 ASSAY OF PHOSPHORUS: CPT

## 2019-09-25 PROCEDURE — 87040 BLOOD CULTURE FOR BACTERIA: CPT

## 2019-09-25 PROCEDURE — 65660000000 HC RM CCU STEPDOWN

## 2019-09-25 PROCEDURE — 74011250637 HC RX REV CODE- 250/637: Performed by: SURGERY

## 2019-09-25 PROCEDURE — 81001 URINALYSIS AUTO W/SCOPE: CPT

## 2019-09-25 RX ORDER — POLYETHYLENE GLYCOL 3350 17 G/17G
17 POWDER, FOR SOLUTION ORAL DAILY
Status: DISCONTINUED | OUTPATIENT
Start: 2019-09-25 | End: 2019-09-26 | Stop reason: HOSPADM

## 2019-09-25 RX ADMIN — Medication 10 ML: at 22:00

## 2019-09-25 RX ADMIN — Medication 10 ML: at 05:05

## 2019-09-25 RX ADMIN — OXYCODONE HYDROCHLORIDE 5 MG: 5 TABLET ORAL at 15:55

## 2019-09-25 RX ADMIN — SODIUM CHLORIDE 250 ML: 900 INJECTION, SOLUTION INTRAVENOUS at 23:50

## 2019-09-25 RX ADMIN — OXYCODONE HYDROCHLORIDE 5 MG: 5 TABLET ORAL at 10:56

## 2019-09-25 RX ADMIN — POLYETHYLENE GLYCOL 3350 17 G: 17 POWDER, FOR SOLUTION ORAL at 18:10

## 2019-09-25 RX ADMIN — Medication 10 ML: at 13:21

## 2019-09-25 RX ADMIN — OXYCODONE HYDROCHLORIDE 5 MG: 5 TABLET ORAL at 01:53

## 2019-09-25 RX ADMIN — SENNOSIDES,DOCUSATE SODIUM 1 TABLET: 8.6; 5 TABLET, FILM COATED ORAL at 08:28

## 2019-09-25 RX ADMIN — OXYCODONE HYDROCHLORIDE 5 MG: 5 TABLET ORAL at 23:02

## 2019-09-25 RX ADMIN — OXYCODONE HYDROCHLORIDE 5 MG: 5 TABLET ORAL at 05:44

## 2019-09-25 NOTE — PROGRESS NOTES
General Surgery Daily Progress Note    Patient: Fermin Vivas MRN: [id number]  SSN: [id number]    YOB: 1980  Age: 35 y.o. Sex: male      Admit Date: 9/20/2019    Subjective:   Has new pain in the LUQ. No N/V. Tolerating diet. Current Facility-Administered Medications   Medication Dose Route Frequency    oxyCODONE IR (ROXICODONE) tablet 5 mg  5 mg Oral Q4H PRN    senna-docusate (PERICOLACE) 8.6-50 mg per tablet 1 Tab  1 Tab Oral DAILY    bisacodyl (DULCOLAX) suppository 10 mg  10 mg Rectal DAILY PRN    0.9% sodium chloride infusion 250 mL  250 mL IntraVENous PRN    0.9% sodium chloride infusion 250 mL  250 mL IntraVENous PRN    0.9% sodium chloride infusion 250 mL  250 mL IntraVENous PRN    hydrALAZINE (APRESOLINE) 20 mg/mL injection 20 mg  20 mg IntraVENous Q6H PRN    sodium chloride (NS) flush 5-40 mL  5-40 mL IntraVENous Q8H    sodium chloride (NS) flush 5-40 mL  5-40 mL IntraVENous PRN    HYDROmorphone (PF) (DILAUDID) injection 0.5 mg  0.5 mg IntraVENous Q4H PRN    naloxone (NARCAN) injection 0.4 mg  0.4 mg IntraVENous PRN    ondansetron (ZOFRAN) injection 4 mg  4 mg IntraVENous Q4H PRN    diphenhydrAMINE (BENADRYL) injection 12.5 mg  12.5 mg IntraVENous Q6H PRN        Objective:   No intake/output data recorded. 09/23 1901 - 09/25 0700  In: 1990 [P.O.:360; I.V.:1630]  Out: 800 [Urine:800]  Patient Vitals for the past 8 hrs:   BP Temp Pulse Resp SpO2   09/25/19 1138 99/61 99.2 °F (37.3 °C) 94 18 95 %   09/25/19 0757 108/67 100.1 °F (37.8 °C) 98 18 95 %   09/25/19 0700 -- -- 87 -- --       Physical Exam:  General: Alert, cooperative, NAD  Lungs: Unlabored  Heart:  RRR  Abdomen: Soft, mild upper abdominal tenderness, mildly distended.    Extremities: Warm, moves all, no edema  Skin:  Warm and dry, no rash    Labs:   Recent Labs     09/25/19  0137   WBC 11.8*   HGB 8.7*   HCT 25.4*        Recent Labs     09/25/19  0137  09/23/19  1617 09/23/19  0154   *   < > 137 141 K 3.3*   < > 3.6 2.8*      < > 106 111*   CO2 26   < > 26 23   GLU 98   < > 115* 98   BUN 9   < > 9 9   CREA 0.70   < > 0.67* 0.55*   CA 7.6*   < > 7.7* 6.2*   MG  --   --   --  1.9   PHOS  --   --  0.8*  --    ALB 2.7*   < >  --  2.3*   TBILI 1.1*   < >  --  1.4*   SGOT 228*   < >  --  1,208*   ALT 1,415*   < >  --  2,568*    < > = values in this interval not displayed. Assessment / Plan:   · Left hepatic hematoma following liver biopsy  · S/p left hepatic artery embolization 9/20  · Hgb stable  · Transaminase elevation expected after hepatic embolization. Now trending down. · Diet as tolerated  · SCDs, no DVT chemoprophylaxis given question of ongoing bleeding. · Fever overnight. ABX completed yesterday. FP has ordered cultures. Monitor   · Will continue to watch for now. If he has persistent fevers and/or worsening pain could repeat imaging.

## 2019-09-25 NOTE — PROGRESS NOTES
Nutrition Assessment:    RECOMMENDATIONS/INTERVENTION(S):   1. Continue GI lite diet. 2. Continue Ensure Clear BID to promote adequate po intake. 3. Phos critically low, recommend repleting. 4. Continue to monitor po intake, wt changes, labs. ASSESSMENT:   9/25: Pt assessed for LOS. Admitted with perihepatic hematoma. PMH includes HTN. BMI 30.6, c/w obesity. Pt reports poor appetite x1 week d/t abd pain and early satiety. Reports good appetite and po intake prior to 1 week ago. Recorded intakes 50%. Says he ate about 75% of breakfast this AM. Reports no recent wt changes. No n/v. Says he has had occasional constipation recently but last BM yesterday. Receiving Ensure Clear and likes it, will continue to send. Phos 0.8 low, recommend repleting. Will continue to monitor po intakes, labs, wt changes. Labs- Phos 0.8, Na 135, K 3.3, Ca 7.6. Meds- flagyl, pericolace. Diet Order: Other (comment) GI lite  % Eaten:    Patient Vitals for the past 72 hrs:   % Diet Eaten   09/24/19 1333 50 %   09/24/19 0800 50 %   09/23/19 0923 240 %       Pertinent Medications: [x] Reviewed    Labs: [x] Reviewed    Anthropometrics: Height: 5' 6\" (167.6 cm) Weight: 86.2 kg (190 lb)    IBW (%IBW):   ( ) UBW (%UBW):   (  %)      BMI: Body mass index is 30.67 kg/m². This BMI is indicative of:   [] Underweight    [] Normal    [] Overweight    [x]  Obesity    []  Extreme Obesity (BMI>40)  Estimated Nutrition Needs (Based on): 8364 Kcals/day(1710 x 1.3 AF) , 86 g(0.8-1 g/kg) Protein  Carbohydrate: At Least 130 g/day  Fluids: 2223 mL/day (1 ml/kcal)    Last BM: 9/24   [x]Active     []Hyperactive  []Hypoactive       [] Absent   BS  Skin:    [x] Intact   [] Incision  [] Breakdown   [] DTI   [] Tears/Excoriation/Abrasion  []Edema [] Other:      Wt Readings from Last 30 Encounters:   09/20/19 86.2 kg (190 lb)   09/18/19 86.2 kg (190 lb)      NUTRITION DIAGNOSES:   Problem:  Inadequate oral intake     Etiology: related to decreased ability to consume adeqaute po intake     Signs/Symptoms: as evidenced by self-reported poor appetite x1 week      NUTRITION INTERVENTIONS:  Meals/Snacks: General/healthful diet   Supplements: Commercial supplement              GOAL:   PO intake >75% meals + ONS next 3-5 days    Cultural, Christian, or Ethnic Dietary Needs: None     EDUCATION & DISCHARGE NEEDS:    [x] None Identified   [] Identified and Education Provided/Documented   [] Identified and Pt declined/was not appropriate      [] Interdisciplinary Care Plan Reviewed/Documented    [x] Discharge Needs:    Regular diet as tolerated   [] No Nutrition Related Discharge Needs    NUTRITION RISK:   Pt Is At Nutrition Risk  [x]     No Nutrition Risk Identified  []       PT SEEN FOR:    []  MD Consult: []Calorie Count      []Diabetic Diet Education        []Diet Education     []Electrolyte Management     []General Nutrition Management and Supplements     []Management of Tube Feeding     []TPN Recommendations    []  RN Referral:  []MST score >=2     []Enteral/Parenteral Nutrition PTA     []Pregnant: Gestational DM or Multigestation                 [] Pressure Ulcer    []  Low BMI      [x]  Length of Stay       [] Dysphagia Diet         [] Ventilator  []  Follow-up     Previous Recommendations:   [] Implemented          [] Not Implemented          [x] Not Applicable    Previous Goal:   [] Met              [] Progressing Towards Goal              [] Not Progressing Towards Goal   [x] Not Applicable            Bard Medina, [address] [facility]  Pager [phone]  Phone [phone]

## 2019-09-25 NOTE — PROGRESS NOTES
Family Practice Consult and Daily Progress Note: 9/25/2019  Renny Bowser Pratibha Neal Naz, DO    Assessment/Plan:   Liver hemorrhage following liver biopsy, perihepatic hematoma - s/p gelfoam slurry embolization of the left hepatic artery 9/20/19 by IR    Acute blood loss anemia - transfuse as needed. Hb stable    HTN - still holding BP meds     Hemochromatosis - monitor    Fever- check BC and UA/UC       Problem List:  Problem List as of 9/25/2019 Date Reviewed: 9/20/2019          Codes Class Noted - Resolved    Acute liver failure without hepatic coma ICD-10-CM: K72.00  ICD-9-CM: 570  9/23/2019 - Present        * (Principal) Perihepatic hematoma ICD-10-CM: K76.89  ICD-9-CM: 573.8  9/20/2019 - Present        HTN (hypertension) ICD-10-CM: I10  ICD-9-CM: 401.9  9/20/2019 - Present        Hemochromatosis ICD-10-CM: E83.119  ICD-9-CM: 275.03  9/20/2019 - Present        Hemorrhage following liver biopsy ICD-10-CM: K91.840  ICD-9-CM: 998.11  9/20/2019 - Present            Subjective: We are asked to see this 35 y.o. male for his hypertension. He is a pt of Dr Isela Serrano in our offices,  w/ history of HTN and hemochromatosis who presented to the ER on date of admission with c/o abdominal pain. He had undergone outpatient liver biopsy on 9/18 and developed abdominal pain following the procedure, was seen in the ER with negative findings on US 9/18 and released. On the day of admission labs revealed a decrease in hgb from 14 to 9 and CT shows large subcapsular hepatic hematoma with active extravasation. Currently he reports he reports \"just feel bad all over\" with no specific complaint. He is tachy and Hb is 7.2. For now will obviously hold BP meds. Also given his Hb has gone from 14ish to 7ish over 2 days, would consider transfusion of PRC. Will cont to follow. 9/22:  Last night had worsening ab pain but this morning feels better with much less ab pain.   Passed gas this AM.  Had 1 [facility] yesterday and Hb was up to 7.8 afterwards. Still tachy - BP holding up ok though. Hb down to 7.3 this AM - may need more blood. LFTs markedly elevated - watch closely. GI consulted. 9/23:  He is feeling better this AM.  Tolerating liquids po. LFTs improving. Hb 8.5 this AM - was 6.5 at 0100 and received 2 units PRCs. K+ low - replacing. Ca+ is ok when corrected for low albumin - no replacement needed but cont to monitor. Tmax last night 102. Tnow 98.2. Watching temp curve. Poss CTA of ab planned. Transfusing as needed. 9/24: Feeling better. Able to tolerate liquids. Hb stable today at 8.2. LFTs trending down. Tmax 100.5. Less abd pain. 9/25: Feeling some better. Having some c/o left sided abd pain. Had small BM yesterday am. Tmax 101 last night. No urinary symptoms. No nausea. Past Medical History:   Diagnosis Date    Hypertension      Past Surgical History:   Procedure Laterality Date    RADHA [facility] W SI  9/20/2019     History reviewed. No pertinent family history.   Social History     Socioeconomic History    Marital status:      Spouse name: Not on file    Number of children: Not on file    Years of education: Not on file    Highest education level: Not on file   Occupational History    Not on file   Social Needs    Financial resource strain: Not on file    Food insecurity:     Worry: Not on file     Inability: Not on file    Transportation needs:     Medical: Not on file     Non-medical: Not on file   Tobacco Use    Smoking status: Never Smoker    Smokeless tobacco: Never Used   Substance and Sexual Activity    Alcohol use: Not Currently     Frequency: Never    Drug use: Not on file    Sexual activity: Not on file   Lifestyle    Physical activity:     Days per week: Not on file     Minutes per session: Not on file    Stress: Not on file   Relationships    Social connections:     Talks on phone: Not on file     Gets together: Not on file     Attends Orthodox service: Not on file     Active member of club or organization: Not on file     Attends meetings of clubs or organizations: Not on file     Relationship status: Not on file    Intimate partner violence:     Fear of current or ex partner: Not on file     Emotionally abused: Not on file     Physically abused: Not on file     Forced sexual activity: Not on file   Other Topics Concern    Not on file   Social History Narrative    Not on file     No Known Allergies    Review of Systems:   A comprehensive review of systems was negative except for that written in the HPI. Objective:   Physical Exam:     Visit Vitals  /59 (BP 1 Location: Right arm, BP Patient Position: At rest)   Pulse 84   Temp 98.9 °F (37.2 °C)   Resp 18   Ht 5' 6\" (1.676 m)   Wt 190 lb (86.2 kg)   SpO2 95%   BMI 30.67 kg/m²      O2 Device: Room air  Temp (24hrs), Av.4 °F (37.4 °C), Min:98.1 °F (36.7 °C), Max:101.1 °F (38.4 °C)    No intake/output data recorded.  1901 -  0700  In:  [P.O.:360; I.V.:1630]  Out: 800 [Urine:800]  General:  Alert, cooperative, no distress, appears stated age. Head:  Normocephalic, without obvious abnormality, atraumatic. Eyes:  Conjunctivae/corneas clear. PERRL, EOMs intact. Throat: Lips, mucosa, and tongue moist..   Neck: Supple, symmetrical, trachea midline, no adenopathy, thyroid: no enlargement/tenderness/nodules, no carotid bruit and no JVD. Lungs:   Clear to auscultation bilaterally. Chest wall:  No tenderness or deformity. Heart:  Regular rate and rhythm, no murmur, click, rub or gallop. Abdomen:   little tenderness this AM without rebound/guarding. BS present    Extremities: no cyanosis or edema. No calf tenderness or cords. Pulses: 2+ and symmetric all extremities. Skin: turgor normal. No rashes   Neurologic: CNII-XII intact. Alert and oriented X 3. Fine motor of hands and fingers normal.   equal.  No cogwheeling or rigidity. Gait not tested at this time. Sensation grossly normal to touch. Gross motor of extremities normal.       Data Review:       Recent Days:  Recent Labs     09/25/19 0137 09/24/19 0515 09/23/19 1617 09/23/19 0154   WBC 11.8* 11.3*  --   --  8.4   HGB 8.7* 8.2* 8.2*   < > 6.5*   HCT 25.4* 23.8* 23.6*   < > 18.7*    164  --   --  113*    < > = values in this interval not displayed. Recent Labs     09/25/19 0137 09/24/19 0515 09/23/19 1617 09/23/19 0154   * 137 137 141   K 3.3* 3.4* 3.6 2.8*    106 106 111*   CO2 26 25 26 23   GLU 98 109* 115* 98   BUN 9 9 9 9   CREA 0.70 0.62* 0.67* 0.55*   CA 7.6* 7.5* 7.7* 6.2*   MG  --   --   --  1.9   PHOS  --   --  0.8*  --    ALB 2.7* 2.6*  --  2.3*   TBILI 1.1* 1.3*  --  1.4*   SGOT 228* 433*  --  1,208*   ALT 1,415* 1,833*  --  2,568*   INR  --   --   --  1.2*     No results for input(s): PH, PCO2, PO2, HCO3, FIO2 in the last 72 hours.     24 Hour Results:  Recent Results (from the past 24 hour(s))   CBC W/O DIFF    Collection Time: 09/25/19  1:37 AM   Result Value Ref Range    WBC 11.8 (H) 4.1 - 11.1 K/uL    RBC 2.82 (L) 4.10 - 5.70 M/uL    HGB 8.7 (L) 12.1 - 17.0 g/dL    HCT 25.4 (L) 36.6 - 50.3 %    MCV 90.1 80.0 - 99.0 FL    MCH 30.9 26.0 - 34.0 PG    MCHC 34.3 30.0 - 36.5 g/dL    RDW 15.3 (H) 11.5 - 14.5 %    PLATELET 771 412 - 437 K/uL    MPV 8.5 (L) 8.9 - 12.9 FL    NRBC 0.3 (H) 0  WBC    ABSOLUTE NRBC 0.04 (H) 0.00 - 7.93 K/uL   METABOLIC PANEL, COMPREHENSIVE    Collection Time: 09/25/19  1:37 AM   Result Value Ref Range    Sodium 135 (L) 136 - 145 mmol/L    Potassium 3.3 (L) 3.5 - 5.1 mmol/L    Chloride 103 97 - 108 mmol/L    CO2 26 21 - 32 mmol/L    Anion gap 6 5 - 15 mmol/L    Glucose 98 65 - 100 mg/dL    BUN 9 6 - 20 MG/DL    Creatinine 0.70 0.70 - 1.30 MG/DL    BUN/Creatinine ratio 13 12 - 20      GFR est AA >60 >60 ml/min/1.73m2    GFR est non-AA >60 >60 ml/min/1.73m2    Calcium 7.6 (L) 8.5 - 10.1 MG/DL    Bilirubin, total 1.1 (H) 0.2 - 1.0 MG/DL    ALT (SGPT) 1,415 (H) 12 - 78 U/L    AST (SGOT) 228 (H) 15 - 37 U/L    Alk. phosphatase 216 (H) 45 - 117 U/L    Protein, total 5.8 (L) 6.4 - 8.2 g/dL    Albumin 2.7 (L) 3.5 - 5.0 g/dL    Globulin 3.1 2.0 - 4.0 g/dL    A-G Ratio 0.9 (L) 1.1 - 2.2         Medications reviewed  Current Facility-Administered Medications   Medication Dose Route Frequency    oxyCODONE IR (ROXICODONE) tablet 5 mg  5 mg Oral Q4H PRN    senna-docusate (PERICOLACE) 8.6-50 mg per tablet 1 Tab  1 Tab Oral DAILY    bisacodyl (DULCOLAX) suppository 10 mg  10 mg Rectal DAILY PRN    0.9% sodium chloride infusion 250 mL  250 mL IntraVENous PRN    0.9% sodium chloride infusion 250 mL  250 mL IntraVENous PRN    0.9% sodium chloride infusion 250 mL  250 mL IntraVENous PRN    hydrALAZINE (APRESOLINE) 20 mg/mL injection 20 mg  20 mg IntraVENous Q6H PRN    sodium chloride (NS) flush 5-40 mL  5-40 mL IntraVENous Q8H    sodium chloride (NS) flush 5-40 mL  5-40 mL IntraVENous PRN    HYDROmorphone (PF) (DILAUDID) injection 0.5 mg  0.5 mg IntraVENous Q4H PRN    naloxone (NARCAN) injection 0.4 mg  0.4 mg IntraVENous PRN    ondansetron (ZOFRAN) injection 4 mg  4 mg IntraVENous Q4H PRN    diphenhydrAMINE (BENADRYL) injection 12.5 mg  12.5 mg IntraVENous Q6H PRN     Care Plan discussed with: Patient and Nurse  Total time spent with patient: 30 minutes.   Malathi Celeste MD

## 2019-09-25 NOTE — PROGRESS NOTES
Problem: Falls - Risk of  Goal: *Absence of Falls  Description  Document Donata Carrel Fall Risk and appropriate interventions in the flowsheet. Outcome: Progressing Towards Goal  Note:   Fall Risk Interventions:            Medication Interventions: Bed/chair exit alarm, Patient to call before getting OOB, Teach patient to arise slowly         History of Falls Interventions: Bed/chair exit alarm         Problem: Patient Education: Go to Patient Education Activity  Goal: Patient/Family Education  Outcome: Progressing Towards Goal     Problem: Pressure Injury - Risk of  Goal: *Prevention of pressure injury  Description  Document Maxi Scale and appropriate interventions in the flowsheet.   Outcome: Progressing Towards Goal  Note:   Pressure Injury Interventions:  Sensory Interventions: Assess changes in LOC, Keep linens dry and wrinkle-free, Minimize linen layers         Activity Interventions: Increase time out of bed, Pressure redistribution bed/mattress(bed type)         Nutrition Interventions: Document food/fluid/supplement intake    Friction and Shear Interventions: HOB 30 degrees or less                Problem: Patient Education: Go to Patient Education Activity  Goal: Patient/Family Education  Outcome: Progressing Towards Goal     Problem: Pain  Goal: *Control of Pain  Outcome: Progressing Towards Goal

## 2019-09-25 NOTE — PROGRESS NOTES
Bedside and Verbal shift change report given to Allyson Plunkett (oncoming nurse) by Breann Parish (offgoing nurse). Report included the following information SBAR and Kardex.

## 2019-09-25 NOTE — PROGRESS NOTES
[id number]: Patient with concerns about no BM today. Spoke to [employer]. Orders for miralax daily. Orders placed. Bedside shift change report given to Kassandra Shepard RN (oncoming nurse) by Fany Cortes RN (offgoing nurse).  Report included the following information SBAR, Kardex, Intake/Output, MAR, Recent Results and Cardiac Rhythm NSR. '

## 2019-09-26 ENCOUNTER — APPOINTMENT (OUTPATIENT)
Dept: GENERAL RADIOLOGY | Age: 39
DRG: 907 | End: 2019-09-26
Attending: FAMILY MEDICINE
Payer: COMMERCIAL

## 2019-09-26 VITALS
WEIGHT: 190 LBS | DIASTOLIC BLOOD PRESSURE: 60 MMHG | HEART RATE: 93 BPM | RESPIRATION RATE: 16 BRPM | OXYGEN SATURATION: 95 % | TEMPERATURE: 99.3 F | SYSTOLIC BLOOD PRESSURE: 103 MMHG | BODY MASS INDEX: 30.53 KG/M2 | HEIGHT: 66 IN

## 2019-09-26 LAB
ALBUMIN SERPL-MCNC: 2.8 G/DL (ref 3.5–5)
ALBUMIN/GLOB SERPL: 0.8 {RATIO} (ref 1.1–2.2)
ALP SERPL-CCNC: 203 U/L (ref 45–117)
ALT SERPL-CCNC: 996 U/L (ref 12–78)
ANION GAP SERPL CALC-SCNC: 4 MMOL/L (ref 5–15)
AST SERPL-CCNC: 134 U/L (ref 15–37)
BACTERIA SPEC CULT: NORMAL
BILIRUB SERPL-MCNC: 1 MG/DL (ref 0.2–1)
BUN SERPL-MCNC: 10 MG/DL (ref 6–20)
BUN/CREAT SERPL: 13 (ref 12–20)
CALCIUM SERPL-MCNC: 7.8 MG/DL (ref 8.5–10.1)
CC UR VC: NORMAL
CHLORIDE SERPL-SCNC: 103 MMOL/L (ref 97–108)
CO2 SERPL-SCNC: 27 MMOL/L (ref 21–32)
CREAT SERPL-MCNC: 0.76 MG/DL (ref 0.7–1.3)
ERYTHROCYTE [DISTWIDTH] IN BLOOD BY AUTOMATED COUNT: 15.2 % (ref 11.5–14.5)
GLOBULIN SER CALC-MCNC: 3.4 G/DL (ref 2–4)
GLUCOSE SERPL-MCNC: 99 MG/DL (ref 65–100)
HCT VFR BLD AUTO: 27.4 % (ref 36.6–50.3)
HGB BLD-MCNC: 9.2 G/DL (ref 12.1–17)
MCH RBC QN AUTO: 30.5 PG (ref 26–34)
MCHC RBC AUTO-ENTMCNC: 33.6 G/DL (ref 30–36.5)
MCV RBC AUTO: 90.7 FL (ref 80–99)
NRBC # BLD: 0.03 K/UL (ref 0–0.01)
NRBC BLD-RTO: 0.3 PER 100 WBC
PLATELET # BLD AUTO: 249 K/UL (ref 150–400)
PMV BLD AUTO: 8.4 FL (ref 8.9–12.9)
POTASSIUM SERPL-SCNC: 3.7 MMOL/L (ref 3.5–5.1)
PROT SERPL-MCNC: 6.2 G/DL (ref 6.4–8.2)
RBC # BLD AUTO: 3.02 M/UL (ref 4.1–5.7)
SERVICE CMNT-IMP: NORMAL
SODIUM SERPL-SCNC: 134 MMOL/L (ref 136–145)
WBC # BLD AUTO: 11.6 K/UL (ref 4.1–11.1)

## 2019-09-26 PROCEDURE — 74011250637 HC RX REV CODE- 250/637: Performed by: INTERNAL MEDICINE

## 2019-09-26 PROCEDURE — 74011250637 HC RX REV CODE- 250/637: Performed by: SURGERY

## 2019-09-26 PROCEDURE — 71046 X-RAY EXAM CHEST 2 VIEWS: CPT

## 2019-09-26 PROCEDURE — 36415 COLL VENOUS BLD VENIPUNCTURE: CPT

## 2019-09-26 PROCEDURE — 74011250637 HC RX REV CODE- 250/637: Performed by: PHYSICIAN ASSISTANT

## 2019-09-26 PROCEDURE — 80053 COMPREHEN METABOLIC PANEL: CPT

## 2019-09-26 PROCEDURE — 85027 COMPLETE CBC AUTOMATED: CPT

## 2019-09-26 RX ORDER — CEFDINIR 300 MG/1
300 CAPSULE ORAL 2 TIMES DAILY
Qty: 14 CAP | Refills: 0 | Status: SHIPPED | OUTPATIENT
Start: 2019-09-26 | End: 2020-12-11

## 2019-09-26 RX ORDER — OXYCODONE HYDROCHLORIDE 5 MG/1
5-10 TABLET ORAL
Qty: 30 TAB | Refills: 0 | Status: SHIPPED | OUTPATIENT
Start: 2019-09-26 | End: 2019-10-01

## 2019-09-26 RX ADMIN — POLYETHYLENE GLYCOL 3350 17 G: 17 POWDER, FOR SOLUTION ORAL at 08:39

## 2019-09-26 RX ADMIN — Medication 10 ML: at 06:00

## 2019-09-26 RX ADMIN — SENNOSIDES,DOCUSATE SODIUM 1 TABLET: 8.6; 5 TABLET, FILM COATED ORAL at 08:39

## 2019-09-26 RX ADMIN — OXYCODONE HYDROCHLORIDE 5 MG: 5 TABLET ORAL at 08:39

## 2019-09-26 NOTE — PROGRESS NOTES
Bedside and Verbal shift change report given to Jovani Mercedes (oncoming nurse) by Stew Mosher (offgoing nurse). Report included the following information SBAR, Kardex, Procedure Summary, Intake/Output and MAR. Old incision healed, to abdomen.

## 2019-09-26 NOTE — DISCHARGE INSTRUCTIONS
Patient Discharge Instructions    Jose Parr / [id number] : 1980    Admitted 2019 Discharged: 2019     Take Home Medications       · It is important that you take the medication exactly as they are prescribed. · Keep your medication in the bottles provided by the pharmacist and keep a list of the medication names, dosages, and times to be taken in your wallet. · Do not take other medications without consulting your doctor. · Do not drive, drink alcohol, or operate machinery when taking sedating pain medication. · Take colace daily while on pain medication to help prevent constipation. You may take over the counter laxatives such as Dulcolax or Miralax as needed for constipation  · Your blood pressure has been low due to blood loss. Do not resume your high blood pressure medications until you have followed up with your family doctor and he has instructed you to do so. What to do at Home    Recommended diet: Regular Diet    Follow up with your gastroenterologist to review liver biopsy results    Follow up with your family doctor within 1 week. Follow up with Dr. Mirza Jones as needed. Call [facility] to make an appointment. Call Dr. iMrza Jones or go to the ER if you develop worsening pain, fever, vomiting, dizziness, or any other symptoms of concern.

## 2019-09-26 NOTE — PROGRESS NOTES
Bedside and Verbal shift change report given to Aishwarya Arroyo (oncoming nurse) by Tony Martinez RN (offgoing nurse). Report included the following information SBAR, Kardex, Intake/Output, MAR and Recent Results.

## 2019-09-26 NOTE — PROGRESS NOTES
Problem: Falls - Risk of  Goal: *Absence of Falls  Description  Document Karon Brantley Fall Risk and appropriate interventions in the flowsheet. Outcome: Progressing Towards Goal  Note:   Fall Risk Interventions:            Medication Interventions: Teach patient to arise slowly         History of Falls Interventions: Bed/chair exit alarm         Problem: Patient Education: Go to Patient Education Activity  Goal: Patient/Family Education  Outcome: Progressing Towards Goal     Problem: Pressure Injury - Risk of  Goal: *Prevention of pressure injury  Description  Document Maxi Scale and appropriate interventions in the flowsheet.   Outcome: Progressing Towards Goal  Note:   Pressure Injury Interventions:  Sensory Interventions: Assess changes in LOC, Keep linens dry and wrinkle-free, Minimize linen layers         Activity Interventions: Increase time out of bed, Pressure redistribution bed/mattress(bed type)         Nutrition Interventions: Offer support with meals,snacks and hydration    Friction and Shear Interventions: HOB 30 degrees or less                Problem: Patient Education: Go to Patient Education Activity  Goal: Patient/Family Education  Outcome: Progressing Towards Goal     Problem: Pain  Goal: *Control of Pain  Outcome: Progressing Towards Goal

## 2019-09-26 NOTE — PROGRESS NOTES
9/26/2019  1:38 PM  Pt Dc noted. No Dc service needs indicated. Pt will receive outpatient follow-up post DC. No additional DC service needs indicated.

## 2019-09-26 NOTE — PROGRESS NOTES
Family Practice Consult and Daily Progress Note: 9/26/2019  Mireya Davila, DO    Assessment/Plan:   Liver hemorrhage following liver biopsy, perihepatic hematoma - s/p gelfoam slurry embolization of the left hepatic artery 9/20/19 by IR    Acute blood loss anemia - transfuse as needed. Hb stable    HTN - still holding BP meds     Hemochromatosis - monitor    Fever- check BC and UA/UC       Problem List:  Problem List as of 9/26/2019 Date Reviewed: 9/20/2019          Codes Class Noted - Resolved    Acute liver failure without hepatic coma ICD-10-CM: K72.00  ICD-9-CM: 570  9/23/2019 - Present        * (Principal) Perihepatic hematoma ICD-10-CM: K76.89  ICD-9-CM: 573.8  9/20/2019 - Present        HTN (hypertension) ICD-10-CM: I10  ICD-9-CM: 401.9  9/20/2019 - Present        Hemochromatosis ICD-10-CM: E83.119  ICD-9-CM: 275.03  9/20/2019 - Present        Hemorrhage following liver biopsy ICD-10-CM: K91.840  ICD-9-CM: 998.11  9/20/2019 - Present            Subjective: We are asked to see this 35 y.o. male for his hypertension. He is a pt of Dr Maggy Parrish in our offices,  w/ history of HTN and hemochromatosis who presented to the ER on date of admission with c/o abdominal pain. He had undergone outpatient liver biopsy on 9/18 and developed abdominal pain following the procedure, was seen in the ER with negative findings on US 9/18 and released. On the day of admission labs revealed a decrease in hgb from 14 to 9 and CT shows large subcapsular hepatic hematoma with active extravasation. Currently he reports he reports \"just feel bad all over\" with no specific complaint. He is tachy and Hb is 7.2. For now will obviously hold BP meds. Also given his Hb has gone from 14ish to 7ish over 2 days, would consider transfusion of PRC. Will cont to follow. 9/22:  Last night had worsening ab pain but this morning feels better with much less ab pain.   Passed gas this AM.  Had 1 [facility] yesterday and Hb was up to 7.8 afterwards. Still tachy - BP holding up ok though. Hb down to 7.3 this AM - may need more blood. LFTs markedly elevated - watch closely. GI consulted. 9/23:  He is feeling better this AM.  Tolerating liquids po. LFTs improving. Hb 8.5 this AM - was 6.5 at 0100 and received 2 units PRCs. K+ low - replacing. Ca+ is ok when corrected for low albumin - no replacement needed but cont to monitor. Tmax last night 102. Tnow 98.2. Watching temp curve. Poss CTA of ab planned. Transfusing as needed. 9/24: Feeling better. Able to tolerate liquids. Hb stable today at 8.2. LFTs trending down. Tmax 100.5. Less abd pain. 9/25: Feeling some better. Having some c/o left sided abd pain. Had small BM yesterday am. Tmax 101 last night. No urinary symptoms. No nausea. 9/26:  He feels better. No ab pain today. No cough/sputum/UTI sx. There are a few basilar crackles on exam today so will ssend him to X-ray for a full CXR. Tmax 101.5. Tnow 98.7. Temps prob due to inflammation. Cultures neg so far. BP still on low side - continuing to hold BP med. For now hold his BP med (losartan) (if he goes home today) until seen by Dr Lida Reid next wk in follow up. Past Medical History:   Diagnosis Date    Hypertension      Past Surgical History:   Procedure Laterality Date    IR [facility] SI  9/20/2019     History reviewed. No pertinent family history.   Social History     Socioeconomic History    Marital status:      Spouse name: Not on file    Number of children: Not on file    Years of education: Not on file    Highest education level: Not on file   Occupational History    Not on file   Social Needs    Financial resource strain: Not on file    Food insecurity:     Worry: Not on file     Inability: Not on file    Transportation needs:     Medical: Not on file     Non-medical: Not on file   Tobacco Use    Smoking status: Never Smoker    Smokeless tobacco: Never Used   Substance and Sexual Activity    Alcohol use: Not Currently     Frequency: Never    Drug use: Not on file    Sexual activity: Not on file   Lifestyle    Physical activity:     Days per week: Not on file     Minutes per session: Not on file    Stress: Not on file   Relationships    Social connections:     Talks on phone: Not on file     Gets together: Not on file     Attends Gnosticism service: Not on file     Active member of club or organization: Not on file     Attends meetings of clubs or organizations: Not on file     Relationship status: Not on file    Intimate partner violence:     Fear of current or ex partner: Not on file     Emotionally abused: Not on file     Physically abused: Not on file     Forced sexual activity: Not on file   Other Topics Concern    Not on file   Social History Narrative    Not on file     No Known Allergies    Review of Systems:   A comprehensive review of systems was negative except for that written in the HPI. Objective:   Physical Exam:     Visit Vitals  /69 (BP 1 Location: Right arm, BP Patient Position: At rest)   Pulse 87   Temp 98.7 °F (37.1 °C)   Resp 20   Ht 5' 6\" (1.676 m)   Wt 86.2 kg (190 lb)   SpO2 96%   BMI 30.67 kg/m²      O2 Device: Room air  Temp (24hrs), Av.7 °F (37.6 °C), Min:98.6 °F (37 °C), Max:101.5 °F (38.6 °C)    No intake/output data recorded.  07 -  1900  In: 0982 [P.O.:840; I.V.:475]  Out: 800 [Urine:800]  General:  Alert, cooperative, no distress, appears stated age. Head:  Normocephalic, without obvious abnormality, atraumatic. Eyes:  Conjunctivae/corneas clear. PERRL, EOMs intact. Throat: Lips, mucosa, and tongue moist..   Neck: Supple, symmetrical, trachea midline, no adenopathy, thyroid: no enlargement/tenderness/nodules, no carotid bruit and no JVD. Lungs:   A few basilar crackles bilaterally. Chest wall:  No tenderness or deformity.    Heart:  Regular rate and rhythm, no murmur, click, rub or gallop. Abdomen:   No tenderness this AM; without rebound/guarding. BS present    Extremities: no cyanosis or edema. No calf tenderness or cords. Pulses: 2+ and symmetric all extremities. Skin: turgor normal. No rashes   Neurologic: CNII-XII intact. Alert and oriented X 3. Fine motor of hands and fingers normal.   equal.  No cogwheeling or rigidity. Gait not tested at this time. Sensation grossly normal to touch. Gross motor of extremities normal.       Data Review:       Recent Days:  Recent Labs     09/26/19 0308 09/25/19 0137 09/24/19 0515   WBC 11.6* 11.8* 11.3*   HGB 9.2* 8.7* 8.2*   HCT 27.4* 25.4* 23.8*    204 164     Recent Labs     09/26/19 0308 09/25/19 0137 09/24/19 0515 09/23/19  1617   * 135* 137 137   K 3.7 3.3* 3.4* 3.6    103 106 106   CO2 27 26 25 26   GLU 99 98 109* 115*   BUN 10 9 9 9   CREA 0.76 0.70 0.62* 0.67*   CA 7.8* 7.6* 7.5* 7.7*   PHOS  --  2.5*  --  0.8*   ALB 2.8* 2.7* 2.6*  --    TBILI 1.0 1.1* 1.3*  --    SGOT 134* 228* 433*  --    * 1,415* 1,833*  --      No results for input(s): PH, PCO2, PO2, HCO3, FIO2 in the last 72 hours.     24 Hour Results:  Recent Results (from the past 24 hour(s))   URINALYSIS W/MICROSCOPIC    Collection Time: 09/25/19  8:50 AM   Result Value Ref Range    Color DARK YELLOW      Appearance CLEAR CLEAR      Specific gravity 1.017 1.003 - 1.030      pH (UA) 6.5 5.0 - 8.0      Protein NEGATIVE  NEG mg/dL    Glucose NEGATIVE  NEG mg/dL    Ketone NEGATIVE  NEG mg/dL    Bilirubin NEGATIVE  NEG      Blood NEGATIVE  NEG      Urobilinogen 4.0 (H) 0.2 - 1.0 EU/dL    Nitrites NEGATIVE  NEG      Leukocyte Esterase TRACE (A) NEG      WBC 0-4 0 - 4 /hpf    RBC 0-5 0 - 5 /hpf    Epithelial cells FEW FEW /lpf    Bacteria NEGATIVE  NEG /hpf    Hyaline cast 0-2 0 - 5 /lpf   CULTURE, BLOOD    Collection Time: 09/25/19  9:35 AM   Result Value Ref Range    Special Requests: NO SPECIAL REQUESTS      Culture result: NO GROWTH AFTER 18 HOURS     CBC W/O DIFF    Collection Time: 09/26/19  3:08 AM   Result Value Ref Range    WBC 11.6 (H) 4.1 - 11.1 K/uL    RBC 3.02 (L) 4.10 - 5.70 M/uL    HGB 9.2 (L) 12.1 - 17.0 g/dL    HCT 27.4 (L) 36.6 - 50.3 %    MCV 90.7 80.0 - 99.0 FL    MCH 30.5 26.0 - 34.0 PG    MCHC 33.6 30.0 - 36.5 g/dL    RDW 15.2 (H) 11.5 - 14.5 %    PLATELET 137 314 - 715 K/uL    MPV 8.4 (L) 8.9 - 12.9 FL    NRBC 0.3 (H) 0  WBC    ABSOLUTE NRBC 0.03 (H) 0.00 - 4.28 K/uL   METABOLIC PANEL, COMPREHENSIVE    Collection Time: 09/26/19  3:08 AM   Result Value Ref Range    Sodium 134 (L) 136 - 145 mmol/L    Potassium 3.7 3.5 - 5.1 mmol/L    Chloride 103 97 - 108 mmol/L    CO2 27 21 - 32 mmol/L    Anion gap 4 (L) 5 - 15 mmol/L    Glucose 99 65 - 100 mg/dL    BUN 10 6 - 20 MG/DL    Creatinine 0.76 0.70 - 1.30 MG/DL    BUN/Creatinine ratio 13 12 - 20      GFR est AA >60 >60 ml/min/1.73m2    GFR est non-AA >60 >60 ml/min/1.73m2    Calcium 7.8 (L) 8.5 - 10.1 MG/DL    Bilirubin, total 1.0 0.2 - 1.0 MG/DL    ALT (SGPT) 996 (H) 12 - 78 U/L    AST (SGOT) 134 (H) 15 - 37 U/L    Alk.  phosphatase 203 (H) 45 - 117 U/L    Protein, total 6.2 (L) 6.4 - 8.2 g/dL    Albumin 2.8 (L) 3.5 - 5.0 g/dL    Globulin 3.4 2.0 - 4.0 g/dL    A-G Ratio 0.8 (L) 1.1 - 2.2         Medications reviewed  Current Facility-Administered Medications   Medication Dose Route Frequency    polyethylene glycol (MIRALAX) packet 17 g  17 g Oral DAILY    oxyCODONE IR (ROXICODONE) tablet 5 mg  5 mg Oral Q4H PRN    senna-docusate (PERICOLACE) 8.6-50 mg per tablet 1 Tab  1 Tab Oral DAILY    bisacodyl (DULCOLAX) suppository 10 mg  10 mg Rectal DAILY PRN    0.9% sodium chloride infusion 250 mL  250 mL IntraVENous PRN    0.9% sodium chloride infusion 250 mL  250 mL IntraVENous PRN    0.9% sodium chloride infusion 250 mL  250 mL IntraVENous PRN    hydrALAZINE (APRESOLINE) 20 mg/mL injection 20 mg  20 mg IntraVENous Q6H PRN    sodium chloride (NS) flush 5-40 mL  5-40 mL IntraVENous Q8H    sodium chloride (NS) flush 5-40 mL  5-40 mL IntraVENous PRN    HYDROmorphone (PF) (DILAUDID) injection 0.5 mg  0.5 mg IntraVENous Q4H PRN    naloxone (NARCAN) injection 0.4 mg  0.4 mg IntraVENous PRN    ondansetron (ZOFRAN) injection 4 mg  4 mg IntraVENous Q4H PRN    diphenhydrAMINE (BENADRYL) injection 12.5 mg  12.5 mg IntraVENous Q6H PRN     Care Plan discussed with: Patient and Nurse  Total time spent with patient: 30 minutes.   Malcolm Maldonado MD

## 2019-09-26 NOTE — DISCHARGE SUMMARY
Surgery Discharge Summary     Patient ID:  Milly Caro  [id number]  35 y.o.  1980    Admit date: 9/20/2019    Discharge date and time: 9/26/2019  2:14 PM     Admission Diagnoses:    Patient Active Problem List   Diagnosis Code    Perihepatic hematoma K76.89    HTN (hypertension) I10    Hemochromatosis E83.119    Hemorrhage following liver biopsy K91.840    Acute liver failure without hepatic coma K72.00       Discharge Diagnoses: There are no discharge diagnoses documented for the most recent discharge. Patient Active Problem List   Diagnosis Code    Perihepatic hematoma K76.89    HTN (hypertension) I10    Hemochromatosis E83.119    Hemorrhage following liver biopsy K91.840    Acute liver failure without hepatic coma K72.00       Procedures for this admission: IR mesenteric/hepatic arteriogram with embolization     Hospital Course: Mr. Rene Rivas presented to the ED on date of admission with c/o abdominal pain. He had undergone outpatient liver biopsy at [address] [place] [address] three days prior and developed abdominal pain soon after the procedure. He was seen at this ED on date of procedure and was discharged home after abdominal US. He continued to have pain and returned on 9/20 for re-evaluation. CT A/P showed large hepatic hematoma with active extravasation. IR was consulted and he underwent urgent angiography and embolization of the left hepatic artery. He was initially admitted to ICU for close monitoring and did require blood transfusion on 9/21 and 9/22. Hgb stabilized and there was no further evidence of bleeding. As expected, he had abdominal pain, tachycardia, fever, and marked transaminitis following embolization. These largely improved with supportive care. He did continue to have episodes of fever. CXR showed left lung base atelectasis with possible PNA. He was discharged home on Omnicef at the recommendation of family practice with instructions to follow up with his PCP.  He will also follow up with GI for results of liver biopsy. Surgical follow up is only as needed. Disposition: home    Discharged Condition : stable    Instructions: Follow-up with PCP and GI.              Signed:  TRACI Bello  9/26/2019  2:54 PM

## 2019-09-26 NOTE — PROGRESS NOTES
Received 2 scripts and a copy of discharge instructions which have all been read and explained to him and the female  at his side. Scripts included an antibiotic and Oxycodone IR.  Verbalized understanding

## 2019-10-01 LAB
BACTERIA SPEC CULT: NORMAL
SERVICE CMNT-IMP: NORMAL

## 2020-12-11 ENCOUNTER — APPOINTMENT (OUTPATIENT)
Dept: GENERAL RADIOLOGY | Age: 40
DRG: 871 | End: 2020-12-11
Attending: PHYSICIAN ASSISTANT
Payer: COMMERCIAL

## 2020-12-11 ENCOUNTER — HOSPITAL ENCOUNTER (INPATIENT)
Age: 40
LOS: 6 days | Discharge: HOME | DRG: 871 | End: 2020-12-17
Attending: EMERGENCY MEDICINE | Admitting: INTERNAL MEDICINE
Payer: COMMERCIAL

## 2020-12-11 DIAGNOSIS — U07.1 PNEUMONIA DUE TO COVID-19 VIRUS: ICD-10-CM

## 2020-12-11 DIAGNOSIS — J12.82 PNEUMONIA DUE TO COVID-19 VIRUS: ICD-10-CM

## 2020-12-11 DIAGNOSIS — J96.01 ACUTE RESPIRATORY FAILURE WITH HYPOXIA (HCC): Primary | ICD-10-CM

## 2020-12-11 PROBLEM — K76.89 PERIHEPATIC HEMATOMA: Status: RESOLVED | Noted: 2019-09-20 | Resolved: 2020-12-11

## 2020-12-11 PROBLEM — R50.9 FEVER: Status: ACTIVE | Noted: 2020-12-11

## 2020-12-11 PROBLEM — K91.840 HEMORRHAGE FOLLOWING LIVER BIOPSY: Status: RESOLVED | Noted: 2019-09-20 | Resolved: 2020-12-11

## 2020-12-11 PROBLEM — E66.9 OBESE: Status: ACTIVE | Noted: 2020-12-11

## 2020-12-11 PROBLEM — J12.81 SARS PNEUMONIA: Status: ACTIVE | Noted: 2020-12-11

## 2020-12-11 PROBLEM — K72.00 ACUTE LIVER FAILURE WITHOUT HEPATIC COMA: Status: RESOLVED | Noted: 2019-09-23 | Resolved: 2020-12-11

## 2020-12-11 PROBLEM — R06.82 TACHYPNEA: Status: ACTIVE | Noted: 2020-12-11

## 2020-12-11 PROBLEM — R00.0 SINUS TACHYCARDIA: Status: ACTIVE | Noted: 2020-12-11

## 2020-12-11 PROBLEM — A41.9 SEPSIS (HCC): Status: ACTIVE | Noted: 2020-12-11

## 2020-12-11 LAB
ABO + RH BLD: NORMAL
ALBUMIN SERPL-MCNC: 3.4 G/DL (ref 3.5–5)
ALBUMIN/GLOB SERPL: 0.9 {RATIO} (ref 1.1–2.2)
ALP SERPL-CCNC: 93 U/L (ref 45–117)
ALT SERPL-CCNC: 73 U/L (ref 12–78)
ANION GAP SERPL CALC-SCNC: 6 MMOL/L (ref 5–15)
ARTERIAL PATENCY WRIST A: YES
AST SERPL-CCNC: 53 U/L (ref 15–37)
BASE DEFICIT BLDA-SCNC: 1.5 MMOL/L
BASOPHILS # BLD: 0 K/UL (ref 0–0.1)
BASOPHILS NFR BLD: 0 % (ref 0–1)
BDY SITE: ABNORMAL
BILIRUB SERPL-MCNC: 0.6 MG/DL (ref 0.2–1)
BLOOD GROUP ANTIBODIES SERPL: NORMAL
BNP SERPL-MCNC: 14 PG/ML
BUN SERPL-MCNC: 13 MG/DL (ref 6–20)
BUN/CREAT SERPL: 13 (ref 12–20)
CALCIUM SERPL-MCNC: 8.1 MG/DL (ref 8.5–10.1)
CHLORIDE SERPL-SCNC: 105 MMOL/L (ref 97–108)
CO2 SERPL-SCNC: 24 MMOL/L (ref 21–32)
CREAT SERPL-MCNC: 1.03 MG/DL (ref 0.7–1.3)
CRP SERPL-MCNC: 6.79 MG/DL (ref 0–0.6)
D DIMER PPP FEU-MCNC: 0.47 MG/L FEU (ref 0–0.65)
DIFFERENTIAL METHOD BLD: ABNORMAL
EOSINOPHIL # BLD: 0 K/UL (ref 0–0.4)
EOSINOPHIL NFR BLD: 0 % (ref 0–7)
ERYTHROCYTE [DISTWIDTH] IN BLOOD BY AUTOMATED COUNT: 12.4 % (ref 11.5–14.5)
FERRITIN SERPL-MCNC: 1400 NG/ML (ref 26–388)
FIBRINOGEN PPP-MCNC: 506 MG/DL (ref 200–475)
GLOBULIN SER CALC-MCNC: 3.8 G/DL (ref 2–4)
GLUCOSE SERPL-MCNC: 105 MG/DL (ref 65–100)
HCO3 BLDA-SCNC: 21 MMOL/L (ref 22–26)
HCT VFR BLD AUTO: 39.5 % (ref 36.6–50.3)
HGB BLD-MCNC: 13.8 G/DL (ref 12.1–17)
IMM GRANULOCYTES # BLD AUTO: 0 K/UL
IMM GRANULOCYTES NFR BLD AUTO: 0 %
LACTATE BLD-SCNC: 0.83 MMOL/L (ref 0.4–2)
LDH SERPL L TO P-CCNC: 404 U/L (ref 85–241)
LYMPHOCYTES # BLD: 0.8 K/UL (ref 0.8–3.5)
LYMPHOCYTES NFR BLD: 16 % (ref 12–49)
MCH RBC QN AUTO: 31.4 PG (ref 26–34)
MCHC RBC AUTO-ENTMCNC: 34.9 G/DL (ref 30–36.5)
MCV RBC AUTO: 90 FL (ref 80–99)
MONOCYTES # BLD: 0.4 K/UL (ref 0–1)
MONOCYTES NFR BLD: 8 % (ref 5–13)
NEUTS BAND NFR BLD MANUAL: 14 % (ref 0–6)
NEUTS SEG # BLD: 4.1 K/UL (ref 1.8–8)
NEUTS SEG NFR BLD: 62 % (ref 32–75)
NRBC # BLD: 0 K/UL (ref 0–0.01)
NRBC BLD-RTO: 0 PER 100 WBC
PCO2 BLDA: 31 MMHG (ref 35–45)
PH BLDA: 7.45 [PH] (ref 7.35–7.45)
PLATELET # BLD AUTO: 227 K/UL (ref 150–400)
PMV BLD AUTO: 8.4 FL (ref 8.9–12.9)
PO2 BLDA: 58 MMHG (ref 80–100)
POTASSIUM SERPL-SCNC: 3.5 MMOL/L (ref 3.5–5.1)
PROCALCITONIN SERPL-MCNC: 0.06 NG/ML
PROT SERPL-MCNC: 7.2 G/DL (ref 6.4–8.2)
RBC # BLD AUTO: 4.39 M/UL (ref 4.1–5.7)
RBC MORPH BLD: ABNORMAL
SAO2 % BLD: 92 % (ref 92–97)
SAO2% DEVICE SAO2% SENSOR NAME: ABNORMAL
SODIUM SERPL-SCNC: 135 MMOL/L (ref 136–145)
SPECIMEN EXP DATE BLD: NORMAL
SPECIMEN SITE: ABNORMAL
TROPONIN I SERPL-MCNC: <0.05 NG/ML
WBC # BLD AUTO: 5.3 K/UL (ref 4.1–11.1)
WBC MORPH BLD: ABNORMAL

## 2020-12-11 PROCEDURE — 80053 COMPREHEN METABOLIC PANEL: CPT

## 2020-12-11 PROCEDURE — 85384 FIBRINOGEN ACTIVITY: CPT

## 2020-12-11 PROCEDURE — 74011250636 HC RX REV CODE- 250/636: Performed by: INTERNAL MEDICINE

## 2020-12-11 PROCEDURE — 87077 CULTURE AEROBIC IDENTIFY: CPT

## 2020-12-11 PROCEDURE — 65660000000 HC RM CCU STEPDOWN

## 2020-12-11 PROCEDURE — 86140 C-REACTIVE PROTEIN: CPT

## 2020-12-11 PROCEDURE — 82803 BLOOD GASES ANY COMBINATION: CPT

## 2020-12-11 PROCEDURE — 96374 THER/PROPH/DIAG INJ IV PUSH: CPT

## 2020-12-11 PROCEDURE — 74011000258 HC RX REV CODE- 258: Performed by: INTERNAL MEDICINE

## 2020-12-11 PROCEDURE — XW033E5 INTRODUCTION OF REMDESIVIR ANTI-INFECTIVE INTO PERIPHERAL VEIN, PERCUTANEOUS APPROACH, NEW TECHNOLOGY GROUP 5: ICD-10-PCS | Performed by: INTERNAL MEDICINE

## 2020-12-11 PROCEDURE — 84145 PROCALCITONIN (PCT): CPT

## 2020-12-11 PROCEDURE — 83605 ASSAY OF LACTIC ACID: CPT

## 2020-12-11 PROCEDURE — 96375 TX/PRO/DX INJ NEW DRUG ADDON: CPT

## 2020-12-11 PROCEDURE — 65270000029 HC RM PRIVATE

## 2020-12-11 PROCEDURE — 99285 EMERGENCY DEPT VISIT HI MDM: CPT

## 2020-12-11 PROCEDURE — 82728 ASSAY OF FERRITIN: CPT

## 2020-12-11 PROCEDURE — 84484 ASSAY OF TROPONIN QUANT: CPT

## 2020-12-11 PROCEDURE — 74011250637 HC RX REV CODE- 250/637: Performed by: INTERNAL MEDICINE

## 2020-12-11 PROCEDURE — 87040 BLOOD CULTURE FOR BACTERIA: CPT

## 2020-12-11 PROCEDURE — 74011000250 HC RX REV CODE- 250: Performed by: PHYSICIAN ASSISTANT

## 2020-12-11 PROCEDURE — 36415 COLL VENOUS BLD VENIPUNCTURE: CPT

## 2020-12-11 PROCEDURE — 36600 WITHDRAWAL OF ARTERIAL BLOOD: CPT

## 2020-12-11 PROCEDURE — 74011000250 HC RX REV CODE- 250: Performed by: INTERNAL MEDICINE

## 2020-12-11 PROCEDURE — 93005 ELECTROCARDIOGRAM TRACING: CPT

## 2020-12-11 PROCEDURE — 85025 COMPLETE CBC W/AUTO DIFF WBC: CPT

## 2020-12-11 PROCEDURE — 85379 FIBRIN DEGRADATION QUANT: CPT

## 2020-12-11 PROCEDURE — 83615 LACTATE (LD) (LDH) ENZYME: CPT

## 2020-12-11 PROCEDURE — 74011250636 HC RX REV CODE- 250/636: Performed by: PHYSICIAN ASSISTANT

## 2020-12-11 PROCEDURE — 83880 ASSAY OF NATRIURETIC PEPTIDE: CPT

## 2020-12-11 PROCEDURE — 86900 BLOOD TYPING SEROLOGIC ABO: CPT

## 2020-12-11 PROCEDURE — 71045 X-RAY EXAM CHEST 1 VIEW: CPT

## 2020-12-11 PROCEDURE — 87186 SC STD MICRODIL/AGAR DIL: CPT

## 2020-12-11 RX ORDER — ZINC SULFATE 50(220)MG
1 CAPSULE ORAL DAILY
Status: DISCONTINUED | OUTPATIENT
Start: 2020-12-11 | End: 2020-12-17 | Stop reason: HOSPADM

## 2020-12-11 RX ORDER — ASCORBIC ACID 500 MG
500 TABLET ORAL DAILY
Status: DISCONTINUED | OUTPATIENT
Start: 2020-12-11 | End: 2020-12-17 | Stop reason: HOSPADM

## 2020-12-11 RX ORDER — SODIUM CHLORIDE 0.9 % (FLUSH) 0.9 %
5-10 SYRINGE (ML) INJECTION AS NEEDED
Status: DISCONTINUED | OUTPATIENT
Start: 2020-12-11 | End: 2020-12-17 | Stop reason: HOSPADM

## 2020-12-11 RX ORDER — CEFDINIR 300 MG/1
300 CAPSULE ORAL 2 TIMES DAILY
COMMUNITY
Start: 2020-12-07 | End: 2020-12-17

## 2020-12-11 RX ORDER — CYANOCOBALAMIN (VITAMIN B-12) 500 MCG
400 TABLET ORAL DAILY
Status: DISCONTINUED | OUTPATIENT
Start: 2020-12-11 | End: 2020-12-17 | Stop reason: HOSPADM

## 2020-12-11 RX ORDER — POLYETHYLENE GLYCOL 3350 17 G/17G
17 POWDER, FOR SOLUTION ORAL DAILY PRN
Status: DISCONTINUED | OUTPATIENT
Start: 2020-12-11 | End: 2020-12-17 | Stop reason: HOSPADM

## 2020-12-11 RX ORDER — DEXAMETHASONE SODIUM PHOSPHATE 10 MG/ML
10 INJECTION INTRAMUSCULAR; INTRAVENOUS
Status: COMPLETED | OUTPATIENT
Start: 2020-12-11 | End: 2020-12-11

## 2020-12-11 RX ORDER — AZITHROMYCIN 250 MG/1
500 TABLET, FILM COATED ORAL DAILY
Status: COMPLETED | OUTPATIENT
Start: 2020-12-12 | End: 2020-12-16

## 2020-12-11 RX ORDER — DEXAMETHASONE 6 MG/1
6 TABLET ORAL DAILY
Status: DISCONTINUED | OUTPATIENT
Start: 2020-12-12 | End: 2020-12-17 | Stop reason: HOSPADM

## 2020-12-11 RX ORDER — ONDANSETRON 4 MG/1
4 TABLET, ORALLY DISINTEGRATING ORAL
Status: DISCONTINUED | OUTPATIENT
Start: 2020-12-11 | End: 2020-12-17 | Stop reason: HOSPADM

## 2020-12-11 RX ORDER — DEXTROSE, SODIUM CHLORIDE, AND POTASSIUM CHLORIDE 5; .45; .15 G/100ML; G/100ML; G/100ML
75 INJECTION INTRAVENOUS CONTINUOUS
Status: DISCONTINUED | OUTPATIENT
Start: 2020-12-11 | End: 2020-12-13

## 2020-12-11 RX ORDER — ENOXAPARIN SODIUM 100 MG/ML
40 INJECTION SUBCUTANEOUS DAILY
Status: DISCONTINUED | OUTPATIENT
Start: 2020-12-12 | End: 2020-12-17 | Stop reason: HOSPADM

## 2020-12-11 RX ORDER — FAMOTIDINE 20 MG/1
20 TABLET, FILM COATED ORAL 2 TIMES DAILY
Status: DISCONTINUED | OUTPATIENT
Start: 2020-12-12 | End: 2020-12-17 | Stop reason: HOSPADM

## 2020-12-11 RX ORDER — ACETAMINOPHEN 650 MG/1
650 SUPPOSITORY RECTAL
Status: DISCONTINUED | OUTPATIENT
Start: 2020-12-11 | End: 2020-12-17 | Stop reason: HOSPADM

## 2020-12-11 RX ORDER — SULFAMETHOXAZOLE AND TRIMETHOPRIM 800; 160 MG/1; MG/1
1 TABLET ORAL 2 TIMES DAILY
COMMUNITY
Start: 2020-12-09 | End: 2020-12-17

## 2020-12-11 RX ORDER — ACETAMINOPHEN 325 MG/1
650 TABLET ORAL
Status: DISCONTINUED | OUTPATIENT
Start: 2020-12-11 | End: 2020-12-17 | Stop reason: HOSPADM

## 2020-12-11 RX ORDER — ONDANSETRON 2 MG/ML
4 INJECTION INTRAMUSCULAR; INTRAVENOUS
Status: DISCONTINUED | OUTPATIENT
Start: 2020-12-11 | End: 2020-12-17 | Stop reason: HOSPADM

## 2020-12-11 RX ORDER — AZITHROMYCIN 250 MG/1
250 TABLET, FILM COATED ORAL SEE ADMIN INSTRUCTIONS
COMMUNITY
Start: 2020-12-10 | End: 2020-12-17

## 2020-12-11 RX ORDER — KETOROLAC TROMETHAMINE 30 MG/ML
15 INJECTION, SOLUTION INTRAMUSCULAR; INTRAVENOUS
Status: COMPLETED | OUTPATIENT
Start: 2020-12-11 | End: 2020-12-11

## 2020-12-11 RX ADMIN — KETOROLAC TROMETHAMINE 15 MG: 30 INJECTION, SOLUTION INTRAMUSCULAR at 15:22

## 2020-12-11 RX ADMIN — CEFTRIAXONE 2 G: 2 INJECTION, POWDER, FOR SOLUTION INTRAMUSCULAR; INTRAVENOUS at 18:25

## 2020-12-11 RX ADMIN — REMDESIVIR 200 MG: 100 INJECTION, POWDER, LYOPHILIZED, FOR SOLUTION INTRAVENOUS at 18:20

## 2020-12-11 RX ADMIN — CHOLECALCIFEROL (VITAMIN D3) 10 MCG (400 UNIT) TABLET 1 TABLET: at 18:19

## 2020-12-11 RX ADMIN — DEXAMETHASONE SODIUM PHOSPHATE 10 MG: 10 INJECTION, SOLUTION INTRAMUSCULAR; INTRAVENOUS at 15:20

## 2020-12-11 RX ADMIN — POTASSIUM CHLORIDE, DEXTROSE MONOHYDRATE AND SODIUM CHLORIDE 75 ML/HR: 150; 5; 450 INJECTION, SOLUTION INTRAVENOUS at 23:44

## 2020-12-11 RX ADMIN — AZITHROMYCIN MONOHYDRATE 500 MG: 500 INJECTION, POWDER, LYOPHILIZED, FOR SOLUTION INTRAVENOUS at 15:23

## 2020-12-11 RX ADMIN — SODIUM CHLORIDE 1000 ML: 900 INJECTION, SOLUTION INTRAVENOUS at 14:54

## 2020-12-11 RX ADMIN — OXYCODONE HYDROCHLORIDE AND ACETAMINOPHEN 500 MG: 500 TABLET ORAL at 17:11

## 2020-12-11 RX ADMIN — AZITHROMYCIN MONOHYDRATE 500 MG: 500 INJECTION, POWDER, LYOPHILIZED, FOR SOLUTION INTRAVENOUS at 18:31

## 2020-12-11 RX ADMIN — ZINC SULFATE 220 MG (50 MG) CAPSULE 1 CAPSULE: CAPSULE at 17:11

## 2020-12-11 RX ADMIN — CEFTRIAXONE 2 G: 2 INJECTION, POWDER, FOR SOLUTION INTRAMUSCULAR; INTRAVENOUS at 15:19

## 2020-12-11 RX ADMIN — SODIUM CHLORIDE 1000 ML: 900 INJECTION, SOLUTION INTRAVENOUS at 17:10

## 2020-12-11 NOTE — PROGRESS NOTES
[facility]: MED RECONCILIATION    Comments/Recommendations:     Patient unable to confirm name, , allergies and preferred pharmacy  Rx query used to complete med rec  Multiple Antibiotics prescribed recently, see below          Information obtained from: RX Query/Pharmacy records    Allergies: Patient has no known allergies. Prior to Admission Medications:     Prior to Admission Medications   Prescriptions Last Dose Informant Patient Reported? Taking?   acetaminophen (TYLENOL) 500 mg tablet  Other Yes No   Sig: Take 500 mg by mouth every six (6) hours as needed for Pain. azithromycin (ZITHROMAX) 250 mg tablet  Other Yes Yes   Sig: Take 250 mg by mouth See Admin Instructions. Tk 2 tabs on day 1 then 1 tab daily for 5 days   cefdinir (OMNICEF) 300 mg capsule  Other Yes Yes   Sig: Take 300 mg by mouth two (2) times a day. X 10 days   trimethoprim-sulfamethoxazole (Bactrim DS) 160-800 mg per tablet  Other Yes Yes   Sig: Take 1 Tab by mouth two (2) times a day. X 14 days      Facility-Administered Medications: None           [employer]. TIESHA    Clinical Staff Pharmacist  [address] [place] [address]  [phone]

## 2020-12-11 NOTE — PROGRESS NOTES
12/11/2020  Case Management Note    4:19 PM  Attempted again to reach patient for initial assessment, no answer. NISA Villar    4:03 PM  Noted that patient is being admitted; attempted to call into his room for initial assessment and ACP discussion, patient did not answer. Will try again in 10-15 mins.      Osvaldo Anne BS

## 2020-12-11 NOTE — CONSULTS
Name: Stephon 50: Bree   : 1980 Admit Date: 2020   Phone: [phone]  Room: 06/   PCP: Carlincisco Esparza  MRN: [id number]   Date: 2020  Code: Prior          Chart and notes reviewed. Data reviewed. I review the patient's current medications in the medical record at each encounter. I have evaluated and examined the patient. HPI:    5:06 PM       History was obtained from patient. I was asked by Josué Lilly MD to see Hector Morris in consultation for a chief complaint of COVID and acute respiratory failure with hypoxia. History of Present Illness:  Mr. Cristine Greenwood is a 39 yo gentleman with a history of HTN who is admitted with acute respiratory failure with hypoxia and COVID-19. He has been feeling poorly for about 10 days and was diagnosed with COVID. He comes in with progressive shortness of breath, cough, fevers/chills, and general malaise/fatigue. Note to be hypoxic on RA and satting well on 2L NC. Had attended a Thanksgiving dinner and multiple attendees have now been diagnosed with COVID. Labs: 5.3, cr 1.03, crp 6.79, AST 53, ALT 73, d-dimer 0.47, ABG on RA 7.45    Images:  CXR with patchy bilateral infiltrates      Past Medical History:   Diagnosis Date    Hypertension     Obese        Past Surgical History:   Procedure Laterality Date    IR [facility] W SI  2019       No family history on file.     Social History     Tobacco Use    Smoking status: Never Smoker    Smokeless tobacco: Never Used   Substance Use Topics    Alcohol use: Not Currently     Frequency: Never       No Known Allergies    Current Facility-Administered Medications   Medication Dose Route Frequency    sodium chloride (NS) flush 5-10 mL  5-10 mL IntraVENous PRN    sodium chloride 0.9 % bolus infusion 1,000 mL  1,000 mL IntraVENous ONCE    ascorbic acid (vitamin C) (VITAMIN C) tablet 500 mg  500 mg Oral DAILY    zinc sulfate (ZINCATE) 220 (50) mg capsule 1 Cap  1 Cap Oral DAILY    cholecalciferol (VITAMIN D3) (400 Units /10 mcg) tablet 1 Tab  400 Units Oral DAILY    [START ON 12/12/2020] dexAMETHasone (DECADRON) tablet 6 mg  6 mg Oral DAILY    ipratropium-albuterol (COMBIVENT RESPIMAT) 20 mcg-100 mcg inhalation spray  1 Puff Inhalation Q4H PRN     Current Outpatient Medications   Medication Sig    cefdinir (OMNICEF) 300 mg capsule Take 1 Cap by mouth two (2) times a day.  acetaminophen (TYLENOL) 500 mg tablet Take 500 mg by mouth every six (6) hours as needed for Pain. REVIEW OF SYSTEMS   12 point ROS negative except as stated in the HPI. Physical Exam:   Visit Vitals  /70   Pulse (!) 103   Temp (!) 102.7 °F (39.3 °C)   Resp 25   Ht 5' 6\" (1.676 m)   Wt 89.8 kg (198 lb)   SpO2 96%   BMI 31.96 kg/m²       General:  Alert, cooperative, no distress, appears stated age. Head:  Normocephalic, without obvious abnormality, atraumatic. Eyes:  Conjunctivae/corneas clear. Neck: Supple, symmetrical, trachea midline,   Lungs:   Clear to auscultation bilaterally. Chest wall:  No tenderness or deformity. Heart:  Regular rate and rhythm, S1, S2 normal, no murmur, click, rub or gallop. Abdomen:   Soft, non-tender. Bowel sounds normal.   Extremities: Extremities normal, atraumatic, no cyanosis or edema. Pulses: 2+ and symmetric all extremities.    Skin: Skin color, texture, turgor normal. No rashes or lesions       Neurologic: Grossly nonfocal       Lab Results   Component Value Date/Time    Sodium 135 (L) 12/11/2020 02:34 PM    Potassium 3.5 12/11/2020 02:34 PM    Chloride 105 12/11/2020 02:34 PM    CO2 24 12/11/2020 02:34 PM    BUN 13 12/11/2020 02:34 PM    Creatinine 1.03 12/11/2020 02:34 PM    Glucose 105 (H) 12/11/2020 02:34 PM    Calcium 8.1 (L) 12/11/2020 02:34 PM    Magnesium 1.9 09/23/2019 01:54 AM    Phosphorus 2.5 (L) 09/25/2019 01:37 AM       Lab Results   Component Value Date/Time    WBC 5.3 12/11/2020 02:34 PM    HGB 13.8 12/11/2020 02:34 PM    PLATELET 148 00/18/9841 02:34 PM    MCV 90.0 12/11/2020 02:34 PM       Lab Results   Component Value Date/Time    INR 1.2 (H) 09/23/2019 01:54 AM    aPTT 22.2 09/23/2019 01:54 AM    Alk. phosphatase 93 12/11/2020 02:34 PM    Protein, total 7.2 12/11/2020 02:34 PM    Albumin 3.4 (L) 12/11/2020 02:34 PM    Globulin 3.8 12/11/2020 02:34 PM       No results found for: IRON, FE, TIBC, IBCT, PSAT, FERR    Lab Results   Component Value Date/Time    C-Reactive protein 6.79 (H) 12/11/2020 02:34 PM        Lab Results   Component Value Date/Time    PH 7.45 12/11/2020 04:35 PM    PCO2 31 (L) 12/11/2020 04:35 PM    PO2 58 (L) 12/11/2020 04:35 PM    HCO3 21 (L) 12/11/2020 04:35 PM       Lab Results   Component Value Date/Time    Troponin-I, Qt. <0.05 12/11/2020 02:34 PM        Lab Results   Component Value Date/Time    Culture result: NO GROWTH 6 DAYS 09/25/2019 09:35 AM    Culture result: NO GROWTH 1 DAY 09/25/2019 08:50 AM    Culture result: MRSA NOT PRESENT 09/20/2019 06:20 PM    Culture result:  09/20/2019 06:20 PM         Screening of patient nares for MRSA is for surveillance purposes and, if positive, to facilitate isolation considerations in high risk settings. It is not intended for automatic decolonization interventions per se as regimens are not sufficiently effective to warrant routine use.        No results found for: TOXA1, RPR, HBCM, HBSAG, HAAB, HCAB1, HAAT, G6PD, CRYAC, HIVGT, HIVR, HIV1, HIV12, HIVPC, HIVRPI    No results found for: VANCT, CPK    Lab Results   Component Value Date/Time    Color DARK YELLOW 09/25/2019 08:50 AM    Appearance CLEAR 09/25/2019 08:50 AM    pH (UA) 6.5 09/25/2019 08:50 AM    Protein NEGATIVE  09/25/2019 08:50 AM    Glucose NEGATIVE  09/25/2019 08:50 AM    Ketone NEGATIVE  09/25/2019 08:50 AM    Bilirubin NEGATIVE  09/25/2019 08:50 AM    Blood NEGATIVE  09/25/2019 08:50 AM    Urobilinogen 4.0 (H) 09/25/2019 08:50 AM    Nitrites NEGATIVE  09/25/2019 08:50 AM    Leukocyte Esterase TRACE (A) 09/25/2019 08:50 AM    WBC 0-4 09/25/2019 08:50 AM    RBC 0-5 09/25/2019 08:50 AM    Bacteria NEGATIVE  09/25/2019 08:50 AM       IMPRESSION  · COVID-19  · PNA  · Acute respiratory failure with hypoxia    PLAN  · Goal sats 88% or higher, wean O2 as tolerated  · Decadron  · Vitamin C, zinc  · Ceftriaxone, azithromycin  · Will begin remdesivir  · Lovenox  · Trend d-dimer, inflammatory markers, renal and hepatic function      Thank you for allowing us to participate in the care of this patient. We will be happy to follow along in his/her progress with you.     Td Ho MD

## 2020-12-11 NOTE — H&P
[facility]    Hospitalist Admission Note      NAME:  Kandy Nicholas   :   1980   MRN:  [id number]     PCP:  Wu Hui DO     Date/Time of service:  2020 4:01 PM          Subjective:     CHIEF COMPLAINT: dyspnea     HISTORY OF PRESENT ILLNESS:     Mr. Morris Bauer is a 38 y.o.  male who presented to the Emergency Department complaining of dyspnea. Fever started 2 weeks ago. Fatigue and dyspnea worsening over days. Associated with fever and cough. Dx yesterday of COVID by outside testing, had tested negative 1 week earlier. Had been to a [employer] gathering with now multiple COVID positive guests. ER finds sepsis criteria and hypoxia. We will admit him for management. Past Medical History:   Diagnosis Date    Hypertension     Obese         Past Surgical History:   Procedure Laterality Date    IR [facility] SI  2019       Social History     Tobacco Use    Smoking status: Never Smoker    Smokeless tobacco: Never Used   Substance Use Topics    Alcohol use: Not Currently     Frequency: Never        No family history on file. Family hx cannot be fully assessed, since the patient cannot provide information    No Known Allergies     Prior to Admission medications    Medication Sig Start Date End Date Taking? Authorizing Provider   cefdinir (OMNICEF) 300 mg capsule Take 1 Cap by mouth two (2) times a day. 19   TRACI Rodriguez   acetaminophen (TYLENOL) 500 mg tablet Take 500 mg by mouth every six (6) hours as needed for Pain.     Provider, Historical       Review of Systems:  (bold if positive, if negative)    Gen:   fever, chills, fatigueEyes:  ENT:  CVS:  Pulm: GI:  :  MS:  Skin:  Psych:  Endo:  Hem:  Renal:  Neuro:  weakness      Objective:      VITALS:    Vital signs reviewed; most recent are:    Visit Vitals  /73   Pulse (!) 108   Temp (!) 102.7 °F (39.3 °C)   Resp 28   Ht 5' 6\" (1.676 m)   Wt 89.8 kg (198 lb)   SpO2 95% BMI 31.96 kg/m²     SpO2 Readings from Last 6 Encounters:   12/11/20 95%   09/26/19 95%   09/18/19 97%        No intake or output data in the 24 hours ending 12/11/20 1601     Exam:     Physical Exam:    Gen:  Obese, in no acute distress  HEENT:  Pink conjunctivae, PERRL, hearing intact to voice, moist mucous membranes  Neck:  Supple, without masses, thyroid non-tender  Resp:  No accessory muscle use, bilateral coarse breath sounds  Card:  No murmurs, tachycardic S1, S2 without thrills, bruits or peripheral edema  Abd:  Soft, non-tender, non-distended, normoactive bowel sounds are present, no mass  Lymph:  No cervical or inguinal adenopathy  Musc:  No cyanosis or clubbing  Skin:  No rashes or ulcers, skin turgor is good  Neuro:  Cranial nerves are grossly intact, mild motor weakness, follows commands appropriately  Psych:  Good insight, oriented to person, place and time, alert     Labs:    Recent Labs     12/11/20  1434   WBC 5.3   HGB 13.8   HCT 39.5        Recent Labs     12/11/20  1434   *   K 3.5      CO2 24   *   BUN 13   CREA 1.03   CA 8.1*   ALB 3.4*   TBILI 0.6   ALT 73     No results found for: GLUCPOC  No results for input(s): PH, PCO2, PO2, HCO3, FIO2 in the last 72 hours. No results for input(s): INR, INREXT in the last 72 hours. All Micro Results     Procedure Component Value Units Date/Time    CULTURE, BLOOD, PAIRED [[id number]] Collected:  12/11/20 1446    Order Status:  Completed Specimen:  Blood Updated:  12/11/20 1502    URINE CULTURE HOLD SAMPLE [[id number]]     Order Status:  Sent Specimen:  Urine           I have reviewed previous records       Assessment and Plan:      SARS pneumonia - POA, based on testing outside, and symptoms here. Admit to remote tele. Droplet precautions. Decadron, vit C, Zinc, Vit D, lovenox. Consult pulmonary, who can decide on remdesivir and convalescent plasma. I note that WHO studies have found zero benefit from remdesivir.     Sepsis / Fever / Sinus tachycardia - POA due to SARS PNA. No elevation in WBC. Inflammatory markers low end. Monitor those. Supportive care. IVF. Monitor cx. Empirc Azithromycin though no sign of bacterial infection. Acute hypoxemic respiratory failure / Tachypnea - POA due to SARS PNA. Check room air ABG. Continue oxygen as needed. DDimer low, and so I have low suspicion of significant PE.    HTN (hypertension) - POA, not on meds. Monitor with hydration and sepsis. Hemochromatosis - Per chart. No symptoms     Telemetry reviewed:   normal sinus rhythm    Risk of deterioration: high      Total time spent with patient: 46 Minutes I personally reviewed chart, notes, data and current medications in the medical record. I have personally examined and treated the patient at bedside during this period.  Signed out to Dr Christine East at [facility] discussed with: Patient, Nursing Staff and >50% of time spent in counseling and coordination of care    Discussed:  Care Plan       ___________________________________________________    Attending Physician: Gino Edmond MD

## 2020-12-11 NOTE — ED PROVIDER NOTES
Patient is a 65-year-old male with past medical history of hypertension on no medications who presents ambulatory complaining of worsening shortness of breath and [facility]  He states his symptoms began 10 days ago with mild URI symptoms that progressively worsened the past few days. [facility] at [facility] yesterday and rapid Covid was positive, he was negative for influenza A&B, he had a chest x-ray done but does not know the result and was started on azithromycin. He states his breathing significantly worsened overnight. He states he took Tylenol 2 hours prior to arrival.  He denies vomiting, diarrhea, abdominal pain. Notes chest pressure. Past Medical History:   Diagnosis Date    Hypertension        Past Surgical History:   Procedure Laterality Date    IR [facility] W SI  9/20/2019         No family history on file.     Social History     Socioeconomic History    Marital status:      Spouse name: Not on file    Number of children: Not on file    Years of education: Not on file    Highest education level: Not on file   Occupational History    Not on file   Social Needs    Financial resource strain: Not on file    Food insecurity     Worry: Not on file     Inability: Not on file    Transportation needs     Medical: Not on file     Non-medical: Not on file   Tobacco Use    Smoking status: Never Smoker    Smokeless tobacco: Never Used   Substance and Sexual Activity    Alcohol use: Not Currently     Frequency: Never    Drug use: Not on file    Sexual activity: Not on file   Lifestyle    Physical activity     Days per week: Not on file     Minutes per session: Not on file    Stress: Not on file   Relationships    Social connections     Talks on phone: Not on file     Gets together: Not on file     Attends Congregation service: Not on file     Active member of club or organization: Not on file     Attends meetings of clubs or organizations: Not on file     Relationship status: Not on file    Intimate partner violence     Fear of current or ex partner: Not on file     Emotionally abused: Not on file     Physically abused: Not on file     Forced sexual activity: Not on file   Other Topics Concern    Not on file   Social History Narrative    Not on file         ALLERGIES: Patient has no known allergies. Review of Systems   Constitutional: Positive for chills and fever. Negative for activity change, fatigue and unexpected weight change. HENT: Positive for rhinorrhea. Negative for trouble swallowing. Respiratory: Positive for cough, chest tightness and shortness of breath. Negative for wheezing. Cardiovascular: Positive for chest pain. Negative for palpitations. Gastrointestinal: Negative. Negative for abdominal pain, diarrhea, nausea and vomiting. Genitourinary: Negative. Negative for dysuria, flank pain, frequency and hematuria. Musculoskeletal: Negative. Negative for arthralgias, back pain, neck pain and neck stiffness. Skin: Negative. Negative for color change and rash. Neurological: Negative. Negative for dizziness, numbness and headaches. All other systems reviewed and are negative. Vitals:    12/11/20 1347 12/11/20 1424 12/11/20 1425 12/11/20 1444   BP: (!) 149/93  134/84    Pulse: (!) 125  (!) 125 (!) 112   Resp: 24  29 (!) 31   Temp: (!) 102.7 °F (39.3 °C)      SpO2: 94% (!) 89% 90% 95%   Weight: 89.8 kg (198 lb)      Height: 5' 6\" (1.676 m)               Physical Exam  Vitals signs and nursing note reviewed. Constitutional:       General: He is not in acute distress. Appearance: He is well-developed. He is not toxic-appearing or diaphoretic. HENT:      Head: Normocephalic and atraumatic. Eyes:      General:         Right eye: No discharge. Left eye: No discharge.       Conjunctiva/sclera: Conjunctivae normal.   Neck:      Musculoskeletal: Full passive range of motion without pain and normal range of motion. Trachea: No tracheal tenderness. Cardiovascular:      Rate and Rhythm: Regular rhythm. Tachycardia present. Pulses: Normal pulses. Heart sounds: Normal heart sounds. No murmur. No friction rub. No gallop. Pulmonary:      Effort: Respiratory distress present. Breath sounds: Normal breath sounds. No wheezing or rhonchi. Comments: Crackles to LLL & RLL; tachypneic  Chest:      Chest wall: No tenderness. Abdominal:      General: Bowel sounds are normal. There is no distension. Palpations: Abdomen is soft. Tenderness: There is no abdominal tenderness. There is no guarding or rebound. Musculoskeletal: Normal range of motion. General: No tenderness. Skin:     General: Skin is warm and dry. Capillary Refill: Capillary refill takes less than 2 seconds. Findings: No abrasion, erythema or rash. Neurological:      Mental Status: He is alert and oriented to person, place, and time. Cranial Nerves: No cranial nerve deficit. Sensory: No sensory deficit. Coordination: Coordination normal.   Psychiatric:         Speech: Speech normal.         Behavior: Behavior normal.          MDM  Number of Diagnoses or Management Options  Diagnosis management comments:   DDx: Covid pneumonia, respiratory distress, hypoxia, dehydration       Amount and/or Complexity of Data Reviewed  Clinical lab tests: ordered and reviewed  Tests in the radiology section of CPT®: reviewed and ordered  Review and summarize past medical records: yes  Discuss the patient with other providers: yes  Independent visualization of images, tracings, or specimens: yes    Patient Progress  Patient progress: stable         Procedures    I discussed patient's PMH, exam findings as well as careplan with the ER attending who agrees with care plan. Christiano Gregory PA-C    EKG interpretation:   Rhythm: sinus tachycardia; and regular .  Rate (approx.): 115; Axis: normal; P wave: normal; QRS interval: normal ; ST/T wave: normal; Other findings: abnormal ekg. 43-year-old male with history of hypertension, COVID+, febrile and tachycardic here hypoxic at 88-89% during my initial evaluation. Will do septic work-up, portable chest x-ray, EKG, start on oxygen and Decadron, Rocephin and azithromycin IV ordered for presumptive PNA. ROSALIE Eaton BLANCA Mckinnon was evaluated in the Emergency Department on 12/11/2020 for the symptoms described in the history of present illness. He/she was evaluated in the context of the global COVID-19 pandemic, which necessitated consideration that the patient might be at risk for infection with the SARS-CoV-2 virus that causes COVID-19. Institutional protocols and algorithms that pertain to the evaluation of patients at risk for COVID-19 are in a state of rapid change based on information released by regulatory bodies including the CDC and federal and state organizations. These policies and algorithms were followed during the patient's care in the ED. Surrogate Decision Maker (Who do you want to make decisions for you in the event you are not able to?): Extended Emergency Contact Information  Primary Emergency Contact: [address] [place] [address] Dr GRANADOS Phone: [phone]  Mobile Phone: [phone]  Relation: Spouse    Ventilation (Do you want to be intubated and mechanically ventilated?):  Yes    CPR (Do you want chest compressions and electricity in an attempt to restart your heart?): Yes      Perfect Serve Consult for Admission  3:48 PM  ED Room Number: ER06/06  Patient Name and age:  Mazin Mckinnon 38 y.o.  male  Working Diagnosis:   1. Acute respiratory failure with hypoxia ([address]    2.  Pneumonia due to COVID-19 virus        COVID-19 Suspicion:  yes  Sepsis present:  no meets SIRS, Reassessment needed: N/A  Code Status:  Full Code  Readmission: no  Isolation Requirements:  yes  Recommended Level of Care:  telemetry for COVID  [facility] [phone]  Other:  10 days of symptoms, worsened yesterday. Rapid COVID+ yesterday at [facility]. Febrile, tachycardic, Hypoxic here.  O2, Decadron, Rocephin/azithromycin ordered    Total critical care time spent exclusive of procedures:  40 minutes

## 2020-12-11 NOTE — ED TRIAGE NOTES
Patient co positive test for covid yesterday. States was negative last week per patient syptoms have gotten worse. At home sats were 88% 92% in triage. Patient repots last dose of tylenol was 2 hours ago.    patient having difficulty speaking due to SOB in triage

## 2020-12-12 LAB
ALBUMIN SERPL-MCNC: 2.8 G/DL (ref 3.5–5)
ALBUMIN/GLOB SERPL: 0.7 {RATIO} (ref 1.1–2.2)
ALP SERPL-CCNC: 86 U/L (ref 45–117)
ALT SERPL-CCNC: 61 U/L (ref 12–78)
ANION GAP SERPL CALC-SCNC: 3 MMOL/L (ref 5–15)
APTT PPP: 30.6 SEC (ref 22.1–31)
AST SERPL-CCNC: 37 U/L (ref 15–37)
ATRIAL RATE: 115 BPM
BILIRUB DIRECT SERPL-MCNC: 0.1 MG/DL (ref 0–0.2)
BILIRUB SERPL-MCNC: 0.3 MG/DL (ref 0.2–1)
BUN SERPL-MCNC: 14 MG/DL (ref 6–20)
BUN/CREAT SERPL: 15 (ref 12–20)
CALCIUM SERPL-MCNC: 8 MG/DL (ref 8.5–10.1)
CALCULATED P AXIS, ECG09: 32 DEGREES
CALCULATED R AXIS, ECG10: -14 DEGREES
CALCULATED T AXIS, ECG11: 0 DEGREES
CHLORIDE SERPL-SCNC: 109 MMOL/L (ref 97–108)
CO2 SERPL-SCNC: 25 MMOL/L (ref 21–32)
CREAT SERPL-MCNC: 0.94 MG/DL (ref 0.7–1.3)
CRP SERPL HS-MCNC: >9.5 MG/L
D DIMER PPP FEU-MCNC: 0.4 MG/L FEU (ref 0–0.65)
DIAGNOSIS, 93000: NORMAL
ERYTHROCYTE [DISTWIDTH] IN BLOOD BY AUTOMATED COUNT: 12.5 % (ref 11.5–14.5)
FERRITIN SERPL-MCNC: 1229 NG/ML (ref 26–388)
FIBRINOGEN PPP-MCNC: 484 MG/DL (ref 200–475)
GLOBULIN SER CALC-MCNC: 3.9 G/DL (ref 2–4)
GLUCOSE SERPL-MCNC: 143 MG/DL (ref 65–100)
HCT VFR BLD AUTO: 37.2 % (ref 36.6–50.3)
HGB BLD-MCNC: 12.7 G/DL (ref 12.1–17)
INR PPP: 1 (ref 0.9–1.1)
LDH SERPL L TO P-CCNC: 378 U/L (ref 85–241)
MAGNESIUM SERPL-MCNC: 2.3 MG/DL (ref 1.6–2.4)
MCH RBC QN AUTO: 31.5 PG (ref 26–34)
MCHC RBC AUTO-ENTMCNC: 34.1 G/DL (ref 30–36.5)
MCV RBC AUTO: 92.3 FL (ref 80–99)
NRBC # BLD: 0 K/UL (ref 0–0.01)
NRBC BLD-RTO: 0 PER 100 WBC
P-R INTERVAL, ECG05: 130 MS
PLATELET # BLD AUTO: 253 K/UL (ref 150–400)
PMV BLD AUTO: 8.4 FL (ref 8.9–12.9)
POTASSIUM SERPL-SCNC: 4.4 MMOL/L (ref 3.5–5.1)
PROT SERPL-MCNC: 6.7 G/DL (ref 6.4–8.2)
PROTHROMBIN TIME: 10.4 SEC (ref 9–11.1)
Q-T INTERVAL, ECG07: 324 MS
QRS DURATION, ECG06: 84 MS
QTC CALCULATION (BEZET), ECG08: 448 MS
RBC # BLD AUTO: 4.03 M/UL (ref 4.1–5.7)
SODIUM SERPL-SCNC: 137 MMOL/L (ref 136–145)
THERAPEUTIC RANGE,PTTT: NORMAL SECS (ref 58–77)
VENTRICULAR RATE, ECG03: 115 BPM
WBC # BLD AUTO: 4.6 K/UL (ref 4.1–11.1)

## 2020-12-12 PROCEDURE — 36415 COLL VENOUS BLD VENIPUNCTURE: CPT

## 2020-12-12 PROCEDURE — 65660000000 HC RM CCU STEPDOWN

## 2020-12-12 PROCEDURE — 74011000250 HC RX REV CODE- 250: Performed by: INTERNAL MEDICINE

## 2020-12-12 PROCEDURE — 74011250636 HC RX REV CODE- 250/636: Performed by: INTERNAL MEDICINE

## 2020-12-12 PROCEDURE — 83735 ASSAY OF MAGNESIUM: CPT

## 2020-12-12 PROCEDURE — 82728 ASSAY OF FERRITIN: CPT

## 2020-12-12 PROCEDURE — 85027 COMPLETE CBC AUTOMATED: CPT

## 2020-12-12 PROCEDURE — 74011250637 HC RX REV CODE- 250/637: Performed by: FAMILY MEDICINE

## 2020-12-12 PROCEDURE — 94760 N-INVAS EAR/PLS OXIMETRY 1: CPT

## 2020-12-12 PROCEDURE — 85730 THROMBOPLASTIN TIME PARTIAL: CPT

## 2020-12-12 PROCEDURE — 2709999900 HC NON-CHARGEABLE SUPPLY

## 2020-12-12 PROCEDURE — 83615 LACTATE (LD) (LDH) ENZYME: CPT

## 2020-12-12 PROCEDURE — 77030027138 HC INCENT SPIROMETER -A

## 2020-12-12 PROCEDURE — 85610 PROTHROMBIN TIME: CPT

## 2020-12-12 PROCEDURE — 80076 HEPATIC FUNCTION PANEL: CPT

## 2020-12-12 PROCEDURE — 80048 BASIC METABOLIC PNL TOTAL CA: CPT

## 2020-12-12 PROCEDURE — 94640 AIRWAY INHALATION TREATMENT: CPT

## 2020-12-12 PROCEDURE — 74011250637 HC RX REV CODE- 250/637: Performed by: INTERNAL MEDICINE

## 2020-12-12 PROCEDURE — 85379 FIBRIN DEGRADATION QUANT: CPT

## 2020-12-12 PROCEDURE — 86141 C-REACTIVE PROTEIN HS: CPT

## 2020-12-12 PROCEDURE — 85384 FIBRINOGEN ACTIVITY: CPT

## 2020-12-12 PROCEDURE — 74011000258 HC RX REV CODE- 258: Performed by: INTERNAL MEDICINE

## 2020-12-12 PROCEDURE — 77010033678 HC OXYGEN DAILY

## 2020-12-12 RX ORDER — LANOLIN ALCOHOL/MO/W.PET/CERES
3 CREAM (GRAM) TOPICAL
Status: DISCONTINUED | OUTPATIENT
Start: 2020-12-12 | End: 2020-12-13

## 2020-12-12 RX ADMIN — ZINC SULFATE 220 MG (50 MG) CAPSULE 1 CAPSULE: CAPSULE at 08:08

## 2020-12-12 RX ADMIN — DEXAMETHASONE 6 MG: 6 TABLET ORAL at 08:08

## 2020-12-12 RX ADMIN — CEFTRIAXONE 2 G: 2 INJECTION, POWDER, FOR SOLUTION INTRAMUSCULAR; INTRAVENOUS at 19:01

## 2020-12-12 RX ADMIN — ENOXAPARIN SODIUM 40 MG: 40 INJECTION SUBCUTANEOUS at 08:08

## 2020-12-12 RX ADMIN — Medication 3 MG: at 03:38

## 2020-12-12 RX ADMIN — FAMOTIDINE 20 MG: 20 TABLET, FILM COATED ORAL at 08:08

## 2020-12-12 RX ADMIN — Medication 3 MG: at 22:23

## 2020-12-12 RX ADMIN — AZITHROMYCIN MONOHYDRATE 500 MG: 250 TABLET ORAL at 16:52

## 2020-12-12 RX ADMIN — CHOLECALCIFEROL (VITAMIN D3) 10 MCG (400 UNIT) TABLET 1 TABLET: at 08:08

## 2020-12-12 RX ADMIN — REMDESIVIR 100 MG: 100 INJECTION, POWDER, LYOPHILIZED, FOR SOLUTION INTRAVENOUS at 16:53

## 2020-12-12 RX ADMIN — OXYCODONE HYDROCHLORIDE AND ACETAMINOPHEN 500 MG: 500 TABLET ORAL at 08:08

## 2020-12-12 RX ADMIN — IPRATROPIUM BROMIDE AND ALBUTEROL 1 PUFF: 20; 100 SPRAY, METERED RESPIRATORY (INHALATION) at 17:25

## 2020-12-12 RX ADMIN — FAMOTIDINE 20 MG: 20 TABLET, FILM COATED ORAL at 19:01
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
## 2020-12-12 NOTE — PROGRESS NOTES
1630 Report received from V[place]. SBAR, Kardex, O2 needs covered in report. In to assess pt. VSS. Pt resting in bed. Encouraged to lay on stomach as much as possible. Pt verbalized understanding. [id number] Pt called up and said having trouble breathing. In to assess pt. Pt sweating and states having hard time breathing. O2 already at 6 LPM via NC. Elevated head of bed. Educated pt on focused breathing. Respiratory called to pt room. VSS in flowsheet. Pt states his chest hurts when he coughs, but not at other times. Gave 1 puff inhaler. Respiratory present in pt room to stablize. Pt nasal cannula switched and up to 15 LPM. O2 improving. Per RT keep at 15 LPM to keep O2 up above 88%. 18 Called MD to notify them. Orders received to move upgrade level of care. MD calling pulm to consult on medications and treatment. 6500 TRANSFER - OUT REPORT:    Verbal report given to Senait(name) on Ky Mckinnon  being transferred to [facility] ([facility] for change in oxygen needs. Report consisted of patients Situation, Background, Assessment and   Recommendations(SBAR). Information from the following report(s) SBAR, Kardex, Intake/Output and MAR was reviewed with the receiving nurse. Lines:   Peripheral IV 12/11/20 Right Antecubital (Active)   Site Assessment Clean, dry, & intact 12/12/20 1644   Phlebitis Assessment 0 12/12/20 1644   Infiltration Assessment 0 12/12/20 1644   Dressing Status Clean, dry, & intact 12/12/20 1644   Dressing Type Tape 12/12/20 1644   Hub Color/Line Status Pink;Capped 12/12/20 1644   Action Taken Blood drawn 12/11/20 1448       Peripheral IV 12/11/20 Left Antecubital (Active)   Site Assessment Clean, dry, & intact 12/12/20 1644   Phlebitis Assessment 0 12/12/20 1644   Infiltration Assessment 0 12/12/20 1644   Dressing Status Clean, dry, & intact 12/12/20 1644   Dressing Type Tape 12/12/20 1644   Hub Color/Line Status Pink; Infusing;Patent 12/12/20 1644   Action Taken Blood drawn 12/11/20 9330        Opportunity for questions and clarification was provided.       Patient transported with:   Monitor  O2 @ 15 liters  Patient-specific medications from Pharmacy  Registered Nurse  Tech

## 2020-12-12 NOTE — PROGRESS NOTES
1852 TRANSFER - IN REPORT:    Verbal report received from Gladis(name) on [address]  being received from 4th floor(unit) for routine progression of care      Report consisted of patients Situation, Background, Assessment and   Recommendations(SBAR). Information from the following report(s) SBAR, Kardex, Intake/Output, MAR, Recent Results and Cardiac Rhythm NSR was reviewed with the receiving nurse. Opportunity for questions and clarification was provided. Assessment completed upon patients arrival to unit and care assumed.      1910 report given to Chris RN, pt not arrived to floor yet

## 2020-12-12 NOTE — PROGRESS NOTES
Problem: Airway Clearance - Ineffective  Goal: Achieve or maintain patent airway  Outcome: Progressing Towards Goal     Problem: Gas Exchange - Impaired  Goal: Absence of hypoxia  Outcome: Progressing Towards Goal  Goal: Promote optimal lung function  Outcome: Progressing Towards Goal     Problem: Breathing Pattern - Ineffective  Goal: Ability to achieve and maintain a regular respiratory rate  Outcome: Progressing Towards Goal     Problem:  Body Temperature -  Risk of, Imbalanced  Goal: Ability to maintain a body temperature within defined limits  Outcome: Progressing Towards Goal  Goal: Will regain or maintain usual level of consciousness  Outcome: Progressing Towards Goal  Goal: Complications related to the disease process, condition or treatment will be avoided or minimized  Outcome: Progressing Towards Goal     Problem: Isolation Precautions - Risk of Spread of Infection  Goal: Prevent transmission of infectious organism to others  Outcome: Progressing Towards Goal     Problem: Nutrition Deficits  Goal: Optimize nutrtional status  Outcome: Progressing Towards Goal     Problem: Risk for Fluid Volume Deficit  Goal: Maintain normal heart rhythm  Outcome: Progressing Towards Goal  Goal: Maintain absence of muscle cramping  Outcome: Progressing Towards Goal  Goal: Maintain normal serum potassium, sodium, calcium, phosphorus, and pH  Outcome: Progressing Towards Goal     Problem: Loneliness or Risk for Loneliness  Goal: Demonstrate positive use of time alone when socialization is not possible  Outcome: Progressing Towards Goal     Problem: Fatigue  Goal: Verbalize increase energy and improved vitality  Outcome: Progressing Towards Goal     Problem: Patient Education: Go to Patient Education Activity  Goal: Patient/Family Education  Outcome: Progressing Towards Goal

## 2020-12-12 NOTE — PROGRESS NOTES
TRANSFER - IN REPORT:    Verbal report received from Blaise(name) on [address]  being received from ED(unit) for routine progression of care      Report consisted of patients Situation, Background, Assessment and   Recommendations(SBAR). Information from the following report(s) SBAR, ED Summary, Procedure Summary, MAR, Recent Results and Med Rec Status was reviewed with the receiving nurse. Opportunity for questions and clarification was provided. 0006  Assessment completed upon patients arrival to unit and care assumed. Pt given CHG bath dual skin complete vital signs obtained. Pt O 2 sat 88% on 2 L O  2. Pt bumped up to 4 L N.C. after waiting 10 minutes pt still 88- 89% O 2 increased to 6 L pt placed on pulse ox monitoring telemetry box. After approximately 25 minutes at a higher O 2 setting pt able to decrease O 2 demand      0246  Spoke with pt he is requesting a sleep aide called Dr Zee Gregorio received an order for 3 mg Melatonin PO      0338  Explain to the pt that the MD wanted him to be awake when she rounds this morning and ordered Melatonin to assist with sleep. Pt verbalized understanding. Labs drawn without difficulty       0721  Bedside and Verbal shift change report given to Alondra Garcia (oncoming nurse) by Jame Escobar (offgoing nurse). Report included the following information SBAR, Kardex, ED Summary, Procedure Summary, Intake/Output, MAR and Cardiac Rhythm NSR.

## 2020-12-13 ENCOUNTER — APPOINTMENT (OUTPATIENT)
Dept: GENERAL RADIOLOGY | Age: 40
DRG: 871 | End: 2020-12-13
Attending: FAMILY MEDICINE
Payer: COMMERCIAL

## 2020-12-13 LAB
ALBUMIN SERPL-MCNC: 2.8 G/DL (ref 3.5–5)
ALBUMIN/GLOB SERPL: 0.7 {RATIO} (ref 1.1–2.2)
ALP SERPL-CCNC: 81 U/L (ref 45–117)
ALT SERPL-CCNC: 59 U/L (ref 12–78)
ANION GAP SERPL CALC-SCNC: 5 MMOL/L (ref 5–15)
AST SERPL-CCNC: 33 U/L (ref 15–37)
BASOPHILS # BLD: 0 K/UL (ref 0–0.1)
BASOPHILS NFR BLD: 0 % (ref 0–1)
BILIRUB SERPL-MCNC: 0.4 MG/DL (ref 0.2–1)
BUN SERPL-MCNC: 16 MG/DL (ref 6–20)
BUN/CREAT SERPL: 18 (ref 12–20)
CALCIUM SERPL-MCNC: 8.5 MG/DL (ref 8.5–10.1)
CHLORIDE SERPL-SCNC: 110 MMOL/L (ref 97–108)
CO2 SERPL-SCNC: 25 MMOL/L (ref 21–32)
CREAT SERPL-MCNC: 0.87 MG/DL (ref 0.7–1.3)
CRP SERPL HS-MCNC: >9.5 MG/L
D DIMER PPP FEU-MCNC: 0.35 MG/L FEU (ref 0–0.65)
DIFFERENTIAL METHOD BLD: ABNORMAL
EOSINOPHIL # BLD: 0 K/UL (ref 0–0.4)
EOSINOPHIL NFR BLD: 0 % (ref 0–7)
ERYTHROCYTE [DISTWIDTH] IN BLOOD BY AUTOMATED COUNT: 12.9 % (ref 11.5–14.5)
FERRITIN SERPL-MCNC: 1246 NG/ML (ref 26–388)
FIBRINOGEN PPP-MCNC: 450 MG/DL (ref 200–475)
GLOBULIN SER CALC-MCNC: 3.8 G/DL (ref 2–4)
GLUCOSE SERPL-MCNC: 132 MG/DL (ref 65–100)
HCT VFR BLD AUTO: 36.9 % (ref 36.6–50.3)
HGB BLD-MCNC: 12.7 G/DL (ref 12.1–17)
IMM GRANULOCYTES # BLD AUTO: 0.1 K/UL (ref 0–0.04)
IMM GRANULOCYTES NFR BLD AUTO: 1 % (ref 0–0.5)
LDH SERPL L TO P-CCNC: 360 U/L (ref 85–241)
LYMPHOCYTES # BLD: 1.2 K/UL (ref 0.8–3.5)
LYMPHOCYTES NFR BLD: 14 % (ref 12–49)
MCH RBC QN AUTO: 31.5 PG (ref 26–34)
MCHC RBC AUTO-ENTMCNC: 34.4 G/DL (ref 30–36.5)
MCV RBC AUTO: 91.6 FL (ref 80–99)
MONOCYTES # BLD: 0.7 K/UL (ref 0–1)
MONOCYTES NFR BLD: 8 % (ref 5–13)
NEUTS SEG # BLD: 6.8 K/UL (ref 1.8–8)
NEUTS SEG NFR BLD: 77 % (ref 32–75)
NRBC # BLD: 0 K/UL (ref 0–0.01)
NRBC BLD-RTO: 0 PER 100 WBC
PLATELET # BLD AUTO: 319 K/UL (ref 150–400)
PMV BLD AUTO: 8.5 FL (ref 8.9–12.9)
POTASSIUM SERPL-SCNC: 4 MMOL/L (ref 3.5–5.1)
PROT SERPL-MCNC: 6.6 G/DL (ref 6.4–8.2)
RBC # BLD AUTO: 4.03 M/UL (ref 4.1–5.7)
SODIUM SERPL-SCNC: 140 MMOL/L (ref 136–145)
WBC # BLD AUTO: 8.8 K/UL (ref 4.1–11.1)

## 2020-12-13 PROCEDURE — 74011000250 HC RX REV CODE- 250: Performed by: INTERNAL MEDICINE

## 2020-12-13 PROCEDURE — 85379 FIBRIN DEGRADATION QUANT: CPT

## 2020-12-13 PROCEDURE — 74011250637 HC RX REV CODE- 250/637: Performed by: INTERNAL MEDICINE

## 2020-12-13 PROCEDURE — 86141 C-REACTIVE PROTEIN HS: CPT

## 2020-12-13 PROCEDURE — 87040 BLOOD CULTURE FOR BACTERIA: CPT

## 2020-12-13 PROCEDURE — 85025 COMPLETE CBC W/AUTO DIFF WBC: CPT

## 2020-12-13 PROCEDURE — 82728 ASSAY OF FERRITIN: CPT

## 2020-12-13 PROCEDURE — 94640 AIRWAY INHALATION TREATMENT: CPT

## 2020-12-13 PROCEDURE — 65660000000 HC RM CCU STEPDOWN

## 2020-12-13 PROCEDURE — 74011250636 HC RX REV CODE- 250/636: Performed by: INTERNAL MEDICINE

## 2020-12-13 PROCEDURE — 36415 COLL VENOUS BLD VENIPUNCTURE: CPT

## 2020-12-13 PROCEDURE — 74011000258 HC RX REV CODE- 258: Performed by: INTERNAL MEDICINE

## 2020-12-13 PROCEDURE — 71045 X-RAY EXAM CHEST 1 VIEW: CPT

## 2020-12-13 PROCEDURE — 74011000250 HC RX REV CODE- 250: Performed by: FAMILY MEDICINE

## 2020-12-13 PROCEDURE — 85384 FIBRINOGEN ACTIVITY: CPT

## 2020-12-13 PROCEDURE — 80053 COMPREHEN METABOLIC PANEL: CPT

## 2020-12-13 PROCEDURE — 83615 LACTATE (LD) (LDH) ENZYME: CPT

## 2020-12-13 PROCEDURE — 77010033711 HC HIGH FLOW OXYGEN

## 2020-12-13 RX ORDER — BUMETANIDE 0.25 MG/ML
1 INJECTION INTRAMUSCULAR; INTRAVENOUS ONCE
Status: COMPLETED | OUTPATIENT
Start: 2020-12-13 | End: 2020-12-13

## 2020-12-13 RX ORDER — ZOLPIDEM TARTRATE 5 MG/1
5 TABLET ORAL
Status: DISCONTINUED | OUTPATIENT
Start: 2020-12-13 | End: 2020-12-17 | Stop reason: HOSPADM

## 2020-12-13 RX ADMIN — POTASSIUM CHLORIDE, DEXTROSE MONOHYDRATE AND SODIUM CHLORIDE 75 ML/HR: 150; 5; 450 INJECTION, SOLUTION INTRAVENOUS at 08:26

## 2020-12-13 RX ADMIN — DEXAMETHASONE 6 MG: 6 TABLET ORAL at 08:27

## 2020-12-13 RX ADMIN — CEFTRIAXONE 2 G: 2 INJECTION, POWDER, FOR SOLUTION INTRAMUSCULAR; INTRAVENOUS at 17:53

## 2020-12-13 RX ADMIN — ACETAMINOPHEN 650 MG: 325 TABLET ORAL at 08:27

## 2020-12-13 RX ADMIN — OXYCODONE HYDROCHLORIDE AND ACETAMINOPHEN 500 MG: 500 TABLET ORAL at 08:27

## 2020-12-13 RX ADMIN — ENOXAPARIN SODIUM 40 MG: 40 INJECTION SUBCUTANEOUS at 08:26

## 2020-12-13 RX ADMIN — CHOLECALCIFEROL (VITAMIN D3) 10 MCG (400 UNIT) TABLET 1 TABLET: at 10:42

## 2020-12-13 RX ADMIN — AZITHROMYCIN MONOHYDRATE 500 MG: 250 TABLET ORAL at 17:53

## 2020-12-13 RX ADMIN — FAMOTIDINE 20 MG: 20 TABLET, FILM COATED ORAL at 17:53

## 2020-12-13 RX ADMIN — ZINC SULFATE 220 MG (50 MG) CAPSULE 1 CAPSULE: CAPSULE at 08:27

## 2020-12-13 RX ADMIN — REMDESIVIR 100 MG: 100 INJECTION, POWDER, LYOPHILIZED, FOR SOLUTION INTRAVENOUS at 18:33

## 2020-12-13 RX ADMIN — Medication 10 ML: at 08:28

## 2020-12-13 RX ADMIN — BUMETANIDE 1 MG: 0.25 INJECTION, SOLUTION INTRAMUSCULAR; INTRAVENOUS at 12:19

## 2020-12-13 RX ADMIN — IPRATROPIUM BROMIDE AND ALBUTEROL 1 PUFF: 20; 100 SPRAY, METERED RESPIRATORY (INHALATION) at 20:00

## 2020-12-13 RX ADMIN — FAMOTIDINE 20 MG: 20 TABLET, FILM COATED ORAL at 08:27

## 2020-12-13 NOTE — PROGRESS NOTES
Name: Stephon 50: [address]   : 1980 Admit Date: 2020   Phone: [phone]  Room: 324/01   PCP: Syed Henderson  MRN: [id number]   Date: 2020  Code: Full Code          Chart and notes reviewed. Data reviewed. I review the patient's current medications in the medical record at each encounter. I have evaluated and examined the patient. HPI:    5:06 PM       History was obtained from patient. I was asked by Fran Malhotra MD to see Johnathan Figueredo in consultation for a chief complaint of COVID and acute respiratory failure with hypoxia. History of Present Illness:  Mr. Fatimah Horowitz is a 37 yo gentleman with a history of HTN who is admitted with acute respiratory failure with hypoxia and COVID-19. He has been feeling poorly for about 10 days and was diagnosed with COVID. He comes in with progressive shortness of breath, cough, fevers/chills, and general malaise/fatigue. Note to be hypoxic on RA and satting well on 2L NC. Had attended a [employer] dinner and multiple attendees have now been diagnosed with COVID. Labs: 5.3, cr 1.03, crp 6.79, AST 53, ALT 73, d-dimer 0.47, ABG on RA 7.45/    Images:  CXR with patchy bilateral infiltrates    Interval Hx:  Pt with increased oxygen needs over the weekend. Pt states he is feeling about the same. + cough, some MAJOR. Tm 99.4. Currently on 13 lpm.        Past Medical History:   Diagnosis Date    Hypertension     Obese        Past Surgical History:   Procedure Laterality Date    IR [facility] W SI  2019       No family history on file.     Social History     Tobacco Use    Smoking status: Never Smoker    Smokeless tobacco: Never Used   Substance Use Topics    Alcohol use: Not Currently     Frequency: Never       No Known Allergies    Current Facility-Administered Medications   Medication Dose Route Frequency    zolpidem (AMBIEN) tablet 5 mg  5 mg Oral QHS PRN    sodium chloride (NS) flush 5-10 mL  5-10 mL IntraVENous PRN    ascorbic acid (vitamin C) (VITAMIN C) tablet 500 mg  500 mg Oral DAILY    zinc sulfate (ZINCATE) 220 (50) mg capsule 1 Cap  1 Cap Oral DAILY    cholecalciferol (VITAMIN D3) (400 Units /10 mcg) tablet 1 Tab  400 Units Oral DAILY    dexAMETHasone (DECADRON) tablet 6 mg  6 mg Oral DAILY    azithromycin (ZITHROMAX) tablet 500 mg  500 mg Oral DAILY    acetaminophen (TYLENOL) tablet 650 mg  650 mg Oral Q6H PRN    Or    acetaminophen (TYLENOL) suppository 650 mg  650 mg Rectal Q6H PRN    polyethylene glycol (MIRALAX) packet 17 g  17 g Oral DAILY PRN    ondansetron (ZOFRAN ODT) tablet 4 mg  4 mg Oral Q8H PRN    Or    ondansetron (ZOFRAN) injection 4 mg  4 mg IntraVENous Q6H PRN    famotidine (PEPCID) tablet 20 mg  20 mg Oral BID    enoxaparin (LOVENOX) injection 40 mg  40 mg SubCUTAneous DAILY    dextrose 5% - 0.45% NaCl with KCl 20 mEq/L infusion  75 mL/hr IntraVENous CONTINUOUS    ipratropium-albuterol (COMBIVENT RESPIMAT) 20 mcg-100 mcg inhalation spray  1 Puff Inhalation Q4H PRN    cefTRIAXone (ROCEPHIN) 2 g in sterile water (preservative free) 20 mL IV syringe  2 g IntraVENous Q24H    remdesivir 100 mg in 0.9% sodium chloride 250 mL IVPB  100 mg IntraVENous Q24H         REVIEW OF SYSTEMS   12 point ROS negative except as stated in the HPI. Physical Exam:   Visit Vitals  /75 (BP 1 Location: Right arm, BP Patient Position: At rest)   Pulse 76   Temp 98 °F (36.7 °C)   Resp 18   Ht 5' 6\" (1.676 m)   Wt 89.8 kg (198 lb)   SpO2 91%   BMI 31.96 kg/m²       General:  Alert, cooperative, no distress, appears stated age. Head:  Normocephalic, without obvious abnormality, atraumatic. Eyes:  Conjunctivae/corneas clear. Neck: Supple, symmetrical, trachea midline,   Lungs:   Clear to auscultation bilaterally, w decreased bs   Chest wall:  No tenderness or deformity.    Heart:  Regular rate and rhythm, S1, S2 normal, no murmur, click, rub or gallop. Abdomen:   Soft, non-tender. Bowel sounds normal.   Extremities: Extremities normal, atraumatic, no cyanosis or edema. Pulses: 2+ and symmetric all extremities. Skin: Skin color, texture, turgor normal. No rashes or lesions       Neurologic: Grossly nonfocal       Lab Results   Component Value Date/Time    Sodium 140 12/13/2020 06:16 AM    Potassium 4.0 12/13/2020 06:16 AM    Chloride 110 (H) 12/13/2020 06:16 AM    CO2 25 12/13/2020 06:16 AM    BUN 16 12/13/2020 06:16 AM    Creatinine 0.87 12/13/2020 06:16 AM    Glucose 132 (H) 12/13/2020 06:16 AM    Calcium 8.5 12/13/2020 06:16 AM    Magnesium 2.3 12/12/2020 03:54 AM    Phosphorus 2.5 (L) 09/25/2019 01:37 AM       Lab Results   Component Value Date/Time    WBC 8.8 12/13/2020 06:16 AM    HGB 12.7 12/13/2020 06:16 AM    PLATELET 184 60/25/4419 06:16 AM    MCV 91.6 12/13/2020 06:16 AM       Lab Results   Component Value Date/Time    INR 1.0 12/12/2020 03:54 AM    aPTT 30.6 12/12/2020 03:54 AM    Alk. phosphatase 81 12/13/2020 06:16 AM    Protein, total 6.6 12/13/2020 06:16 AM    Albumin 2.8 (L) 12/13/2020 06:16 AM    Globulin 3.8 12/13/2020 06:16 AM       Lab Results   Component Value Date/Time    Ferritin 1,229 (H) 12/12/2020 03:54 AM       Lab Results   Component Value Date/Time    C-Reactive protein 6.79 (H) 12/11/2020 02:34 PM        No results found for: PH, PHI, PCO2, PCO2I, PO2, PO2I, HCO3, HCO3I, FIO2, FIO2I    Lab Results   Component Value Date/Time    Troponin-I, Qt. <0.05 12/11/2020 02:34 PM        Lab Results   Component Value Date/Time    Culture result: (A) 12/11/2020 02:46 PM     GRAM NEGATIVE RODS GROWING IN 1 OF 4 BOTTLES DRAWN (SITE = [facility])    Culture result: (A) 12/11/2020 02:46 PM     PRELIMINARY REPORT OF GRAM NEGATIVE RODS GROWING IN 1 OF 4 BOTTLES DRAWN CALLED TO AND READ BACK BY AB BERNSTEIN AT 2150 ON 12/12/2020.  H[facility]    Culture result: REMAINING BOTTLE(S) HAS/HAVE NO GROWTH SO FAR 12/11/2020 02:46 PM       No results found for: TOXA1, RPR, HBCM, HBSAG, HAAB, HCAB1, HAAT, G6PD, CRYAC, HIVGT, HIVR, HIV1, HIV12, HIVPC, HIVRPI    No results found for: VANCT, CPK    Lab Results   Component Value Date/Time    Color DARK YELLOW 09/25/2019 08:50 AM    Appearance CLEAR 09/25/2019 08:50 AM    pH (UA) 6.5 09/25/2019 08:50 AM    Protein NEGATIVE  09/25/2019 08:50 AM    Glucose NEGATIVE  09/25/2019 08:50 AM    Ketone NEGATIVE  09/25/2019 08:50 AM    Bilirubin NEGATIVE  09/25/2019 08:50 AM    Blood NEGATIVE  09/25/2019 08:50 AM    Urobilinogen 4.0 (H) 09/25/2019 08:50 AM    Nitrites NEGATIVE  09/25/2019 08:50 AM    Leukocyte Esterase TRACE (A) 09/25/2019 08:50 AM    WBC 0-4 09/25/2019 08:50 AM    RBC 0-5 09/25/2019 08:50 AM    Bacteria NEGATIVE  09/25/2019 08:50 AM       IMPRESSION  · COVID-19  · PNA  · Acute respiratory failure with hypoxia:  Increasing oxygen needs    PLAN  · Goal sats 88% or higher, wean O2 as tolerated  · Stop ivf and diurese today   · Decadron  · Vitamin C, zinc  · Add vit D  · Ceftriaxone, azithromycin  · Cont remdesivir  · Lovenox  · Trend d-dimer, inflammatory markers, renal and hepatic function  · Discussed prone positioning in detail w pt and encouraged for several hours a day.   Cont w inc roseline        Blanco Guerra MD

## 2020-12-13 NOTE — PROGRESS NOTES
Shift change    Bedside and Verbal shift change report given to Jearline Merlin, RN(oncoming nurse) by Darci Godinez RN (offgoing nurse). Report included the following information SBAR, Kardex and Recent Results. Shift Summary  1900 Pt. Not on floor when giving report     Shift Change    Bedside and verbal shift change report given to Andrew Lindsay RN (oncoming nurse) by Jearline Merlin, RN (offgoing nurse). Report included the following information SBAR, Kardex and Recent Results.
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
## 2020-12-13 NOTE — PROGRESS NOTES
2020       Attention Mr. Armand Alfonso.  Fax [phone]    Re: Peter Maynard   1980      To whom it may concern:    I am writing this letter to notify you that Mr Peter Maynard is currently hospitalized under my care at [facility] for COVID-19 infection and subsequent pneumonia. Please excuse him from work. If further paperwork is needed, please allow time for him to be discharged from the hospital, and his family physician can fill them out at his hospital follow up appointment. Denver Re, MD        This letter will be faxed at patient's request to his supervisor.

## 2020-12-13 NOTE — PROGRESS NOTES
1058  Bedside and Verbal shift change report given to [facility] (oncoming nurse) by Joe Castellano (offgoing nurse). Report included the following information SBAR, Kardex, Procedure Summary, Intake/Output, MAR, Accordion, Recent Results and Cardiac Rhythm NSR. Initial assessment done. Denies any pain. Decreased the oxygen to 13L midflow. Sating 95-96%. Visit Vitals  /82   Pulse 82   Temp 98.1 °F (36.7 °C)   Resp 25   Ht 5' 6\" (1.676 m)   Wt 89.8 kg (198 lb)   SpO2 93%   BMI 31.96 kg/m²   '  1811  On 11L midflow. 1900  Bedside and Verbal shift change report given to [address] [place] [address] (oncoming nurse) by Josselyn Weber RN (offgoing nurse). Report included the following information SBAR, Kardex, Procedure Summary, Intake/Output, MAR, Accordion and Recent Results.

## 2020-12-13 NOTE — PROGRESS NOTES
Sarah Rodriguez from lab called stating pnt is growing gram negative rods in 1/4 bottles of blood cultures.  Primary RN Karey Epley notified

## 2020-12-13 NOTE — PROGRESS NOTES
Shift Report:    Bedside and Verbal shift change report given to ROBERTH RN  (oncoming nurse) by Juliann Iniguez RN (offgoing nurse). Report included the following information SBAR, Kardex and Recent Results. .. 1015-  Bedside and Verbal shift change report given to Balta Almonte  (oncoming nurse) by Vahe Hopper RN  (offgoing nurse). Report included the following information SBAR, Kardex and Recent Results.

## 2020-12-14 LAB
ALBUMIN SERPL-MCNC: 3.1 G/DL (ref 3.5–5)
ALBUMIN/GLOB SERPL: 0.8 {RATIO} (ref 1.1–2.2)
ALP SERPL-CCNC: 95 U/L (ref 45–117)
ALT SERPL-CCNC: 109 U/L (ref 12–78)
ANION GAP SERPL CALC-SCNC: 7 MMOL/L (ref 5–15)
APPEARANCE UR: CLEAR
AST SERPL-CCNC: 55 U/L (ref 15–37)
BACTERIA URNS QL MICRO: NEGATIVE /HPF
BASOPHILS # BLD: 0 K/UL (ref 0–0.1)
BASOPHILS NFR BLD: 0 % (ref 0–1)
BILIRUB DIRECT SERPL-MCNC: 0.1 MG/DL (ref 0–0.2)
BILIRUB SERPL-MCNC: 0.3 MG/DL (ref 0.2–1)
BILIRUB UR QL: NEGATIVE
BUN SERPL-MCNC: 21 MG/DL (ref 6–20)
BUN/CREAT SERPL: 22 (ref 12–20)
CALCIUM SERPL-MCNC: 8.7 MG/DL (ref 8.5–10.1)
CHLORIDE SERPL-SCNC: 107 MMOL/L (ref 97–108)
CO2 SERPL-SCNC: 26 MMOL/L (ref 21–32)
COLOR UR: NORMAL
COMMENT, HOLDF: NORMAL
CREAT SERPL-MCNC: 0.96 MG/DL (ref 0.7–1.3)
CRP SERPL HS-MCNC: >9.5 MG/L
D DIMER PPP FEU-MCNC: 0.28 MG/L FEU (ref 0–0.65)
DIFFERENTIAL METHOD BLD: ABNORMAL
EOSINOPHIL # BLD: 0 K/UL (ref 0–0.4)
EOSINOPHIL NFR BLD: 0 % (ref 0–7)
EPITH CASTS URNS QL MICRO: NORMAL /LPF
ERYTHROCYTE [DISTWIDTH] IN BLOOD BY AUTOMATED COUNT: 12.8 % (ref 11.5–14.5)
FERRITIN SERPL-MCNC: 1192 NG/ML (ref 26–388)
FIBRINOGEN PPP-MCNC: 469 MG/DL (ref 200–475)
GLOBULIN SER CALC-MCNC: 4.1 G/DL (ref 2–4)
GLUCOSE SERPL-MCNC: 121 MG/DL (ref 65–100)
GLUCOSE UR STRIP.AUTO-MCNC: NEGATIVE MG/DL
HCT VFR BLD AUTO: 39.8 % (ref 36.6–50.3)
HGB BLD-MCNC: 13.6 G/DL (ref 12.1–17)
HGB UR QL STRIP: NEGATIVE
HYALINE CASTS URNS QL MICRO: NORMAL /LPF (ref 0–5)
IMM GRANULOCYTES # BLD AUTO: 0 K/UL
IMM GRANULOCYTES NFR BLD AUTO: 0 %
KETONES UR QL STRIP.AUTO: NEGATIVE MG/DL
LDH SERPL L TO P-CCNC: 370 U/L (ref 85–241)
LEUKOCYTE ESTERASE UR QL STRIP.AUTO: NEGATIVE
LYMPHOCYTES # BLD: 1 K/UL (ref 0.8–3.5)
LYMPHOCYTES NFR BLD: 14 % (ref 12–49)
MCH RBC QN AUTO: 31.6 PG (ref 26–34)
MCHC RBC AUTO-ENTMCNC: 34.2 G/DL (ref 30–36.5)
MCV RBC AUTO: 92.6 FL (ref 80–99)
MONOCYTES # BLD: 0.6 K/UL (ref 0–1)
MONOCYTES NFR BLD: 8 % (ref 5–13)
NEUTS BAND NFR BLD MANUAL: 4 % (ref 0–6)
NEUTS SEG # BLD: 5.7 K/UL (ref 1.8–8)
NEUTS SEG NFR BLD: 74 % (ref 32–75)
NITRITE UR QL STRIP.AUTO: NEGATIVE
NRBC # BLD: 0 K/UL (ref 0–0.01)
NRBC BLD-RTO: 0 PER 100 WBC
PH UR STRIP: 6 [PH] (ref 5–8)
PLATELET # BLD AUTO: 373 K/UL (ref 150–400)
PMV BLD AUTO: 8.5 FL (ref 8.9–12.9)
POTASSIUM SERPL-SCNC: 4.2 MMOL/L (ref 3.5–5.1)
PROT SERPL-MCNC: 7.2 G/DL (ref 6.4–8.2)
PROT UR STRIP-MCNC: NEGATIVE MG/DL
RBC # BLD AUTO: 4.3 M/UL (ref 4.1–5.7)
RBC #/AREA URNS HPF: NORMAL /HPF (ref 0–5)
RBC MORPH BLD: ABNORMAL
SAMPLES BEING HELD,HOLD: NORMAL
SODIUM SERPL-SCNC: 140 MMOL/L (ref 136–145)
SP GR UR REFRACTOMETRY: 1.02 (ref 1–1.03)
UA: UC IF INDICATED,UAUC: NORMAL
UROBILINOGEN UR QL STRIP.AUTO: 0.2 EU/DL (ref 0.2–1)
WBC # BLD AUTO: 7.3 K/UL (ref 4.1–11.1)
WBC URNS QL MICRO: NORMAL /HPF (ref 0–4)

## 2020-12-14 PROCEDURE — 74011250636 HC RX REV CODE- 250/636: Performed by: INTERNAL MEDICINE

## 2020-12-14 PROCEDURE — 77010033711 HC HIGH FLOW OXYGEN

## 2020-12-14 PROCEDURE — 65660000000 HC RM CCU STEPDOWN

## 2020-12-14 PROCEDURE — 74011000250 HC RX REV CODE- 250: Performed by: INTERNAL MEDICINE

## 2020-12-14 PROCEDURE — 85025 COMPLETE CBC W/AUTO DIFF WBC: CPT

## 2020-12-14 PROCEDURE — 80053 COMPREHEN METABOLIC PANEL: CPT

## 2020-12-14 PROCEDURE — 74011250637 HC RX REV CODE- 250/637: Performed by: FAMILY MEDICINE

## 2020-12-14 PROCEDURE — 81001 URINALYSIS AUTO W/SCOPE: CPT

## 2020-12-14 PROCEDURE — 83615 LACTATE (LD) (LDH) ENZYME: CPT

## 2020-12-14 PROCEDURE — 82248 BILIRUBIN DIRECT: CPT

## 2020-12-14 PROCEDURE — 85379 FIBRIN DEGRADATION QUANT: CPT

## 2020-12-14 PROCEDURE — 36415 COLL VENOUS BLD VENIPUNCTURE: CPT

## 2020-12-14 PROCEDURE — 82728 ASSAY OF FERRITIN: CPT

## 2020-12-14 PROCEDURE — 85384 FIBRINOGEN ACTIVITY: CPT

## 2020-12-14 PROCEDURE — 74011250637 HC RX REV CODE- 250/637: Performed by: INTERNAL MEDICINE

## 2020-12-14 PROCEDURE — 77010033678 HC OXYGEN DAILY

## 2020-12-14 PROCEDURE — 86141 C-REACTIVE PROTEIN HS: CPT

## 2020-12-14 PROCEDURE — 74011000258 HC RX REV CODE- 258: Performed by: INTERNAL MEDICINE

## 2020-12-14 RX ADMIN — Medication 10 ML: at 22:00

## 2020-12-14 RX ADMIN — ZOLPIDEM TARTRATE 5 MG: 5 TABLET ORAL at 22:00

## 2020-12-14 RX ADMIN — DEXAMETHASONE 6 MG: 6 TABLET ORAL at 09:41

## 2020-12-14 RX ADMIN — ZINC SULFATE 220 MG (50 MG) CAPSULE 1 CAPSULE: CAPSULE at 09:41

## 2020-12-14 RX ADMIN — FAMOTIDINE 20 MG: 20 TABLET, FILM COATED ORAL at 17:44

## 2020-12-14 RX ADMIN — OXYCODONE HYDROCHLORIDE AND ACETAMINOPHEN 500 MG: 500 TABLET ORAL at 09:41

## 2020-12-14 RX ADMIN — ENOXAPARIN SODIUM 40 MG: 40 INJECTION SUBCUTANEOUS at 09:00

## 2020-12-14 RX ADMIN — AZITHROMYCIN MONOHYDRATE 500 MG: 250 TABLET ORAL at 17:43

## 2020-12-14 RX ADMIN — FAMOTIDINE 20 MG: 20 TABLET, FILM COATED ORAL at 09:41

## 2020-12-14 RX ADMIN — CHOLECALCIFEROL (VITAMIN D3) 10 MCG (400 UNIT) TABLET 1 TABLET: at 09:41

## 2020-12-14 RX ADMIN — REMDESIVIR 100 MG: 100 INJECTION, POWDER, LYOPHILIZED, FOR SOLUTION INTRAVENOUS at 16:55

## 2020-12-14 RX ADMIN — POLYETHYLENE GLYCOL 3350 17 G: 17 POWDER, FOR SOLUTION ORAL at 03:40

## 2020-12-14 RX ADMIN — CEFTRIAXONE 2 G: 2 INJECTION, POWDER, FOR SOLUTION INTRAMUSCULAR; INTRAVENOUS at 17:44
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
## 2020-12-14 NOTE — ACP (ADVANCE CARE PLANNING)
Advance Care Planning     Advance Care Planning Activator (Inpatient)  Conversation Note      Date of ACP Conversation: 12/14/20     Conversation Conducted with:   Patient with Decision Making Capacity    ACP Activator: [id number] Decision Maker:    Current Designated Health Care Decision Maker:       If no Decision Maker listed above or available through scanned documents, then:    If no Authorized Decision Maker has previously been identified, then patient chooses Health Care Decision Maker:  \"Who would you like to name as your primary health care decision-maker? \"    Name: Fina Gutierrez   Relationship: Wife Phone number: [phone]  Anmol Wills this person be reached easily? \" YES  \"Who would you like to name as your back-up decision maker? \"   Name: n/a  Relationship: n/a Phone number: n/a  \"Can this person be reached easily? \" NO      Care Preferences    Ventilation: \"If you were in your present state of health and suddenly became very ill and were unable to breathe on your own, what would your preference be about the use of a ventilator (breathing machine) if it were available to you? \"      If patient would desire the use of a ventilator (breathing machine), answer \"yes\", if not \"no\":n/a; patient's wife is unsure of his ventilation preferences     \"If your health worsens and it becomes clear that your chance of recovery is unlikely, what would your preference be about the use of a ventilator (breathing machine) if it were available to you? \"     Would the patient desire the use of a ventilator (breathing machine)? see above    Resuscitation  \"CPR works best to restart the heart when there is a sudden event, like a heart attack, in someone who is otherwise healthy. Unfortunately, CPR does not typically restart the heart for people who have serious health conditions or who are very sick. \"    \"In the event your heart stopped as a result of an underlying serious health condition, would you want attempts to be made to restart your heart (answer \"yes\" for attempt to resuscitate) or would you prefer a natural death (answer \"no\" for do not attempt to resuscitate)? \" yes      [x] Yes  [] No   Educated Patient / Era Moots regarding differences between Advance Directives and portable DNR orders. Length of ACP Conversation in minutes:      Conversation Outcomes:  [x] ACP discussion completed  [] Existing advance directive reviewed with patient; no changes to patient's previously recorded wishes     [] New Advance Directive completed   [] Portable Do Not Resuscitate prepared for Provider review and signature  [] POLST/POST/MOLST/MOST prepared for Provider review and signature      Follow-up plan:    [] Schedule follow-up conversation to continue planning  [] Referred individual to Provider for additional questions/concerns   [] Advised patient/agent/surrogate to review completed ACP document and update if needed with changes in condition, patient preferences or care setting     [] This note routed to one or more involved healthcare providers        After multiple tries to get in contact with patient, I reviewed this with his wife.    Sandra brito BS

## 2020-12-14 NOTE — PROGRESS NOTES
Shift change    Bedside and Verbal shift change report given to Eleuterio Santana RN(oncoming nurse) by Hal Aschoff, RN (offgoing nurse). Report included the following information SBAR, Kardex and Recent Results. Shift Change    Bedside and verbal shift change report given to Sarah Huddleston RN (oncoming nurse) by Eleuterio Santana RN (offgoing nurse). Report included the following information SBAR, Kardex and Recent Results.

## 2020-12-14 NOTE — PROGRESS NOTES
12/14/2020  Case Management Note    10:51 AM  Attempted again to call into patient's room, no answer    NISA Benton    10:12 AM  Attempted again to call into patient's room, no answer. NISA Silver    9:38 AM  Attempted to call into patient's room for initial evaluation (assisting Trisha Chung CM) with no answer. Will contact nursing to make sure patient is able to answer phone.      NISA Silver

## 2020-12-14 NOTE — PROGRESS NOTES
Bedside shift change report given to [facility] (oncoming nurse) by Carrie Mcneil (offgoing nurse). Report included the following information SBAR, Kardex, ED Summary, Procedure Summary, Intake/Output, MAR, Recent Results and Cardiac Rhythm NSR.     0945 Pt requested a work note to be faxed to Lio Espinoza at [employer]. Fax number [phone], stating he is been hospitalized for Covid 19. Bedside shift change report given to Carrie Mcneil (oncoming nurse) by [facility] (offgoing nurse). Report included the following information SBAR, Kardex, ED Summary, Procedure Summary, Intake/Output, MAR, Recent Results and Cardiac Rhythm NSR.

## 2020-12-14 NOTE — PROGRESS NOTES
Comprehensive Nutrition Assessment    Type and Reason for Visit: Initial, Positive nutrition screen    Nutrition Recommendations/Plan:   1. Continue with Cardiac diet order. Nutrition Assessment:      12/14: 37 yo male admitted for SARS pneumonia. PMhx: HTN. Class I obese per BMI of 32. Weight hx in EMR indicates weight gain PTA. Pt is COVID-19 +. Attempted to call into pt's room with no answer. Unable to obtain nutrition/weght hx at this time. Spoke with RN, states pt ate most all of his breakfast this morning and has a good appetite. Labs: LFT's elevated. Meds: Vit C, zinc, Vit D3, Decadron, Miralax. Malnutrition Assessment:  Unable to assess at this time. Estimated Daily Nutrient Needs:  Energy (kcal): 2268(REE 1745 x AF 1.3); Weight Used for Energy Requirements: Current  Protein (g): 72-90(0.8-1gm/kg/d); Weight Used for Protein Requirements: Current  Fluid (ml/day): 2268; Method Used for Fluid Requirements: 1 ml/kcal      Nutrition Related Findings:  LBM 12/14      Wounds:    None       Current Nutrition Therapies:  DIET CARDIAC Regular    Anthropometric Measures:  · Height:  5' 6\" (167.6 cm)  · Current Body Wt:  89.8 kg (197 lb 15.6 oz)   · Admission Body Wt:       · Usual Body Wt:        · Ideal Body Wt:   :      · Adjusted Body Weight:   ; Weight Adjustment for: No adjustment   · Adjusted BMI:       · BMI Category:  Obese class 1 (BMI 30.0-34. 9)       Nutrition Diagnosis:   · No nutrition diagnosis at this time related to   as evidenced by        Nutrition Interventions:   Food and/or Nutrient Delivery: Continue current diet  Nutrition Education and Counseling: No recommendations at this time  Coordination of Nutrition Care: Continue to monitor while inpatient    Goals:  Consume > 75% all meals within next 4-5 days       Nutrition Monitoring and Evaluation:   Behavioral-Environmental Outcomes:    Food/Nutrient Intake Outcomes: Food and nutrient intake  Physical Signs/Symptoms Outcomes: Biochemical data, GI status, Weight    Discharge Planning:    Continue current diet     Electronically signed by Linette Barfield RD on 12/14/2020     Contact: [phone]

## 2020-12-14 NOTE — PROGRESS NOTES
Name: Stephon 50: [address]   : 1980 Admit Date: 2020   Phone: [phone]  Room: 324/01   PCP: Jan Medeiros  MRN: [id number]   Date: 2020  Code: Full Code          Chart and notes reviewed. Data reviewed. I review the patient's current medications in the medical record at each encounter. I have evaluated and examined the patient. HPI:    5:06 PM       History was obtained from patient. I was asked by Gino Saucedo MD to see Sammy Christianson in consultation for a chief complaint of COVID and acute respiratory failure with hypoxia. History of Present Illness:  Mr. Stanley Hayden is a 39 yo gentleman with a history of HTN who is admitted with acute respiratory failure with hypoxia and COVID-19. He has been feeling poorly for about 10 days and was diagnosed with COVID. He comes in with progressive shortness of breath, cough, fevers/chills, and general malaise/fatigue. Note to be hypoxic on RA and satting well on 2L NC. Had attended a [employer] and multiple attendees have now been diagnosed with COVID. Labs: 5.3, cr 1.03, crp 6.79, AST 53, ALT 73, d-dimer 0.47, ABG on RA 7.45/    Images:  CXR with patchy bilateral infiltrates    Interval Hx:  Afebile  BP stable  Sats 90% on 11L midflow  D-dimer WNL  ALT elevated- 109 today  Ferritin 1246 on     ROS : States that he's feeling better. Standing up at the sink to brush his teeth: no distress. Denies fever or chills. Denies CP, LE pain, or swelling. Past Medical History:   Diagnosis Date    Hypertension     Obese        Past Surgical History:   Procedure Laterality Date    IR [facility]  2019       No family history on file.     Social History     Tobacco Use    Smoking status: Never Smoker    Smokeless tobacco: Never Used   Substance Use Topics    Alcohol use: Not Currently     Frequency: Never       No Known Allergies    Current Facility-Administered Medications   Medication Dose Route Frequency    zolpidem (AMBIEN) tablet 5 mg  5 mg Oral QHS PRN    sodium chloride (NS) flush 5-10 mL  5-10 mL IntraVENous PRN    ascorbic acid (vitamin C) (VITAMIN C) tablet 500 mg  500 mg Oral DAILY    zinc sulfate (ZINCATE) 220 (50) mg capsule 1 Cap  1 Cap Oral DAILY    cholecalciferol (VITAMIN D3) (400 Units /10 mcg) tablet 1 Tab  400 Units Oral DAILY    dexAMETHasone (DECADRON) tablet 6 mg  6 mg Oral DAILY    azithromycin (ZITHROMAX) tablet 500 mg  500 mg Oral DAILY    acetaminophen (TYLENOL) tablet 650 mg  650 mg Oral Q6H PRN    Or    acetaminophen (TYLENOL) suppository 650 mg  650 mg Rectal Q6H PRN    polyethylene glycol (MIRALAX) packet 17 g  17 g Oral DAILY PRN    ondansetron (ZOFRAN ODT) tablet 4 mg  4 mg Oral Q8H PRN    Or    ondansetron (ZOFRAN) injection 4 mg  4 mg IntraVENous Q6H PRN    famotidine (PEPCID) tablet 20 mg  20 mg Oral BID    enoxaparin (LOVENOX) injection 40 mg  40 mg SubCUTAneous DAILY    ipratropium-albuterol (COMBIVENT RESPIMAT) 20 mcg-100 mcg inhalation spray  1 Puff Inhalation Q4H PRN    cefTRIAXone (ROCEPHIN) 2 g in sterile water (preservative free) 20 mL IV syringe  2 g IntraVENous Q24H    remdesivir 100 mg in 0.9% sodium chloride 250 mL IVPB  100 mg IntraVENous Q24H         REVIEW OF SYSTEMS   12 point ROS negative except as stated in the HPI. Physical Exam:   Visit Vitals  /73 (BP 1 Location: Right arm, BP Patient Position: At rest)   Pulse 72   Temp 98.2 °F (36.8 °C)   Resp 14   Ht 5' 6\" (1.676 m)   Wt 89.8 kg (198 lb)   SpO2 90%   BMI 31.96 kg/m²       General:  Alert, cooperative, no distress, appears stated age. Head:  Normocephalic, without obvious abnormality, atraumatic. Eyes:  Conjunctivae/corneas clear. Neck: Supple, symmetrical, trachea midline,   Lungs:   Deferred   Chest wall:  No tenderness or deformity. Heart:  Deferred. RRR.    Abdomen:   Deferred   Extremities: Extremities normal, atraumatic, no cyanosis or edema. Pulses: 2+ and symmetric all extremities. Skin: Skin color, texture, turgor normal. No rashes or lesions       Neurologic: Grossly nonfocal       Lab Results   Component Value Date/Time    Sodium 140 12/14/2020 03:16 AM    Potassium 4.2 12/14/2020 03:16 AM    Chloride 107 12/14/2020 03:16 AM    CO2 26 12/14/2020 03:16 AM    BUN 21 (H) 12/14/2020 03:16 AM    Creatinine 0.96 12/14/2020 03:16 AM    Glucose 121 (H) 12/14/2020 03:16 AM    Calcium 8.7 12/14/2020 03:16 AM    Magnesium 2.3 12/12/2020 03:54 AM    Phosphorus 2.5 (L) 09/25/2019 01:37 AM       Lab Results   Component Value Date/Time    WBC 7.3 12/14/2020 03:16 AM    HGB 13.6 12/14/2020 03:16 AM    PLATELET 591 90/21/4456 03:16 AM    MCV 92.6 12/14/2020 03:16 AM       Lab Results   Component Value Date/Time    INR 1.0 12/12/2020 03:54 AM    aPTT 30.6 12/12/2020 03:54 AM    Alk. phosphatase 95 12/14/2020 03:16 AM    Protein, total 7.2 12/14/2020 03:16 AM    Albumin 3.1 (L) 12/14/2020 03:16 AM    Globulin 4.1 (H) 12/14/2020 03:16 AM       Lab Results   Component Value Date/Time    Ferritin 1,246 (H) 12/13/2020 06:16 AM       Lab Results   Component Value Date/Time    C-Reactive protein 6.79 (H) 12/11/2020 02:34 PM        No results found for: PH, PHI, PCO2, PCO2I, PO2, PO2I, HCO3, HCO3I, FIO2, FIO2I    Lab Results   Component Value Date/Time    Troponin-I, Qt. <0.05 12/11/2020 02:34 PM        Lab Results   Component Value Date/Time    Culture result: NO GROWTH AFTER 20 HOURS 12/13/2020 10:41 AM    Culture result: (A) 12/11/2020 02:46 PM     GRAM NEGATIVE RODS GROWING IN 1 OF 4 BOTTLES DRAWN (SITE = RAC)    Culture result: (A) 12/11/2020 02:46 PM     PRELIMINARY REPORT OF GRAM NEGATIVE RODS GROWING IN 1 OF 4 BOTTLES DRAWN CALLED TO AND READ BACK BY AB BERNSTEIN AT 2150 ON 12/12/2020.  HJR    Culture result: REMAINING BOTTLE(S) HAS/HAVE NO GROWTH SO FAR 12/11/2020 02:46 PM       No results found for: TOXA1, RPR, HBCM, HBSAG, HAAB, HCAB1, HAAT, G6PD, CRYAC, HIVGT, HIVR, HIV1, HIV12, HIVPC, HIVRPI    No results found for: VANCT, CPK    Lab Results   Component Value Date/Time    Color YELLOW/STRAW 12/14/2020 03:20 AM    Appearance CLEAR 12/14/2020 03:20 AM    pH (UA) 6.0 12/14/2020 03:20 AM    Protein Negative 12/14/2020 03:20 AM    Glucose Negative 12/14/2020 03:20 AM    Ketone Negative 12/14/2020 03:20 AM    Bilirubin Negative 12/14/2020 03:20 AM    Blood Negative 12/14/2020 03:20 AM    Urobilinogen 0.2 12/14/2020 03:20 AM    Nitrites Negative 12/14/2020 03:20 AM    Leukocyte Esterase Negative 12/14/2020 03:20 AM    WBC 0-4 12/14/2020 03:20 AM    RBC 0-5 12/14/2020 03:20 AM    Bacteria Negative 12/14/2020 03:20 AM       IMPRESSION  · COVID-19  · PNA  · Acute respiratory failure with hypoxia:  Increasing oxygen needs    PLAN  · Goal sats 88% or higher, wean O2 as tolerated  · Decadron  · Vitamin C, zinc  · Add vit D  · Ceftriaxone, azithromycin  · Cont remdesivir  · Lovenox  · Trend d-dimer, inflammatory markers, renal and hepatic function  · Continue IS use  · OOB as much as able        Nasir Chaidez NP

## 2020-12-14 NOTE — PROGRESS NOTES
12/14/2020  Reason for Admission:   SARS Pneumonia                   RUR Score:  11% low                   Plan for utilizing home health:      Unlikely to need it     PCP: First and Last name:  Jalyn Shin DO   Name of Practice:    Are you a current patient: Yes/No: Yes   Approximate date of last visit: Last week   Can you participate in a virtual visit with your PCP: Yes                    Current Advanced Directive/Advance Care Plan: None on file, ACP discussion completed                         Transition of Care Plan:                    I completed the assessment with the patient's wife over the phone after multiple attempts to reach the patient. Patient lives with his wife and their daughter in a 2 story home with 1-2 steps to enter. Prior to admission he is independent with ADLs and drives, works. HE does not use any DME and has never had any home health. His wife Antonia Navarro is his emergency contact and can be reached at [phone]. He sees Dr. Jalyn Shin as his PCP and had a visit last week when symptoms onset. He usually gets his prescriptions at [facility] in [place] and they are typically covered. No discharge needs identified at this time. Transition of Care Plan  1. Patient continuing treatment, improving  2. No discharge needs at this time  3. Family will transport at discharge  4. Follow up with PCP, specialties as necessary   5. CM will continue to follow    Care Management Interventions  PCP Verified by CM: Yes(Dr. Jalyn Shin)  Mode of Transport at Discharge:  Other (see comment)(family)  Transition of Care Consult (CM Consult): Discharge Planning  Physical Therapy Consult: No  Occupational Therapy Consult: No  Current Support Network: Lives with Spouse  Confirm Follow Up Transport: Family  Discharge Location  Discharge Placement: Home with family assistance    NISA Lee

## 2020-12-15 LAB
ALBUMIN SERPL-MCNC: 3 G/DL (ref 3.5–5)
ALBUMIN/GLOB SERPL: 0.8 {RATIO} (ref 1.1–2.2)
ALP SERPL-CCNC: 92 U/L (ref 45–117)
ALT SERPL-CCNC: 98 U/L (ref 12–78)
ANION GAP SERPL CALC-SCNC: 6 MMOL/L (ref 5–15)
AST SERPL-CCNC: 39 U/L (ref 15–37)
BASOPHILS # BLD: 0 K/UL (ref 0–0.1)
BASOPHILS NFR BLD: 0 % (ref 0–1)
BILIRUB DIRECT SERPL-MCNC: 0.1 MG/DL (ref 0–0.2)
BILIRUB SERPL-MCNC: 0.4 MG/DL (ref 0.2–1)
BUN SERPL-MCNC: 20 MG/DL (ref 6–20)
BUN/CREAT SERPL: 24 (ref 12–20)
CALCIUM SERPL-MCNC: 8.7 MG/DL (ref 8.5–10.1)
CHLORIDE SERPL-SCNC: 106 MMOL/L (ref 97–108)
CO2 SERPL-SCNC: 26 MMOL/L (ref 21–32)
CREAT SERPL-MCNC: 0.84 MG/DL (ref 0.7–1.3)
CRP SERPL HS-MCNC: 8.1 MG/L
D DIMER PPP FEU-MCNC: 0.28 MG/L FEU (ref 0–0.65)
DIFFERENTIAL METHOD BLD: ABNORMAL
EOSINOPHIL # BLD: 0.1 K/UL (ref 0–0.4)
EOSINOPHIL NFR BLD: 1 % (ref 0–7)
ERYTHROCYTE [DISTWIDTH] IN BLOOD BY AUTOMATED COUNT: 12.3 % (ref 11.5–14.5)
FERRITIN SERPL-MCNC: 1106 NG/ML (ref 26–388)
FIBRINOGEN PPP-MCNC: 549 MG/DL (ref 200–475)
GLOBULIN SER CALC-MCNC: 4 G/DL (ref 2–4)
GLUCOSE SERPL-MCNC: 99 MG/DL (ref 65–100)
HCT VFR BLD AUTO: 39.6 % (ref 36.6–50.3)
HGB BLD-MCNC: 13.4 G/DL (ref 12.1–17)
IMM GRANULOCYTES # BLD AUTO: 0 K/UL
IMM GRANULOCYTES NFR BLD AUTO: 0 %
LDH SERPL L TO P-CCNC: 330 U/L (ref 85–241)
LYMPHOCYTES # BLD: 1.6 K/UL (ref 0.8–3.5)
LYMPHOCYTES NFR BLD: 20 % (ref 12–49)
MCH RBC QN AUTO: 31.3 PG (ref 26–34)
MCHC RBC AUTO-ENTMCNC: 33.8 G/DL (ref 30–36.5)
MCV RBC AUTO: 92.5 FL (ref 80–99)
METAMYELOCYTES NFR BLD MANUAL: 1 %
MONOCYTES # BLD: 0.5 K/UL (ref 0–1)
MONOCYTES NFR BLD: 6 % (ref 5–13)
NEUTS BAND NFR BLD MANUAL: 3 % (ref 0–6)
NEUTS SEG # BLD: 5.8 K/UL (ref 1.8–8)
NEUTS SEG NFR BLD: 69 % (ref 32–75)
NRBC # BLD: 0 K/UL (ref 0–0.01)
NRBC BLD-RTO: 0 PER 100 WBC
PLATELET # BLD AUTO: 387 K/UL (ref 150–400)
PMV BLD AUTO: 8.6 FL (ref 8.9–12.9)
POTASSIUM SERPL-SCNC: 3.8 MMOL/L (ref 3.5–5.1)
PROT SERPL-MCNC: 7 G/DL (ref 6.4–8.2)
RBC # BLD AUTO: 4.28 M/UL (ref 4.1–5.7)
RBC MORPH BLD: ABNORMAL
SODIUM SERPL-SCNC: 138 MMOL/L (ref 136–145)
WBC # BLD AUTO: 8 K/UL (ref 4.1–11.1)

## 2020-12-15 PROCEDURE — 85379 FIBRIN DEGRADATION QUANT: CPT

## 2020-12-15 PROCEDURE — 74011000258 HC RX REV CODE- 258: Performed by: INTERNAL MEDICINE

## 2020-12-15 PROCEDURE — 74011250636 HC RX REV CODE- 250/636: Performed by: INTERNAL MEDICINE

## 2020-12-15 PROCEDURE — 83615 LACTATE (LD) (LDH) ENZYME: CPT

## 2020-12-15 PROCEDURE — 74011000250 HC RX REV CODE- 250: Performed by: INTERNAL MEDICINE

## 2020-12-15 PROCEDURE — 82248 BILIRUBIN DIRECT: CPT

## 2020-12-15 PROCEDURE — 80053 COMPREHEN METABOLIC PANEL: CPT

## 2020-12-15 PROCEDURE — 82728 ASSAY OF FERRITIN: CPT

## 2020-12-15 PROCEDURE — 74011000250 HC RX REV CODE- 250: Performed by: NURSE PRACTITIONER

## 2020-12-15 PROCEDURE — 85384 FIBRINOGEN ACTIVITY: CPT

## 2020-12-15 PROCEDURE — 86141 C-REACTIVE PROTEIN HS: CPT

## 2020-12-15 PROCEDURE — 74011250637 HC RX REV CODE- 250/637: Performed by: INTERNAL MEDICINE

## 2020-12-15 PROCEDURE — 74011250636 HC RX REV CODE- 250/636: Performed by: NURSE PRACTITIONER

## 2020-12-15 PROCEDURE — 85025 COMPLETE CBC W/AUTO DIFF WBC: CPT

## 2020-12-15 PROCEDURE — 36415 COLL VENOUS BLD VENIPUNCTURE: CPT

## 2020-12-15 PROCEDURE — 65660000000 HC RM CCU STEPDOWN

## 2020-12-15 RX ADMIN — FAMOTIDINE 20 MG: 20 TABLET, FILM COATED ORAL at 18:11

## 2020-12-15 RX ADMIN — Medication 10 ML: at 18:15

## 2020-12-15 RX ADMIN — ENOXAPARIN SODIUM 40 MG: 40 INJECTION SUBCUTANEOUS at 10:12

## 2020-12-15 RX ADMIN — OXYCODONE HYDROCHLORIDE AND ACETAMINOPHEN 500 MG: 500 TABLET ORAL at 10:12

## 2020-12-15 RX ADMIN — FAMOTIDINE 20 MG: 20 TABLET, FILM COATED ORAL at 10:12

## 2020-12-15 RX ADMIN — REMDESIVIR 100 MG: 100 INJECTION, POWDER, LYOPHILIZED, FOR SOLUTION INTRAVENOUS at 18:19

## 2020-12-15 RX ADMIN — POLYETHYLENE GLYCOL 3350 17 G: 17 POWDER, FOR SOLUTION ORAL at 18:11

## 2020-12-15 RX ADMIN — AZITHROMYCIN MONOHYDRATE 500 MG: 250 TABLET ORAL at 18:11

## 2020-12-15 RX ADMIN — DEXAMETHASONE 6 MG: 6 TABLET ORAL at 10:12

## 2020-12-15 RX ADMIN — CEFTRIAXONE 2 G: 2 INJECTION, POWDER, FOR SOLUTION INTRAMUSCULAR; INTRAVENOUS at 18:10

## 2020-12-15 RX ADMIN — ZINC SULFATE 220 MG (50 MG) CAPSULE 1 CAPSULE: CAPSULE at 10:12

## 2020-12-15 RX ADMIN — CHOLECALCIFEROL (VITAMIN D3) 10 MCG (400 UNIT) TABLET 1 TABLET: at 10:12
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
## 2020-12-15 NOTE — PROGRESS NOTES
Bedside and Verbal shift change report given to Giles Finn RN (oncoming nurse) by Matthew Burleson RN (offgoing nurse). Report included the following information SBAR, Kardex, Intake/Output, MAR, Accordion and Recent Results. Bedside and Verbal shift change report given to AMANDEEP CONNELL (oncoming nurse) by Giles Finn RN (offgoing nurse). Report included the following information SBAR, Kardex, Intake/Output, MAR, Accordion and Recent Results.

## 2020-12-15 NOTE — PROGRESS NOTES
Name: Stephon 50: [address] [place]ER[place]: 1980 Admit Date: 2020   Phone: [phone]  Room: [facility]/01   PCP: Debomaria e Tidwell  MRN: [id number]   Date: 12/15/2020  Code: Full Code          Chart and notes reviewed. Data reviewed. I review the patient's current medications in the medical record at each encounter. I have evaluated and examined the patient. HPI:    5:06 PM       History was obtained from patient. I was asked by Audra Ellison MD to see Ortega Duncan in consultation for a chief complaint of COVID and acute respiratory failure with hypoxia. History of Present Illness:  Mr. Cayetano Marroquin is a 37 yo gentleman with a history of HTN who is admitted with acute respiratory failure with hypoxia and COVID-19. He has been feeling poorly for about 10 days and was diagnosed with COVID. He comes in with progressive shortness of breath, cough, fevers/chills, and general malaise/fatigue. Note to be hypoxic on RA and satting well on 2L NC. Had attended a for[MD]giving dinner and multiple attendees have now been diagnosed with COVID. Labs: 5.3, cr 1.03, crp 6.79, AST 53, ALT 73, d-dimer 0.47, ABG on RA 7.45/    Images:  CXR with patchy bilateral infiltrates    Interval Hx:  Afebile  BP stable  Sats 95% on 6L NC  D-dimer WNL  ALT decreased; 98 (109 yesterady)  AST 39  ; better  Ferritin 1106; better  Blood cultures  with pantoea sepcies in 1/4 bottles; repeat blood culture  negative x 2 days    ROS : States that he's feeling better. Denies SOB, cough, sputum production, fever, chills, nausea, or vomiting. Past Medical History:   Diagnosis Date    Hypertension     Obese        Past Surgical History:   Procedure Laterality Date    IR Joana [facility] W SI  2019       No family history on file.     Social History     Tobacco Use    Smoking status: Never Smoker    Smokeless tobacco: Never Used   Substance Use Topics    Alcohol use: Not Currently     Frequency: Never       No Known Allergies    Current Facility-Administered Medications   Medication Dose Route Frequency    zolpidem (AMBIEN) tablet 5 mg  5 mg Oral QHS PRN    sodium chloride (NS) flush 5-10 mL  5-10 mL IntraVENous PRN    ascorbic acid (vitamin C) (VITAMIN C) tablet 500 mg  500 mg Oral DAILY    zinc sulfate (ZINCATE) 220 (50) mg capsule 1 Cap  1 Cap Oral DAILY    cholecalciferol (VITAMIN D3) (400 Units /10 mcg) tablet 1 Tab  400 Units Oral DAILY    dexAMETHasone (DECADRON) tablet 6 mg  6 mg Oral DAILY    azithromycin (ZITHROMAX) tablet 500 mg  500 mg Oral DAILY    acetaminophen (TYLENOL) tablet 650 mg  650 mg Oral Q6H PRN    Or    acetaminophen (TYLENOL) suppository 650 mg  650 mg Rectal Q6H PRN    polyethylene glycol (MIRALAX) packet 17 g  17 g Oral DAILY PRN    ondansetron (ZOFRAN ODT) tablet 4 mg  4 mg Oral Q8H PRN    Or    ondansetron (ZOFRAN) injection 4 mg  4 mg IntraVENous Q6H PRN    famotidine (PEPCID) tablet 20 mg  20 mg Oral BID    enoxaparin (LOVENOX) injection 40 mg  40 mg SubCUTAneous DAILY    ipratropium-albuterol (COMBIVENT RESPIMAT) 20 mcg-100 mcg inhalation spray  1 Puff Inhalation Q4H PRN    cefTRIAXone (ROCEPHIN) 2 g in sterile water (preservative free) 20 mL IV syringe  2 g IntraVENous Q24H    remdesivir 100 mg in 0.9% sodium chloride 250 mL IVPB  100 mg IntraVENous Q24H         REVIEW OF SYSTEMS   12 point ROS negative except as stated in the HPI. Physical Exam:   Visit Vitals  /77 (BP 1 Location: Right arm, BP Patient Position: At rest)   Pulse 68   Temp 98.5 °F (36.9 °C)   Resp 18   Ht 5' 6\" (1.676 m)   Wt 89.8 kg (198 lb)   SpO2 92%   BMI 31.96 kg/m²       General:  Alert, cooperative, no distress, appears stated age. Head:  Normocephalic, without obvious abnormality, atraumatic. Eyes:  Conjunctivae/corneas clear.     Neck: Supple, symmetrical, trachea midline,   Lungs:   Bibasilar crackles   Chest wall:  No tenderness or deformity. Heart:  RRR, no murmurs   Abdomen:   Soft,nontender, normal BS   Extremities: Extremities normal, atraumatic, no cyanosis or edema. Pulses: 2+ and symmetric all extremities. Skin: Skin color, texture, turgor normal. No rashes or lesions       Neurologic: Grossly nonfocal       Lab Results   Component Value Date/Time    Sodium 138 12/15/2020 03:43 AM    Potassium 3.8 12/15/2020 03:43 AM    Chloride 106 12/15/2020 03:43 AM    CO2 26 12/15/2020 03:43 AM    BUN 20 12/15/2020 03:43 AM    Creatinine 0.84 12/15/2020 03:43 AM    Glucose 99 12/15/2020 03:43 AM    Calcium 8.7 12/15/2020 03:43 AM    Magnesium 2.3 12/12/2020 03:54 AM    Phosphorus 2.5 (L) 09/25/2019 01:37 AM       Lab Results   Component Value Date/Time    WBC 8.0 12/15/2020 03:43 AM    HGB 13.4 12/15/2020 03:43 AM    PLATELET 166 70/36/2184 03:43 AM    MCV 92.5 12/15/2020 03:43 AM       Lab Results   Component Value Date/Time    INR 1.0 12/12/2020 03:54 AM    aPTT 30.6 12/12/2020 03:54 AM    Alk. phosphatase 92 12/15/2020 03:43 AM    Protein, total 7.0 12/15/2020 03:43 AM    Albumin 3.0 (L) 12/15/2020 03:43 AM    Globulin 4.0 12/15/2020 03:43 AM       Lab Results   Component Value Date/Time    Ferritin 1,106 (H) 12/15/2020 03:43 AM       Lab Results   Component Value Date/Time    C-Reactive protein 6.79 (H) 12/11/2020 02:34 PM        No results found for: PH, PHI, PCO2, PCO2I, PO2, PO2I, HCO3, HCO3I, FIO2, FIO2I    Lab Results   Component Value Date/Time    Troponin-I, Qt. <0.05 12/11/2020 02:34 PM        Lab Results   Component Value Date/Time    Culture result: NO GROWTH 2 DAYS 12/13/2020 10:41 AM    Culture result: (A) 12/11/2020 02:46 PM     PANTOEA SPECIES ISOLATED FROM 1 OF 4 BOTTLES DRAWN. .. [facility] SITE    Culture result: (A) 12/11/2020 02:46 PM     PRELIMINARY REPORT OF GRAM NEGATIVE RODS GROWING IN 1 OF 4 BOTTLES DRAWN CALLED TO AND READ BACK BY AB BERNSTEIN AT 2150 ON 12/12/2020.  R    Culture result: REMAINING BOTTLE(S) HAS/HAVE NO GROWTH SO FAR 12/11/2020 02:46 PM       No results found for: TOXA1, RPR, HBCM, HBSAG, HAAB, HCAB1, HAAT, G6PD, CRYAC, HIVGT, HIVR, HIV1, HIV12, HIVPC, HIVRPI    No results found for: VANCT, CPK    Lab Results   Component Value Date/Time    Color YELLOW/STRAW 12/14/2020 03:20 AM    Appearance CLEAR 12/14/2020 03:20 AM    pH (UA) 6.0 12/14/2020 03:20 AM    Protein Negative 12/14/2020 03:20 AM    Glucose Negative 12/14/2020 03:20 AM    Ketone Negative 12/14/2020 03:20 AM    Bilirubin Negative 12/14/2020 03:20 AM    Blood Negative 12/14/2020 03:20 AM    Urobilinogen 0.2 12/14/2020 03:20 AM    Nitrites Negative 12/14/2020 03:20 AM    Leukocyte Esterase Negative 12/14/2020 03:20 AM    WBC 0-4 12/14/2020 03:20 AM    RBC 0-5 12/14/2020 03:20 AM    Bacteria Negative 12/14/2020 03:20 AM       IMPRESSION  · COVID-19  · PNA  · Acute respiratory failure with hypoxia:  Increasing oxygen needs    PLAN  · Goal sats 88% or higher, wean O2 as tolerated. Weaned to 5L this morning.   · Decadron x 10 days  · Vitamin C, zinc  · Continue Vitamin D  · Ceftriaxone, azithromycin x 5 days  · Cont remdesivir  · Lovenox  · Trend d-dimer, inflammatory markers, renal and hepatic function  · Continue IS use  · OOB as much as able        Rodriguez Garcia NP

## 2020-12-15 NOTE — PROGRESS NOTES
Continue to work to wean down to 6L 02. Repeat blood work negative. Working to increase function    Transition of Care  RUR 10%    1- follow for oxygen wean and potential need at home  2- coordinate any home health needs after acute care.   3- work with family regarding transportation

## 2020-12-15 NOTE — PROGRESS NOTES
Shift change    Bedside and Verbal shift change report given to Octaviano Sidhu RN(oncoming nurse) by Lucrecia Emanuel RN (offgoing nurse). Report included the following information SBAR, Kardex and Recent Results. Shift Summary  Disaster Charting was initiated on 12/14/2020 as per Standard Operating Procedure   Shift Change    Bedside and verbal shift change report given to Malick Chow (oncoming nurse) by Octaviano Sidhu RN (offgoing nurse). Report included the following information SBAR, Kardex and Recent Results.

## 2020-12-16 LAB
ALBUMIN SERPL-MCNC: 3 G/DL (ref 3.5–5)
ALBUMIN/GLOB SERPL: 0.7 {RATIO} (ref 1.1–2.2)
ALP SERPL-CCNC: 94 U/L (ref 45–117)
ALT SERPL-CCNC: 106 U/L (ref 12–78)
ANION GAP SERPL CALC-SCNC: 6 MMOL/L (ref 5–15)
AST SERPL-CCNC: 39 U/L (ref 15–37)
BASOPHILS # BLD: 0 K/UL (ref 0–0.1)
BASOPHILS NFR BLD: 0 % (ref 0–1)
BILIRUB DIRECT SERPL-MCNC: 0.1 MG/DL (ref 0–0.2)
BILIRUB SERPL-MCNC: 0.3 MG/DL (ref 0.2–1)
BUN SERPL-MCNC: 21 MG/DL (ref 6–20)
BUN/CREAT SERPL: 24 (ref 12–20)
CALCIUM SERPL-MCNC: 8.8 MG/DL (ref 8.5–10.1)
CHLORIDE SERPL-SCNC: 106 MMOL/L (ref 97–108)
CO2 SERPL-SCNC: 26 MMOL/L (ref 21–32)
CREAT SERPL-MCNC: 0.88 MG/DL (ref 0.7–1.3)
D DIMER PPP FEU-MCNC: 0.25 MG/L FEU (ref 0–0.65)
DIFFERENTIAL METHOD BLD: ABNORMAL
EOSINOPHIL # BLD: 0.1 K/UL (ref 0–0.4)
EOSINOPHIL NFR BLD: 1 % (ref 0–7)
ERYTHROCYTE [DISTWIDTH] IN BLOOD BY AUTOMATED COUNT: 12.4 % (ref 11.5–14.5)
FERRITIN SERPL-MCNC: 1141 NG/ML (ref 26–388)
FIBRINOGEN PPP-MCNC: 470 MG/DL (ref 200–475)
GLOBULIN SER CALC-MCNC: 4.1 G/DL (ref 2–4)
GLUCOSE SERPL-MCNC: 109 MG/DL (ref 65–100)
HCT VFR BLD AUTO: 39.1 % (ref 36.6–50.3)
HGB BLD-MCNC: 13.4 G/DL (ref 12.1–17)
IMM GRANULOCYTES # BLD AUTO: 0 K/UL
IMM GRANULOCYTES NFR BLD AUTO: 0 %
LDH SERPL L TO P-CCNC: 304 U/L (ref 85–241)
LYMPHOCYTES # BLD: 1.4 K/UL (ref 0.8–3.5)
LYMPHOCYTES NFR BLD: 18 % (ref 12–49)
MCH RBC QN AUTO: 31.6 PG (ref 26–34)
MCHC RBC AUTO-ENTMCNC: 34.3 G/DL (ref 30–36.5)
MCV RBC AUTO: 92.2 FL (ref 80–99)
METAMYELOCYTES NFR BLD MANUAL: 2 %
MONOCYTES # BLD: 0.4 K/UL (ref 0–1)
MONOCYTES NFR BLD: 5 % (ref 5–13)
NEUTS BAND NFR BLD MANUAL: 1 % (ref 0–6)
NEUTS SEG # BLD: 5.7 K/UL (ref 1.8–8)
NEUTS SEG NFR BLD: 73 % (ref 32–75)
NRBC # BLD: 0 K/UL (ref 0–0.01)
NRBC BLD-RTO: 0 PER 100 WBC
PLATELET # BLD AUTO: 422 K/UL (ref 150–400)
PMV BLD AUTO: 8.4 FL (ref 8.9–12.9)
POTASSIUM SERPL-SCNC: 4 MMOL/L (ref 3.5–5.1)
PROT SERPL-MCNC: 7.1 G/DL (ref 6.4–8.2)
RBC # BLD AUTO: 4.24 M/UL (ref 4.1–5.7)
RBC MORPH BLD: ABNORMAL
SODIUM SERPL-SCNC: 138 MMOL/L (ref 136–145)
WBC # BLD AUTO: 7.7 K/UL (ref 4.1–11.1)

## 2020-12-16 PROCEDURE — 80053 COMPREHEN METABOLIC PANEL: CPT

## 2020-12-16 PROCEDURE — 94618 PULMONARY STRESS TESTING: CPT

## 2020-12-16 PROCEDURE — 85025 COMPLETE CBC W/AUTO DIFF WBC: CPT

## 2020-12-16 PROCEDURE — 86141 C-REACTIVE PROTEIN HS: CPT

## 2020-12-16 PROCEDURE — 74011250637 HC RX REV CODE- 250/637: Performed by: INTERNAL MEDICINE

## 2020-12-16 PROCEDURE — 74011250636 HC RX REV CODE- 250/636: Performed by: INTERNAL MEDICINE

## 2020-12-16 PROCEDURE — 83615 LACTATE (LD) (LDH) ENZYME: CPT

## 2020-12-16 PROCEDURE — 82728 ASSAY OF FERRITIN: CPT

## 2020-12-16 PROCEDURE — 82248 BILIRUBIN DIRECT: CPT

## 2020-12-16 PROCEDURE — 85384 FIBRINOGEN ACTIVITY: CPT

## 2020-12-16 PROCEDURE — 36415 COLL VENOUS BLD VENIPUNCTURE: CPT

## 2020-12-16 PROCEDURE — 65660000000 HC RM CCU STEPDOWN

## 2020-12-16 PROCEDURE — 85379 FIBRIN DEGRADATION QUANT: CPT

## 2020-12-16 RX ADMIN — ENOXAPARIN SODIUM 40 MG: 40 INJECTION SUBCUTANEOUS at 09:50

## 2020-12-16 RX ADMIN — FAMOTIDINE 20 MG: 20 TABLET, FILM COATED ORAL at 17:10

## 2020-12-16 RX ADMIN — ZINC SULFATE 220 MG (50 MG) CAPSULE 1 CAPSULE: CAPSULE at 09:50

## 2020-12-16 RX ADMIN — DEXAMETHASONE 6 MG: 6 TABLET ORAL at 09:50

## 2020-12-16 RX ADMIN — CHOLECALCIFEROL (VITAMIN D3) 10 MCG (400 UNIT) TABLET 1 TABLET: at 09:50

## 2020-12-16 RX ADMIN — AZITHROMYCIN MONOHYDRATE 500 MG: 250 TABLET ORAL at 17:10

## 2020-12-16 RX ADMIN — FAMOTIDINE 20 MG: 20 TABLET, FILM COATED ORAL at 09:50

## 2020-12-16 RX ADMIN — OXYCODONE HYDROCHLORIDE AND ACETAMINOPHEN 500 MG: 500 TABLET ORAL at 09:50

## 2020-12-16 RX ADMIN — POLYETHYLENE GLYCOL 3350 17 G: 17 POWDER, FOR SOLUTION ORAL at 20:56

## 2020-12-16 NOTE — PROGRESS NOTES
Bedside and Verbal shift change report given to Giles Finn RN (oncoming nurse) by Sophia Mcdaniels RN (offgoing nurse). Report included the following information SBAR, Kardex, Intake/Output, MAR, Accordion and Recent Results. Bedside and Verbal shift change report given to [facility]-MICHELLE TIJERINA RN (oncoming nurse) by Giles Finn RN (offgoing nurse). Report included the following information SBAR, Kardex, Intake/Output, MAR, Accordion and Recent Results.

## 2020-12-16 NOTE — PROGRESS NOTES
Bedside and Verbal shift change report given to AMANDEEP no (oncoming nurse) by AMANDEEP Heath (offgoing nurse). Report included the following information SBAR, Kardex, Intake/Output, MAR, Recent Results and Cardiac Rhythm NSR. Introduced self as primary RN. Initial assessment completed. VSS. Pt denies additional complaints at this time. Plan for the evening discussed with pt and he verbalized understanding. Bed locked and in low position with call bell within reach. Instructed pt to press call marshall when needed. White board updated with this RN's name. Disaster Charting was initiated on (12/15/20) as per Standard Operating Procedure during my shift from (1900 to 0730).

## 2020-12-16 NOTE — PROGRESS NOTES
12/16/20 1220 12/16/20 1222 12/16/20 1224   RT Walking Oximetry   Stage Resting (Room Air) During Walk (Room Air) During Walk (Room Air)   SpO2 93 % 91 % 89 %   HR 71 bpm 78 bpm 87 bpm   Rate of Dyspnea 0 0 0   Symptoms   (None noted)   (NONE)   (NONE)   O2 Device None (Room air) None (Room air) None (Room air)   O2 Flow Rate (L/min) 0 l/min 0 l/min 0 l/min   FIO2 (%) 21 % 21 % 21 %      12/16/20 1226   RT Walking Oximetry   Stage Resting (Room Air)   SpO2 92 %   HR 84 bpm   Rate of Dyspnea 0   Symptoms   (NONE)   O2 Device None (Room air)   O2 Flow Rate (L/min) 0 l/min   FIO2 (%) 21 %     Home oxygen assessment completed. Patient will not need home oxygen for discharge. Saturations remained above 87%.

## 2020-12-16 NOTE — PROGRESS NOTES
Name: Stephon 50: [facility]   : 1980 Admit Date: 2020   Phone: [phone]  Room: [facility]/01   PCP: Andrea Gasca  MRN: [id number]   Date: 2020  Code: Full Code          Chart and notes reviewed. Data reviewed. I review the patient's current medications in the medical record at each encounter. I have evaluated and examined the patient. HPI:    5:06 PM       History was obtained from patient. I was asked by Amos Amin MD to see Lisa Hussein in consultation for a chief complaint of COVID and acute respiratory failure with hypoxia. History of Present Illness:  Mr. Juan Carlos Goncalves is a 39 yo gentleman with a history of HTN who is admitted with acute respiratory failure with hypoxia and COVID-19. He has been feeling poorly for about 10 days and was diagnosed with COVID. He comes in with progressive shortness of breath, cough, fevers/chills, and general malaise/fatigue. Note to be hypoxic on RA and satting well on 2L NC. Had attended a [employer] dinner and multiple attendees have now been diagnosed with COVID. Labs: 5.3, cr 1.03, crp 6.79, AST 53, ALT 73, d-dimer 0.47, ABG on RA 7.45/    Images:  CXR with patchy bilateral infiltrates    Interval Hx:  Afebile  BP stable  Sats 95% on 3L NC; better. Was 88-89% on RA when he took it off to go to the bathroom. D-dimer WNL; .25    AST 39  ;  better  Ferritin 1141; slightly increased  Blood cultures  with pantoea sepcies in 1/4 bottles; repeat blood culture  negative x 3 days    ROS : Reports doing well today . Denies SOB, cough. No fever/chlls. Past Medical History:   Diagnosis Date    Hypertension     Obese        Past Surgical History:   Procedure Laterality Date    RADHA [facility] W SI  2019       No family history on file.     Social History     Tobacco Use    Smoking status: Never Smoker    Smokeless tobacco: Never Used   Substance Use Topics    Alcohol use: Not Currently     Frequency: Never       No Known Allergies    Current Facility-Administered Medications   Medication Dose Route Frequency    zolpidem (AMBIEN) tablet 5 mg  5 mg Oral QHS PRN    sodium chloride (NS) flush 5-10 mL  5-10 mL IntraVENous PRN    ascorbic acid (vitamin C) (VITAMIN C) tablet 500 mg  500 mg Oral DAILY    zinc sulfate (ZINCATE) 220 (50) mg capsule 1 Cap  1 Cap Oral DAILY    cholecalciferol (VITAMIN D3) (400 Units /10 mcg) tablet 1 Tab  400 Units Oral DAILY    dexAMETHasone (DECADRON) tablet 6 mg  6 mg Oral DAILY    azithromycin (ZITHROMAX) tablet 500 mg  500 mg Oral DAILY    acetaminophen (TYLENOL) tablet 650 mg  650 mg Oral Q6H PRN    Or    acetaminophen (TYLENOL) suppository 650 mg  650 mg Rectal Q6H PRN    polyethylene glycol (MIRALAX) packet 17 g  17 g Oral DAILY PRN    ondansetron (ZOFRAN ODT) tablet 4 mg  4 mg Oral Q8H PRN    Or    ondansetron (ZOFRAN) injection 4 mg  4 mg IntraVENous Q6H PRN    famotidine (PEPCID) tablet 20 mg  20 mg Oral BID    enoxaparin (LOVENOX) injection 40 mg  40 mg SubCUTAneous DAILY    ipratropium-albuterol (COMBIVENT RESPIMAT) 20 mcg-100 mcg inhalation spray  1 Puff Inhalation Q4H PRN         REVIEW OF SYSTEMS   12 point ROS negative except as stated in the HPI. Physical Exam:   Visit Vitals  /82 (BP 1 Location: Right arm, BP Patient Position: At rest)   Pulse 81   Temp 98.2 °F (36.8 °C)   Resp 22   Ht 5' 6\" (1.676 m)   Wt 89.8 kg (198 lb)   SpO2 96%   BMI 31.96 kg/m²       General:  Alert, cooperative, no distress, appears stated age. Head:  Normocephalic, without obvious abnormality, atraumatic. Eyes:  Conjunctivae/corneas clear. Neck: Supple, symmetrical, trachea midline,   Lungs:   Bibasilar crackles   Chest wall:  No tenderness or deformity. Heart:  RRR, no murmurs   Abdomen:   Soft,nontender, normal BS   Extremities: Extremities normal, atraumatic, no cyanosis or edema.    Pulses: 2+ and symmetric all extremities. Skin: Skin color, texture, turgor normal. No rashes or lesions       Neurologic: Grossly nonfocal       Lab Results   Component Value Date/Time    Sodium 138 12/16/2020 01:14 AM    Potassium 4.0 12/16/2020 01:14 AM    Chloride 106 12/16/2020 01:14 AM    CO2 26 12/16/2020 01:14 AM    BUN 21 (H) 12/16/2020 01:14 AM    Creatinine 0.88 12/16/2020 01:14 AM    Glucose 109 (H) 12/16/2020 01:14 AM    Calcium 8.8 12/16/2020 01:14 AM    Magnesium 2.3 12/12/2020 03:54 AM    Phosphorus 2.5 (L) 09/25/2019 01:37 AM       Lab Results   Component Value Date/Time    WBC 7.7 12/16/2020 01:14 AM    HGB 13.4 12/16/2020 01:14 AM    PLATELET 146 (H) 24/42/6108 01:14 AM    MCV 92.2 12/16/2020 01:14 AM       Lab Results   Component Value Date/Time    INR 1.0 12/12/2020 03:54 AM    aPTT 30.6 12/12/2020 03:54 AM    Alk. phosphatase 94 12/16/2020 01:14 AM    Protein, total 7.1 12/16/2020 01:14 AM    Albumin 3.0 (L) 12/16/2020 01:14 AM    Globulin 4.1 (H) 12/16/2020 01:14 AM       Lab Results   Component Value Date/Time    Ferritin 1,141 (H) 12/16/2020 01:14 AM       Lab Results   Component Value Date/Time    C-Reactive protein 6.79 (H) 12/11/2020 02:34 PM        No results found for: PH, PHI, PCO2, PCO2I, PO2, PO2I, HCO3, HCO3I, FIO2, FIO2I    Lab Results   Component Value Date/Time    Troponin-I, Qt. <0.05 12/11/2020 02:34 PM        Lab Results   Component Value Date/Time    Culture result: NO GROWTH 3 DAYS 12/13/2020 10:41 AM    Culture result: (A) 12/11/2020 02:46 PM     PANTOEA SPECIES ISOLATED FROM 1 OF 4 BOTTLES DRAWN. .. [facility] SITE    Culture result: (A) 12/11/2020 02:46 PM     PRELIMINARY REPORT OF GRAM NEGATIVE RODS GROWING IN 1 OF 4 BOTTLES DRAWN CALLED TO AND READ BACK BY AB BERNSTEIN AT 2150 ON 12/12/2020.  HJR    Culture result: REMAINING BOTTLE(S) HAS/HAVE NO GROWTH SO FAR 12/11/2020 02:46 PM       No results found for: TOXA1, RPR, HBCM, HBSAG, HAAB, HCAB1, HAAT, G6PD, CRYAC, HIVGT, HIVR, HIV1, HIV12, HIVPC, HIVRPI    No results found for: VANCT, CPK    Lab Results   Component Value Date/Time    Color YELLOW/STRAW 12/14/2020 03:20 AM    Appearance CLEAR 12/14/2020 03:20 AM    pH (UA) 6.0 12/14/2020 03:20 AM    Protein Negative 12/14/2020 03:20 AM    Glucose Negative 12/14/2020 03:20 AM    Ketone Negative 12/14/2020 03:20 AM    Bilirubin Negative 12/14/2020 03:20 AM    Blood Negative 12/14/2020 03:20 AM    Urobilinogen 0.2 12/14/2020 03:20 AM    Nitrites Negative 12/14/2020 03:20 AM    Leukocyte Esterase Negative 12/14/2020 03:20 AM    WBC 0-4 12/14/2020 03:20 AM    RBC 0-5 12/14/2020 03:20 AM    Bacteria Negative 12/14/2020 03:20 AM       IMPRESSION  · COVID-19  · PNA  · Acute respiratory failure with hypoxia:  Increasing oxygen needs    PLAN  · Goal sats 88% or higher, wean O2 as tolerated. Weaned to 3L. Will have RT walk him.   · Decadron x 10 days  · Vitamin C, zinc  · Continue Vitamin D  · Ceftriaxone, azithromycin x 5 days  · Cont remdesivir  · Lovenox  · Trend d-dimer, inflammatory markers, renal and hepatic function  · Continue IS use  · OOB as much as able        Karlee Sullivan NP
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
## 2020-12-17 VITALS
BODY MASS INDEX: 31.82 KG/M2 | RESPIRATION RATE: 20 BRPM | HEIGHT: 66 IN | TEMPERATURE: 98.9 F | DIASTOLIC BLOOD PRESSURE: 84 MMHG | HEART RATE: 71 BPM | OXYGEN SATURATION: 93 % | SYSTOLIC BLOOD PRESSURE: 127 MMHG | WEIGHT: 198 LBS

## 2020-12-17 LAB
ALBUMIN SERPL-MCNC: 3.1 G/DL (ref 3.5–5)
ALBUMIN/GLOB SERPL: 0.7 {RATIO} (ref 1.1–2.2)
ALP SERPL-CCNC: 98 U/L (ref 45–117)
ALT SERPL-CCNC: 100 U/L (ref 12–78)
ANION GAP SERPL CALC-SCNC: 4 MMOL/L (ref 5–15)
AST SERPL-CCNC: 28 U/L (ref 15–37)
BACTERIA SPEC CULT: ABNORMAL
BASOPHILS # BLD: 0 K/UL (ref 0–0.1)
BASOPHILS NFR BLD: 0 % (ref 0–1)
BILIRUB DIRECT SERPL-MCNC: 0.2 MG/DL (ref 0–0.2)
BILIRUB SERPL-MCNC: 0.5 MG/DL (ref 0.2–1)
BUN SERPL-MCNC: 21 MG/DL (ref 6–20)
BUN/CREAT SERPL: 21 (ref 12–20)
CALCIUM SERPL-MCNC: 9.2 MG/DL (ref 8.5–10.1)
CHLORIDE SERPL-SCNC: 107 MMOL/L (ref 97–108)
CO2 SERPL-SCNC: 27 MMOL/L (ref 21–32)
CREAT SERPL-MCNC: 0.99 MG/DL (ref 0.7–1.3)
CRP SERPL HS-MCNC: 4.3 MG/L
D DIMER PPP FEU-MCNC: 0.33 MG/L FEU (ref 0–0.65)
DIFFERENTIAL METHOD BLD: ABNORMAL
EOSINOPHIL # BLD: 0 K/UL (ref 0–0.4)
EOSINOPHIL NFR BLD: 0 % (ref 0–7)
ERYTHROCYTE [DISTWIDTH] IN BLOOD BY AUTOMATED COUNT: 12.2 % (ref 11.5–14.5)
FERRITIN SERPL-MCNC: 1227 NG/ML (ref 26–388)
FIBRINOGEN PPP-MCNC: 450 MG/DL (ref 200–475)
GLOBULIN SER CALC-MCNC: 4.2 G/DL (ref 2–4)
GLUCOSE SERPL-MCNC: 103 MG/DL (ref 65–100)
HCT VFR BLD AUTO: 42.3 % (ref 36.6–50.3)
HGB BLD-MCNC: 14.3 G/DL (ref 12.1–17)
IMM GRANULOCYTES # BLD AUTO: 0 K/UL
IMM GRANULOCYTES NFR BLD AUTO: 0 %
LDH SERPL L TO P-CCNC: 268 U/L (ref 85–241)
LYMPHOCYTES # BLD: 2.2 K/UL (ref 0.8–3.5)
LYMPHOCYTES NFR BLD: 27 % (ref 12–49)
MCH RBC QN AUTO: 30.9 PG (ref 26–34)
MCHC RBC AUTO-ENTMCNC: 33.8 G/DL (ref 30–36.5)
MCV RBC AUTO: 91.4 FL (ref 80–99)
MONOCYTES # BLD: 0.3 K/UL (ref 0–1)
MONOCYTES NFR BLD: 3 % (ref 5–13)
NEUTS SEG # BLD: 5.8 K/UL (ref 1.8–8)
NEUTS SEG NFR BLD: 70 % (ref 32–75)
NRBC # BLD: 0 K/UL (ref 0–0.01)
NRBC BLD-RTO: 0 PER 100 WBC
PLATELET # BLD AUTO: 465 K/UL (ref 150–400)
PMV BLD AUTO: 8.6 FL (ref 8.9–12.9)
POTASSIUM SERPL-SCNC: 4 MMOL/L (ref 3.5–5.1)
PROT SERPL-MCNC: 7.3 G/DL (ref 6.4–8.2)
RBC # BLD AUTO: 4.63 M/UL (ref 4.1–5.7)
RBC MORPH BLD: ABNORMAL
SERVICE CMNT-IMP: ABNORMAL
SODIUM SERPL-SCNC: 138 MMOL/L (ref 136–145)
WBC # BLD AUTO: 8.3 K/UL (ref 4.1–11.1)

## 2020-12-17 PROCEDURE — 74011250636 HC RX REV CODE- 250/636: Performed by: INTERNAL MEDICINE

## 2020-12-17 PROCEDURE — 74011250637 HC RX REV CODE- 250/637: Performed by: INTERNAL MEDICINE

## 2020-12-17 PROCEDURE — 36415 COLL VENOUS BLD VENIPUNCTURE: CPT

## 2020-12-17 PROCEDURE — 90471 IMMUNIZATION ADMIN: CPT

## 2020-12-17 PROCEDURE — 74011250636 HC RX REV CODE- 250/636: Performed by: FAMILY MEDICINE

## 2020-12-17 PROCEDURE — 86141 C-REACTIVE PROTEIN HS: CPT

## 2020-12-17 PROCEDURE — 85384 FIBRINOGEN ACTIVITY: CPT

## 2020-12-17 PROCEDURE — 83615 LACTATE (LD) (LDH) ENZYME: CPT

## 2020-12-17 PROCEDURE — 82728 ASSAY OF FERRITIN: CPT

## 2020-12-17 PROCEDURE — 82248 BILIRUBIN DIRECT: CPT

## 2020-12-17 PROCEDURE — 85025 COMPLETE CBC W/AUTO DIFF WBC: CPT

## 2020-12-17 PROCEDURE — 90686 IIV4 VACC NO PRSV 0.5 ML IM: CPT | Performed by: FAMILY MEDICINE

## 2020-12-17 PROCEDURE — 85379 FIBRIN DEGRADATION QUANT: CPT

## 2020-12-17 PROCEDURE — 80053 COMPREHEN METABOLIC PANEL: CPT

## 2020-12-17 RX ORDER — ZINC SULFATE 50(220)MG
220 CAPSULE ORAL DAILY
Qty: 30 CAP | Refills: 0 | Status: SHIPPED | OUTPATIENT
Start: 2020-12-17

## 2020-12-17 RX ORDER — DEXAMETHASONE 6 MG/1
TABLET ORAL
Qty: 5 TAB | Refills: 0 | Status: SHIPPED | OUTPATIENT
Start: 2020-12-17

## 2020-12-17 RX ORDER — FAMOTIDINE 20 MG/1
20 TABLET, FILM COATED ORAL 2 TIMES DAILY
Qty: 60 TAB | Refills: 0 | Status: SHIPPED | OUTPATIENT
Start: 2020-12-17

## 2020-12-17 RX ORDER — CYANOCOBALAMIN (VITAMIN B-12) 500 MCG
400 TABLET ORAL DAILY
Qty: 30 TAB | Refills: 0 | Status: SHIPPED | OUTPATIENT
Start: 2020-12-17

## 2020-12-17 RX ORDER — ASCORBIC ACID 500 MG
500 TABLET ORAL DAILY
Qty: 30 TAB | Refills: 0 | Status: SHIPPED | OUTPATIENT
Start: 2020-12-17

## 2020-12-17 RX ADMIN — ENOXAPARIN SODIUM 40 MG: 40 INJECTION SUBCUTANEOUS at 10:38

## 2020-12-17 RX ADMIN — DEXAMETHASONE 6 MG: 6 TABLET ORAL at 08:37

## 2020-12-17 RX ADMIN — FAMOTIDINE 20 MG: 20 TABLET, FILM COATED ORAL at 08:37

## 2020-12-17 RX ADMIN — INFLUENZA VIRUS VACCINE 0.5 ML: 15; 15; 15; 15 SUSPENSION INTRAMUSCULAR at 10:38

## 2020-12-17 RX ADMIN — CHOLECALCIFEROL (VITAMIN D3) 10 MCG (400 UNIT) TABLET 1 TABLET: at 08:37

## 2020-12-17 RX ADMIN — OXYCODONE HYDROCHLORIDE AND ACETAMINOPHEN 500 MG: 500 TABLET ORAL at 08:37

## 2020-12-17 RX ADMIN — ZINC SULFATE 220 MG (50 MG) CAPSULE 1 CAPSULE: CAPSULE at 08:37

## 2020-12-17 NOTE — PROGRESS NOTES
Patient did well with 6MW; no oxygen required. Discharge orders placed. Will arrange for outpatient follow-up in 2-3 weeks.
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
## 2020-12-17 NOTE — PROGRESS NOTES
Attempted to schedule hospital follow up PCP appointment. Awaiting call back from practice with appointment information.   Alma Jacques, Care Management Specialist.
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
## 2020-12-17 NOTE — PROGRESS NOTES
0700 Bedside and Verbal shift change report given to Ivana George RN (oncoming nurse) by Rena Babcock RN (offgoing nurse). Report included the following information SBAR and Kardex. [address] discharge instructions, verbalized understanding, no questions, left with belongings and discharge instructions, esigned discharge instructions.

## 2020-12-17 NOTE — PROGRESS NOTES
Attempted to schedule hospital follow up PCP appointment. Office is requesting the patient to call in to schedule appointment. Pending patient discharge. Cornel Daniels Care Management Specialist.      Attempted to schedule hospital follow up appointment with [facility]. Patient's address is not within service range.   Cornel Daniels Care Management Specialist.

## 2020-12-17 NOTE — PROGRESS NOTES
1900: Bedside and Verbal shift change report given to Rubio Herrera (oncoming nurse) by Sravanthi Hernández (offgoing nurse). Report included the following information SBAR, Kardex, Intake/Output, MAR and Recent Results. Disaster Charting was initiated on 12/16-17 as per Standard Operating Procedure during my shift from [id number].

## 2020-12-17 NOTE — DISCHARGE INSTRUCTIONS
Patient Discharge Instructions    Kandy Nicholas / [id number] : 1980    Admitted 2020 Discharged: 2020 7:11 AM     ACUTE DIAGNOSES:  SARS pneumonia [J12.81]    CHRONIC MEDICAL DIAGNOSES:  Problem List as of 2020 Date Reviewed: 2019          Codes Class Noted - Resolved    SARS pneumonia ICD-10-CM: J12.81  ICD-9-CM: 480.3  2020 - Present        Sepsis ([facility] ICD-10-CM: A41.9  ICD-9-CM: 038.9, 995.91  2020 - Present        Fever ICD-10-CM: R50.9  ICD-9-CM: 780.60  2020 - Present        Sinus tachycardia ICD-10-CM: R00.0  ICD-9-CM: 427.89  2020 - Present        Tachypnea ICD-10-CM: R06.82  ICD-9-CM: 786.06  2020 - Present        Acute hypoxemic respiratory failure ([facility] ICD-10-CM: J96.01  ICD-9-CM: 518.81  2020 - Present        Hypertension ICD-10-CM: I10  ICD-9-CM: 401.9  Unknown - Present        Obese ICD-10-CM: E66.9  ICD-9-CM: 278.00  Unknown - Present        Obese ICD-10-CM: E66.9  ICD-9-CM: 278.00  2020 - Present        HTN (hypertension) ICD-10-CM: I10  ICD-9-CM: 401.9  2019 - Present        Hemochromatosis ICD-10-CM: E83.119  ICD-9-CM: 275.03  2019 - Present        RESOLVED: Acute liver failure without hepatic coma ICD-10-CM: K72.00  ICD-9-CM: 570  2019 - 2020        RESOLVED: Perihepatic hematoma ICD-10-CM: Q77.33  ICD-9-CM: 573.8  2019 - 2020        RESOLVED: Hemorrhage following liver biopsy ICD-10-CM: K20.379  ICD-9-CM: 998.11  2019 - 2020              DISCHARGE MEDICATIONS:         · It is important that you take the medication exactly as they are prescribed. · Keep your medication in the bottles provided by the pharmacist and keep a list of the medication names, dosages, and times to be taken in your wallet. · Do not take other medications without consulting your doctor.        DIET:  Regular Diet    ACTIVITY: Activity as tolerated    ADDITIONAL INFORMATION: If you experience any of the following symptoms then please call your primary care physician or return to the emergency room if you cannot get hold of your doctor: Fever, chills, nausea, vomiting, diarrhea, change in mentation, falling, bleeding, shortness of breath. FOLLOW UP CARE:  Dr. Regina Davidson, Audelia Marshall, DO  you are to call and set up an appointment to see them with in 1 week. Follow-up with specialists at directed by them      Information obtained by :  I understand that if any problems occur once I am at home I am to contact my physician. I understand and acknowledge receipt of the instructions indicated above.                                                                                                                                            Physician's or R.N.'s Signature                                                                  Date/Time                                                                                                                                              Patient or Representative Signature                                                          Date/Time

## 2020-12-18 ENCOUNTER — PATIENT OUTREACH (OUTPATIENT)
Dept: CASE MANAGEMENT | Age: 40
End: 2020-12-18

## 2020-12-18 LAB — CRP SERPL HS-MCNC: 6.5 MG/L

## 2020-12-19 LAB
BACTERIA SPEC CULT: NORMAL
SERVICE CMNT-IMP: NORMAL

## 2020-12-19 NOTE — PROGRESS NOTES
12/19/20  Multiple attempts made to reach patient - voice messages left and my chart message sent. Unable to reach patient for hospital f/u. Episode resolved at this time.  AR

## 2021-08-03 PROBLEM — I10 HTN (HYPERTENSION): Status: RESOLVED | Noted: 2019-09-20 | Resolved: 2021-08-03

## 2021-08-03 PROBLEM — E66.9 OBESE: Status: RESOLVED | Noted: 2021-08-03 | Resolved: 2021-08-03

## 2022-03-18 PROBLEM — R00.0 SINUS TACHYCARDIA: Status: ACTIVE | Noted: 2020-12-11

## 2022-03-18 PROBLEM — R50.9 FEVER: Status: ACTIVE | Noted: 2020-12-11

## 2022-03-19 PROBLEM — J96.01 ACUTE HYPOXEMIC RESPIRATORY FAILURE (HCC): Status: ACTIVE | Noted: 2020-12-11

## 2022-03-19 PROBLEM — E66.9 OBESE: Status: ACTIVE | Noted: 2020-12-11

## 2022-03-19 PROBLEM — E83.119 HEMOCHROMATOSIS: Status: ACTIVE | Noted: 2019-09-20

## 2022-03-19 PROBLEM — J12.81 SARS PNEUMONIA: Status: ACTIVE | Noted: 2020-12-11

## 2022-03-19 PROBLEM — R06.82 TACHYPNEA: Status: ACTIVE | Noted: 2020-12-11

## 2022-03-20 PROBLEM — A41.9 SEPSIS (HCC): Status: ACTIVE | Noted: 2020-12-11

## 2023-05-23 RX ORDER — ACETAMINOPHEN 500 MG
500 TABLET ORAL EVERY 6 HOURS PRN
COMMUNITY

## 2023-05-23 RX ORDER — ZINC SULFATE 50(220)MG
220 CAPSULE ORAL DAILY
COMMUNITY
Start: 2020-12-17

## 2023-05-23 RX ORDER — ASCORBIC ACID 500 MG
500 TABLET ORAL DAILY
COMMUNITY
Start: 2020-12-17

## 2023-05-23 RX ORDER — DEXAMETHASONE 6 MG/1
TABLET ORAL
COMMUNITY
Start: 2020-12-17

## 2023-05-23 RX ORDER — OMEGA-3S/DHA/EPA/FISH OIL/D3 300MG-1000
1 CAPSULE ORAL DAILY
COMMUNITY
Start: 2020-12-17

## 2023-05-23 RX ORDER — FAMOTIDINE 20 MG/1
20 TABLET, FILM COATED ORAL 2 TIMES DAILY
COMMUNITY
Start: 2020-12-17